# Patient Record
Sex: FEMALE | Race: WHITE | NOT HISPANIC OR LATINO | Employment: OTHER | URBAN - METROPOLITAN AREA
[De-identification: names, ages, dates, MRNs, and addresses within clinical notes are randomized per-mention and may not be internally consistent; named-entity substitution may affect disease eponyms.]

---

## 2017-01-09 ENCOUNTER — HOSPITAL ENCOUNTER (EMERGENCY)
Facility: CLINIC | Age: 62
Discharge: HOME | End: 2017-01-09
Attending: FAMILY MEDICINE | Admitting: FAMILY MEDICINE
Payer: COMMERCIAL

## 2017-01-09 VITALS
OXYGEN SATURATION: 98 % | DIASTOLIC BLOOD PRESSURE: 83 MMHG | SYSTOLIC BLOOD PRESSURE: 184 MMHG | RESPIRATION RATE: 18 BRPM | TEMPERATURE: 97.9 F

## 2017-01-09 DIAGNOSIS — S61.411A HAND LACERATION, RIGHT, INITIAL ENCOUNTER: ICD-10-CM

## 2017-01-09 PROCEDURE — 99213 OFFICE O/P EST LOW 20 MIN: CPT | Mod: 25 | Performed by: FAMILY MEDICINE

## 2017-01-09 PROCEDURE — 99213 OFFICE O/P EST LOW 20 MIN: CPT

## 2017-01-09 PROCEDURE — 12002 RPR S/N/AX/GEN/TRNK2.6-7.5CM: CPT

## 2017-01-09 PROCEDURE — 12002 RPR S/N/AX/GEN/TRNK2.6-7.5CM: CPT | Performed by: FAMILY MEDICINE

## 2017-01-09 ASSESSMENT — ENCOUNTER SYMPTOMS: CONSTITUTIONAL NEGATIVE: 1

## 2017-01-09 NOTE — ED AVS SNAPSHOT
[facility] Emergency Department    [address] [address]    Phone:  [phone]    Fax:  [phone]                                       Elizabeth Treviño   MRN: [id number]    Department:  [facility] Emergency Department   Date of Visit:  1/9/2017           After Visit Summary Signature Page     I have received my discharge instructions, and my questions have been answered. I have discussed any challenges I see with this plan with the nurse or doctor.    ..........................................................................................................................................  Patient/Patient Representative Signature      ..........................................................................................................................................  Patient Representative Print Name and Relationship to Patient    ..................................................               ................................................  Date                                            Time    ..........................................................................................................................................  Reviewed by Signature/Title    ...................................................              ..............................................  Date                                                            Time

## 2017-01-09 NOTE — ED AVS SNAPSHOT
[facility] Emergency Department    [address] [address]    Phone:  [phone]    Fax:  [phone]                                       Elizabeth Treviño   MRN: [id number]    Department:  [facility] Emergency Department   Date of Visit:  1/9/2017           Patient Information     Date Of Birth          1955        Your diagnoses for this visit were:     Hand laceration, right, initial encounter 4th webspace laceration; 5 cm       You were seen by Osiel Allison MD.      Follow-up Information     Follow up with Mariajose Dinh MD. Schedule an appointment as soon as possible for a visit in 2 weeks.    Specialty:  Internal Medicine    Why:  For suture removal    Contact information:     PHYSICIANS  [address] [place] [address] 741  [facility] [address] [phone]          Discharge Instructions         Extremity Laceration: Sutures, Staples, or Tape  A laceration is a cut through the skin. If it is deep, it may require stitches (sutures) or staples to close so it can heal. Minor cuts may be treated with surgical tape closures.   X-rays may be done if something may have entered the skin through the cut. You may also need a tetanus shot if you are not up to date on this vaccination.  Home care    Follow the health care provider s instructions on how to care for the cut.    Wash your hands with soap and warm water before and after caring for your wound. This is to help prevent infection.    Keep the wound clean and dry. If a bandage was applied and it becomes wet or dirty, replace it. Otherwise, leave it in place for the first 24 hours, then change it once a day or as directed.    If sutures or staples were used, clean the wound daily:    After removing the bandage, wash the area with soap and water. Use a wet cotton swab to loosen and remove any blood or crust that forms.    After cleaning, keep the wound clean and dry. Talk with your doctor before applying any antibiotic  ointment to the wound. Reapply the bandage.    You may remove the bandage to shower as usual after the first 24 hours, but do not soak the area in water (no swimming) until the stitches or staples are removed.    If surgical tape closures were used, keep the area clean and dry. If it becomes wet, blot it dry with a towel.    The doctor may prescribe an antibiotic cream or ointment to prevent infection. Do not stop taking this medication until you have finished the prescribed course or the doctor tells you to stop. The doctor may also prescribe medications for pain. Follow the doctor s instructions for taking these medications.    Avoid activities that may reopen your wound.  Follow-up care  Follow up with your health care provider. Most skin wounds heal within ten days. However, an infection may sometimes occur despite proper treatment. Therefore, check the wound daily for the signs of infection listed below. Stitches and staples should be removed within 7-14 days. If surgical tape closures were used, you may remove them after 10 days if they have not fallen off by then.   When to seek medical advice  Call your health care provider right away if any of these occur:    Wound bleeding not controlled by direct pressure    Signs of infection, including increasing pain in the wound, increasing wound redness or swelling, or pus or bad odor coming from the wound    Fever of 100.4 F (38 C) or higher or as directed by your healthcare provider    Stitches or staples come apart or fall out or surgical tape falls off before 7 days    Wound edges re-open    Wound changes colors    Numbness around the wound     Decreased movement around the injured area    7138-3844 [employer]. [address] [place] [address], [place] [address]. All rights reserved. This information is not intended as a substitute for professional medical care. Always follow your healthcare professional's instructions.      Keep area clean and dry and  protected with dressing the 1st 2-3 days.  You may remove the dressing after the 1st 2-3 days and leave it exposed.  Use a protective hand covering if you're working with liquids or chemicals.  Return for suture removal with her primary care provider or the urgent care in 10-14 days.      24 Hour Appointment Hotline       To make an appointment at any [facility] clinic, call [phone] ([phone]). If you don't have a family doctor or clinic, we will help you find one. [facility] clinics are conveniently located to serve the needs of you and your family.             Review of your medicines      Our records show that you are taking the medicines listed below. If these are incorrect, please call your family doctor or clinic.        Dose / Directions Last dose taken    atorvastatin 10 MG tablet   Commonly known as:  LIPITOR   Dose:  10 mg   Quantity:  90 tablet        Take 1 tablet (10 mg) by mouth daily   Refills:  3        B COMPLEX 1 PO        Take  by mouth.   Refills:  0        CALCIUM + D PO   Dose:  2 tablet        Take 2 tablets by mouth. 500 mg calcium   Refills:  0        FISH OIL   Dose:  2 capsule        2 capsules. Unsure of dose, maybe 500 mg each   Refills:  0        levothyroxine 88 MCG tablet   Commonly known as:  SYNTHROID/LEVOTHROID   Dose:  88 mcg   Quantity:  90 tablet        Take 1 tablet (88 mcg) by mouth daily   Refills:  3        rosuvastatin 5 MG tablet   Commonly known as:  CRESTOR   Dose:  5 mg   Quantity:  90 tablet        Take 1 tablet (5 mg) by mouth daily   Refills:  1        VALTREX 500 MG tablet   Dose:  500 mg   Quantity:  30 tablet   Generic drug:  valACYclovir        Take 1 tablet (500 mg) by mouth 2 times daily   Refills:  3                Orders Needing Specimen Collection     None      Pending Results     No orders found from 1/8/2017 to 1/10/2017.            Pending Culture Results     No orders found from 1/8/2017 to 1/10/2017.             Test Results from your hospital  stay            Thank you for choosing [facility]       Thank you for choosing [facility] for your care. Our goal is always to provide you with excellent care. Hearing back from our patients is one way we can continue to improve our services. Please take a few minutes to complete the written survey that you may receive in the mail after you visit with us. Thank you!        [employer] Information     [employer] gives you secure access to your electronic health record. If you see a primary care provider, you can also send messages to your care team and make appointments. If you have questions, please call your primary care clinic.  If you do not have a primary care provider, please call [phone] and they will assist you.        Care EveryWhere ID     This is your Care EveryWhere ID. This could be used by other organizations to access your [facility] medical records  [id number]        After Visit Summary       This is your record. Keep this with you and show to your community pharmacist(s) and doctor(s) at your next visit.

## 2017-01-10 NOTE — DISCHARGE INSTRUCTIONS
Extremity Laceration: Sutures, Staples, or Tape  A laceration is a cut through the skin. If it is deep, it may require stitches (sutures) or staples to close so it can heal. Minor cuts may be treated with surgical tape closures.   X-rays may be done if something may have entered the skin through the cut. You may also need a tetanus shot if you are not up to date on this vaccination.  Home care    Follow the health care provider s instructions on how to care for the cut.    Wash your hands with soap and warm water before and after caring for your wound. This is to help prevent infection.    Keep the wound clean and dry. If a bandage was applied and it becomes wet or dirty, replace it. Otherwise, leave it in place for the first 24 hours, then change it once a day or as directed.    If sutures or staples were used, clean the wound daily:    After removing the bandage, wash the area with soap and water. Use a wet cotton swab to loosen and remove any blood or crust that forms.    After cleaning, keep the wound clean and dry. Talk with your doctor before applying any antibiotic ointment to the wound. Reapply the bandage.    You may remove the bandage to shower as usual after the first 24 hours, but do not soak the area in water (no swimming) until the stitches or staples are removed.    If surgical tape closures were used, keep the area clean and dry. If it becomes wet, blot it dry with a towel.    The doctor may prescribe an antibiotic cream or ointment to prevent infection. Do not stop taking this medication until you have finished the prescribed course or the doctor tells you to stop. The doctor may also prescribe medications for pain. Follow the doctor s instructions for taking these medications.    Avoid activities that may reopen your wound.  Follow-up care  Follow up with your health care provider. Most skin wounds heal within ten days. However, an infection may sometimes occur despite proper treatment.  Therefore, check the wound daily for the signs of infection listed below. Stitches and staples should be removed within 7-14 days. If surgical tape closures were used, you may remove them after 10 days if they have not fallen off by then.   When to seek medical advice  Call your health care provider right away if any of these occur:    Wound bleeding not controlled by direct pressure    Signs of infection, including increasing pain in the wound, increasing wound redness or swelling, or pus or bad odor coming from the wound    Fever of 100.4 F (38 C) or higher or as directed by your healthcare provider    Stitches or staples come apart or fall out or surgical tape falls off before 7 days    Wound edges re-open    Wound changes colors    Numbness around the wound     Decreased movement around the injured area    1927-2416 [employer]. [address] [place], [place] [address]. All rights reserved. This information is not intended as a substitute for professional medical care. Always follow your healthcare professional's instructions.      Keep area clean and dry and protected with dressing the 1st 2-3 days.  You may remove the dressing after the 1st 2-3 days and leave it exposed.  Use a protective hand covering if you're working with liquids or chemicals.  Return for suture removal with her primary care provider or the urgent care in 10-14 days.

## 2017-06-03 ENCOUNTER — HEALTH MAINTENANCE LETTER (OUTPATIENT)
Age: 62
End: 2017-06-03

## 2017-08-04 ENCOUNTER — OFFICE VISIT (OUTPATIENT)
Dept: INTERNAL MEDICINE | Facility: CLINIC | Age: 62
End: 2017-08-04

## 2017-08-04 VITALS
RESPIRATION RATE: 20 BRPM | BODY MASS INDEX: 36.44 KG/M2 | OXYGEN SATURATION: 96 % | SYSTOLIC BLOOD PRESSURE: 122 MMHG | HEART RATE: 67 BPM | WEIGHT: 243.2 LBS | DIASTOLIC BLOOD PRESSURE: 81 MMHG

## 2017-08-04 DIAGNOSIS — E03.9 ACQUIRED HYPOTHYROIDISM: ICD-10-CM

## 2017-08-04 DIAGNOSIS — R60.9 EDEMA, UNSPECIFIED TYPE: Primary | ICD-10-CM

## 2017-08-04 DIAGNOSIS — Z12.31 VISIT FOR SCREENING MAMMOGRAM: ICD-10-CM

## 2017-08-04 DIAGNOSIS — R60.9 EDEMA, UNSPECIFIED TYPE: ICD-10-CM

## 2017-08-04 DIAGNOSIS — E78.2 MIXED HYPERLIPIDEMIA: ICD-10-CM

## 2017-08-04 DIAGNOSIS — Z12.11 SCREEN FOR COLON CANCER: ICD-10-CM

## 2017-08-04 LAB
ALBUMIN UR-MCNC: NEGATIVE MG/DL
ANION GAP SERPL CALCULATED.3IONS-SCNC: 5 MMOL/L (ref 3–14)
APPEARANCE UR: ABNORMAL
BACTERIA #/AREA URNS HPF: ABNORMAL /HPF
BILIRUB UR QL STRIP: NEGATIVE
BUN SERPL-MCNC: 14 MG/DL (ref 7–30)
CALCIUM SERPL-MCNC: 9 MG/DL (ref 8.5–10.1)
CHLORIDE SERPL-SCNC: 106 MMOL/L (ref 94–109)
CHOLEST SERPL-MCNC: 162 MG/DL
CO2 SERPL-SCNC: 30 MMOL/L (ref 20–32)
COLOR UR AUTO: YELLOW
CREAT SERPL-MCNC: 1 MG/DL (ref 0.52–1.04)
GFR SERPL CREATININE-BSD FRML MDRD: 56 ML/MIN/1.7M2
GLUCOSE SERPL-MCNC: 82 MG/DL (ref 70–99)
GLUCOSE UR STRIP-MCNC: NEGATIVE MG/DL
HDLC SERPL-MCNC: 67 MG/DL
HGB UR QL STRIP: NEGATIVE
KETONES UR STRIP-MCNC: NEGATIVE MG/DL
LDLC SERPL CALC-MCNC: 77 MG/DL
LEUKOCYTE ESTERASE UR QL STRIP: ABNORMAL
MUCOUS THREADS #/AREA URNS LPF: PRESENT /LPF
NITRATE UR QL: NEGATIVE
NONHDLC SERPL-MCNC: 96 MG/DL
PH UR STRIP: 7 PH (ref 5–7)
POTASSIUM SERPL-SCNC: 4.4 MMOL/L (ref 3.4–5.3)
RBC #/AREA URNS AUTO: 1 /HPF (ref 0–2)
SODIUM SERPL-SCNC: 141 MMOL/L (ref 133–144)
SP GR UR STRIP: 1.02 (ref 1–1.03)
SQUAMOUS #/AREA URNS AUTO: 1 /HPF (ref 0–1)
TRIGL SERPL-MCNC: 93 MG/DL
TSH SERPL DL<=0.005 MIU/L-ACNC: 3.36 MU/L (ref 0.4–4)
URN SPEC COLLECT METH UR: ABNORMAL
UROBILINOGEN UR STRIP-MCNC: 0 MG/DL (ref 0–2)
WBC #/AREA URNS AUTO: 1 /HPF (ref 0–2)

## 2017-08-04 RX ORDER — ROSUVASTATIN CALCIUM 5 MG/1
5 TABLET, COATED ORAL DAILY
Qty: 90 TABLET | Refills: 1 | Status: SHIPPED | OUTPATIENT
Start: 2017-08-04 | End: 2018-05-01

## 2017-08-04 RX ORDER — LEVOTHYROXINE SODIUM 88 UG/1
88 TABLET ORAL DAILY
Qty: 90 TABLET | Refills: 3 | Status: SHIPPED | OUTPATIENT
Start: 2017-08-04 | End: 2018-05-01

## 2017-08-04 ASSESSMENT — PAIN SCALES - GENERAL: PAINLEVEL: NO PAIN (0)

## 2017-08-04 NOTE — MR AVS SNAPSHOT
After Visit Summary   8/4/2017    Elizabeth Treviño    MRN: [id number]           Patient Information     Date Of Birth          1955        Visit Information        Provider Department      8/4/2017 7:30 AM Mariajose Dinh MD [facility]        Today's Diagnoses     Edema, unspecified type    -  1    Screen for colon cancer        Visit for screening mammogram        Mixed hyperlipidemia        Acquired hypothyroidism           Follow-ups after your visit        Your next 10 appointments already scheduled     Aug 04, 2017  8:30 AM CDT   LAB with  LAB   [facility] Lab ([facility])    [address] [place] [address] [id number] [phone]           Patient must bring picture ID. Patient should be prepared to give a urine specimen  Please do not eat 10-12 hours before your appointment if you are coming in fasting for labs on lipids, cholesterol, or glucose (sugar). Pregnant women should follow their Care Team instructions. Water with medications is okay. Do not drink coffee or other fluids. If you have concerns about taking  your medications, please ask at office or if scheduling via [employer]), send a message by clicking on Secure Messaging, Message Your Care Team.            Sep 29, 2017  7:45 AM CDT   (Arrive by 7:30 AM)   MA SCREENING DIGITAL BILATERAL with [id number]   [facility] ([facility])    [address] [place] [address] [id number] [phone]           Do not use any powder, lotion or deodorant under your arms or on your breast. If you do, we will ask you to remove it before your exam.  Wear comfortable, two-piece clothing.  If you have any allergies, tell your care team.  Bring any previous mammograms from other facilities or have them mailed to the breast center. Three-dimensional (3D) mammograms are available at [place] locations in [facility], [place], [place],  "[facility], and [place]. [facility] locations include [place] and [facility] in [place]. Benefits of 3D mammograms include: - Improved rate of cancer detection - Decreases your chance of having to go back for more tests, which means fewer: - \"False-positive\" results (This means that there is an abnormal area but it isn't cancer.) - Invasive testing procedures, such as a biopsy or surgery - Can provide clearer images of the breast if you have dense breast tissue. 3D mammography is an optional exam that anyone can have with a 2D mammogram. It doesn't replace or take the place of a 2D mammogram. 2D mammograms remain an effective screening test for all women.  Not all insurance companies cover the cost of a 3D mammogram. Check with your insurance.              Future tests that were ordered for you today     Open Future Orders        Priority Expected Expires Ordered    MA SCREENING DIGITAL BILAT - Future  (s+30) Routine  8/4/2018 8/4/2017    Fecal colorectal cancer screen FIT - Future (S+30) Routine 8/25/2017 9/3/2017 8/4/2017    TSH with free T4 reflex Routine 8/4/2017 8/18/2017 8/4/2017    UA with Micro reflex to Culture Routine 8/4/2017 8/4/2018 8/4/2017            Who to contact     Please call your clinic at [phone] to:    Ask questions about your health    Make or cancel appointments    Discuss your medicines    Learn about your test results    Speak to your doctor   If you have compliments or concerns about an experience at your clinic, or if you wish to file a complaint, please contact [facility] Patient Relations at [phone] or email us at [email or web address]         Additional Information About Your Visit        [employer] Information     [employer] gives you secure access to your electronic health record. If you see a primary care provider, you can also send messages to your care team and make appointments. If you have questions, please " call your primary care clinic.  If you do not have a primary care provider, please call [phone] and they will assist you.      [employer] is an electronic gateway that provides easy, online access to your medical records. With [employer], you can request a clinic appointment, read your test results, renew a prescription or communicate with your care team.     To access your existing account, please contact your [facility] or call [phone] for assistance.        Care EveryWhere ID     This is your Care EveryWhere ID. This could be used by other organizations to access your [facility] medical records  [id number]        Your Vitals Were     Pulse Respirations Pulse Oximetry BMI (Body Mass Index)          67 20 96% 36.44 kg/m2         Blood Pressure from Last 3 Encounters:   08/04/17 122/81   01/09/17 184/83   08/22/16 111/68    Weight from Last 3 Encounters:   08/04/17 110.3 kg (243 lb 3.2 oz)   08/22/16 108.4 kg (239 lb)   06/10/16 108.8 kg (239 lb 12.8 oz)              We Performed the Following     Basic metabolic panel     Lipid Profile          Today's Medication Changes          These changes are accurate as of: 8/4/17  8:29 AM.  If you have any questions, ask your nurse or doctor.               These medicines have changed or have updated prescriptions.        Dose/Directions    VALTREX 500 MG tablet   This may have changed:    - when to take this  - reasons to take this   Used for:  Recurrent cold sores   Generic drug:  valACYclovir        Dose:  500 mg   Take 1 tablet (500 mg) by mouth 2 times daily   Quantity:  30 tablet   Refills:  3            Where to get your medicines      These medications were sent to [facility] [id number] [place], [place] [address] [facility] AT [facility] OF [address] [place] [address]  [address] [facility] [address]     Phone:  [phone]     levothyroxine 88 MCG tablet    rosuvastatin 5 MG tablet                Primary Care Provider Office  Phone # Fax #    Mariajose Dinh [phone]        PHYSICIANS [address] [facility] 741  [facility] [address]        Equal Access to Services     GENANIKKI VIKI : Saurav quin ibrahim corie Mcdonald, waaxda luqadaha, qaybta kaalmada adedeisy, sailaja encinas laJasondoris abarca. So [facility] [phone].    ATENCIÓN: Si habla español, tiene a tomas disposición servicios gratuitos de asistencia lingüística. Llame al [phone].    We comply with applicable federal civil rights laws and [place] laws. We do not discriminate on the basis of race, color, national origin, age, disability sex, sexual orientation or gender identity.            Thank you!     Thank you for choosing [facility]  for your care. Our goal is always to provide you with excellent care. Hearing back from our patients is one way we can continue to improve our services. Please take a few minutes to complete the written survey that you may receive in the mail after your visit with us. Thank you!             Your Updated Medication List - Protect others around you: Learn how to safely use, store and throw away your medicines at www.disposemymeds.org.          This list is accurate as of: 8/4/17  8:29 AM.  Always use your most recent med list.                   Brand Name Dispense Instructions for use Diagnosis    B COMPLEX 1 PO      Take  by mouth.        CALCIUM + D PO      Take 2 tablets by mouth. 500 mg calcium        FISH OIL      2 capsules. Unsure of dose, maybe 500 mg each        levothyroxine 88 MCG tablet    SYNTHROID/LEVOTHROID    90 tablet    Take 1 tablet (88 mcg) by mouth daily    Acquired hypothyroidism       rosuvastatin 5 MG tablet    CRESTOR    90 tablet    Take 1 tablet (5 mg) by mouth daily    Mixed hyperlipidemia       VALTREX 500 MG tablet   Generic drug:  valACYclovir     30 tablet    Take 1 tablet (500 mg) by mouth 2 times daily    Recurrent cold sores

## 2017-08-04 NOTE — NURSING NOTE
"Chief Complaint   Patient presents with     Edema     edema in both feet,especially right foot-\" started about a week ago after a trip to [facility]\"     Numbness      2nd and 3rd toes, numbness and tingling (on and off) for about \" 3 weeks\"     Refill Request     needs crestor/synthroid renewed     Headache     headache on right side of head \" this summer, on/off\"     Pain     right lower abdominal tenderness   Joanne Thomas LPN 7:34 AM on 8/4/2017  "

## 2017-08-04 NOTE — PROGRESS NOTES
HPI:   Elizabeth Treviño is a 62 year old year old female presents today for followup. A week ago noticed some swelling in her ankles, now better. Had flown on a long trip recently and had been sitting for a long time. Notes that she can see sock marks on her ankles at end of day. Has continued to gain weight. Very frustrated. No chest pain or SOB, no MANSFIELD, PND. Notes some tingling like pins in her 2nd and 3rd toe righ tside at tips sometimes; worse with some shoes, better with others.       ASSESSMENT/PLAN:   1. Periperal edema, likely related to venous insufficiency. Will check BMP, u/a, TSH to r/o renal causes, hypothyroidism, proteinuria. Nothing to suggest right sided heart failure. Discussed weight loss, physical activity, compression stockings if worsened.   2. Hyperlipidemia on rosuvastatin - lipids today  3. Hypothyroidism - TSH today.  4. HCM - mammogram, FIT testing at her request (colonoscopy due in 2018)      Patient Active Problem List   Diagnosis     Acquired hypothyroidism     Easy bruising     Osteoporosis     PSVT (paroxysmal supraventricular tachycardia) (H)     Abnormal weight gain     Pigmented skin lesion     SK (seborrheic keratosis)     Seborrheic dermatitis       Current Outpatient Prescriptions   Medication Sig Dispense Refill     rosuvastatin (CRESTOR) 5 MG tablet Take 1 tablet (5 mg) by mouth daily 90 tablet 1     levothyroxine (SYNTHROID, LEVOTHROID) 88 MCG tablet Take 1 tablet (88 mcg) by mouth daily 90 tablet 3     VALTREX 500 MG tablet Take 1 tablet (500 mg) by mouth 2 times daily (Patient taking differently: Take 500 mg by mouth as needed ) 30 tablet 3     Calcium Carbonate-Vitamin D (CALCIUM + D PO) Take 2 tablets by mouth. 500 mg calcium         FISH OIL 2 capsules. Unsure of dose, maybe 500 mg each       B Complex Vitamins (B COMPLEX 1 PO) Take  by mouth.             ROS:    Constitutional: no fevers, chill   Cardiovascular: no chest pain, palpitations  Respiratory: no dyspnea, cough,  shortness of breath or wheezing   GI: no nausea, vomiting, diarrhea, no abdominal pain   Musculoskeletal: see HPI. No joint pain      PHYSICAL EXAM:   /81 (BP Location: Right arm, Patient Position: Chair, Cuff Size: Adult Large)  Pulse 67  Resp 20  Wt 110.3 kg (243 lb 3.2 oz)  SpO2 96%  BMI 36.44 kg/m2   Wt Readings from Last 1 Encounters:   08/04/17 110.3 kg (243 lb 3.2 oz)       Constitutional: no distress, comfortable, pleasant    Breasts: symmetric breasts, no nipple discharge, no masses palpable.  Cardiovascular: regular rate and rhythm, normal S1 and S2, no murmurs, rubs or gallops  Respiratory: clear to auscultation, no wheezes or crackles, normal breath sounds   Musculoskeletal:  Trace edema today at ankles      Mariajose Dinh MD

## 2017-09-01 ENCOUNTER — MYC MEDICAL ADVICE (OUTPATIENT)
Dept: INTERNAL MEDICINE | Facility: CLINIC | Age: 62
End: 2017-09-01

## 2017-11-28 ASSESSMENT — ENCOUNTER SYMPTOMS
SORE THROAT: 0
SINUS PAIN: 0
NECK MASS: 0
TROUBLE SWALLOWING: 0
SMELL DISTURBANCE: 0
HOARSE VOICE: 1
TASTE DISTURBANCE: 0
SINUS CONGESTION: 1

## 2017-11-29 ENCOUNTER — OFFICE VISIT (OUTPATIENT)
Dept: FAMILY MEDICINE | Facility: CLINIC | Age: 62
End: 2017-11-29

## 2017-11-29 VITALS
BODY MASS INDEX: 36.14 KG/M2 | OXYGEN SATURATION: 98 % | WEIGHT: 241.2 LBS | HEART RATE: 76 BPM | RESPIRATION RATE: 18 BRPM | DIASTOLIC BLOOD PRESSURE: 78 MMHG | SYSTOLIC BLOOD PRESSURE: 118 MMHG

## 2017-11-29 DIAGNOSIS — E66.812 CLASS 2 OBESITY DUE TO EXCESS CALORIES WITHOUT SERIOUS COMORBIDITY WITH BODY MASS INDEX (BMI) OF 36.0 TO 36.9 IN ADULT: ICD-10-CM

## 2017-11-29 DIAGNOSIS — E03.9 ACQUIRED HYPOTHYROIDISM: ICD-10-CM

## 2017-11-29 DIAGNOSIS — Z23 NEED FOR PROPHYLACTIC VACCINATION AND INOCULATION AGAINST INFLUENZA: ICD-10-CM

## 2017-11-29 DIAGNOSIS — M85.89 OSTEOPENIA OF MULTIPLE SITES: ICD-10-CM

## 2017-11-29 DIAGNOSIS — Z76.89 ENCOUNTER TO ESTABLISH CARE WITH NEW DOCTOR: Primary | ICD-10-CM

## 2017-11-29 DIAGNOSIS — E66.09 CLASS 2 OBESITY DUE TO EXCESS CALORIES WITHOUT SERIOUS COMORBIDITY WITH BODY MASS INDEX (BMI) OF 36.0 TO 36.9 IN ADULT: ICD-10-CM

## 2017-11-29 NOTE — MR AVS SNAPSHOT
After Visit Summary   11/29/2017    Elizabeth Treviño    MRN: [id number]           Patient Information     Date Of Birth          1955        Visit Information        Provider Department      11/29/2017 1:00 PM Coleen Smith MD PhD [facility]        Today's Diagnoses     Encounter to establish care with new doctor    -  1    Need for prophylactic vaccination and inoculation against influenza        Osteopenia of multiple sites        Acquired hypothyroidism        Class 2 obesity due to excess calories without serious comorbidity with body mass index (BMI) of 36.0 to 36.9 in adult          Care Instructions    [facility]: [phone]     [facility] Medication Refill Request Information:  * Please contact your pharmacy regarding ANY request for medication refills.  ** [facility] Prescription Fax = [phone]  * Please allow 3 business days for routine medication refills.  * Please allow 5 business days for controlled substance medication refills.     [facility] Test Result notification information:  *You will be notified with in 7-10 days of your appointment day regarding the results of your test.  If you are on MyChart you will be notified as soon as the provider has reviewed the results and signed off on them.    DEXA Screening Tool    Dexa Scan  Is the patient taking any calcium supplements? yes, if yes patient must stop calcium supplements 24 hours prior to appointment.        Get a DEXA and then see me and bring your calcium and vit D pills  Patient should take 1200mg of calcium/day in divided doses  Diet and all supplements together = 1200-1500mg/day and vitamin D3 1000IU/day.  1 dairy serving = 300mg  You only absorb 5-600mg of calcium at one time  Dietary calcium is the best  Cut meal portions down - no snacks - cut out the white in your diet - pasta bread, potatoes   Exercise - walking - goal would be 60 min daily   Hold on shingles shot              Follow-ups after your visit        Follow-up notes from your care team     Return in about 3 months (around 2/28/2018).      Who to contact     Please call your clinic at [phone] to:    Ask questions about your health    Make or cancel appointments    Discuss your medicines    Learn about your test results    Speak to your doctor   If you have compliments or concerns about an experience at your clinic, or if you wish to file a complaint, please contact [facility] Patient Relations at [phone] or email us at [email or web address]         Additional Information About Your Visit        [employer] Information     [employer] gives you secure access to your electronic health record. If you see a primary care provider, you can also send messages to your care team and make appointments. If you have questions, please call your primary care clinic.  If you do not have a primary care provider, please call [phone] and they will assist you.      [employer] is an electronic gateway that provides easy, online access to your medical records. With [employer], you can request a clinic appointment, read your test results, renew a prescription or communicate with your care team.     To access your existing account, please contact your [facility] or call [phone] for assistance.        Care EveryWhere ID     This is your Care EveryWhere ID. This could be used by other organizations to access your [place] medical records  [id number]        Your Vitals Were     Pulse Respirations Pulse Oximetry Breastfeeding? BMI (Body Mass Index)       76 18 98% No 36.14 kg/m2        Blood Pressure from Last 3 Encounters:   11/29/17 118/78   08/04/17 122/81   01/09/17 184/83    Weight from Last 3 Encounters:   11/29/17 109.4 kg (241 lb 3.2 oz)   08/04/17 110.3 kg (243 lb 3.2 oz)   08/22/16 108.4 kg (239 lb)              We Performed the Following     FLU VACCINE, 3 YRS +, IM   [72877]          Today's Medication Changes          These changes are accurate as of: 11/29/17 11:59 PM.  If you have any questions, ask your nurse or doctor.               These medicines have changed or have updated prescriptions.        Dose/Directions    VALTREX 500 MG tablet   This may have changed:    - when to take this  - reasons to take this   Used for:  Recurrent cold sores   Generic drug:  valACYclovir        Dose:  500 mg   Take 1 tablet (500 mg) by mouth 2 times daily   Quantity:  30 tablet   Refills:  3                Primary Care Provider    Mariajose Dinh MD       No address on file        Equal Access to Services     [facility]: Hadii quin Mcdonald, wapb gaspar, zane rodrigues, sailaja ramos . So [facility] [phone].    ATENCIÓN: Si habla español, tiene a tomas disposición servicios gratuitos de asistencia lingüística. [facility] [phone].    We comply with applicable federal civil rights laws and [place] laws. We do not discriminate on the basis of race, color, national origin, age, disability, sex, sexual orientation, or gender identity.            Thank you!     Thank you for choosing [facility]  for your care. Our goal is always to provide you with excellent care. Hearing back from our patients is one way we can continue to improve our services. Please take a few minutes to complete the written survey that you may receive in the mail after your visit with us. Thank you!             Your Updated Medication List - Protect others around you: Learn how to safely use, store and throw away your medicines at www.disposemymeds.org.          This list is accurate as of: 11/29/17 11:59 PM.  Always use your most recent med list.                   Brand Name Dispense Instructions for use Diagnosis    B COMPLEX 1 PO      Take  by mouth.        CALCIUM + D PO      Take 2 tablets by mouth. 500 mg calcium        levothyroxine 88 MCG  tablet    SYNTHROID/LEVOTHROID    90 tablet    Take 1 tablet (88 mcg) by mouth daily    Acquired hypothyroidism       rosuvastatin 5 MG tablet    CRESTOR    90 tablet    Take 1 tablet (5 mg) by mouth daily    Mixed hyperlipidemia       VALTREX 500 MG tablet   Generic drug:  valACYclovir     30 tablet    Take 1 tablet (500 mg) by mouth 2 times daily    Recurrent cold sores       VITAMIN D-3 PO      Take 1,000 Int'l Units by mouth daily

## 2017-11-29 NOTE — PROGRESS NOTES
Primary Care Center  Established Patient visit    Name: Elizabeth Treviño MRN: [id number]     Age: 62 year old           Chief Complaint:    YOB: 1955       CC:establish care with new MD          HPI and ROS:   HPI:    62 year old female with a PMH of osteoporosis and hypothyroidism who is PM and  Gr0P0. No vaginal bleeding or discharge.     Last DXA was  9/2014 .    She is taking calcium pills - 600mg - 2 tablets  - at night    No multivit;  takes separate  Vit D  - diet: cottage cheese, milk 2 glasses/d, cheese some   Mammogram: Oct 2017 and normal    Has FIT kit at home - had colonoscopy 2015     Has problems with URI's prolonged during the year.     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      ROS:  Answers for HPI/ROS submitted by the patient on 11/28/2017   General Symptoms: No  Skin Symptoms: No  HENT Symptoms: Yes  EYE SYMPTOMS: No  HEART SYMPTOMS: No  LUNG SYMPTOMS: No  INTESTINAL SYMPTOMS: No  URINARY SYMPTOMS: No  GYNECOLOGIC SYMPTOMS: No  BREAST SYMPTOMS: No  SKELETAL SYMPTOMS: No  BLOOD SYMPTOMS: No  NERVOUS SYSTEM SYMPTOMS: No  MENTAL HEALTH SYMPTOMS: No  Ear pain: No  Ear discharge: No  Hearing loss: Yes  Tinnitus: Yes  Nosebleeds: No  Congestion: Yes  Sinus pain: No  Trouble swallowing: No   Voice hoarseness: Yes  Mouth sores: No  Sore throat: No  Tooth pain: No  Gum tenderness: No  Bleeding gums: No  Change in taste: No  Change in sense of smell: No  Dry mouth: No  Hearing aid used: No  Neck lump: No             Past Medical History:     Past Medical History:   Diagnosis Date     Abnormal weight gain 9/26/2012     Acquired hypothyroidism 9/26/2012     Problem list name updated by automated process. Provider to review     Early menopause     ovarian failure at age 36      Endometriosis      Osteoporosis, unspecified 9/26/2012     PSVT (paroxysmal supraventricular tachycardia) (H) 9/26/2012             Past Surgical History:      Past Surgical  History:   Procedure Laterality Date     BREAST SURGERY Left 1981    benign cyst      COLONOSCOPY  2015    repeat in 10 yrs      ovary removed Left 1986    partial removal of right ovary 1990      Family History   Problem Relation Age of Onset     Hypertension Mother      CEREBROVASCULAR DISEASE Mother      Macular Degeneration Mother      Hypertension Sister      CANCER Father      Lung cancer     Respiratory Father      COPD     CANCER Brother      Lung cancer     Thyroid Disease Sister      Thyroid Disease Sister      Amblyopia No family hx of      Retinal detachment No family hx of      Glaucoma No family hx of      Social History     Social History     Marital status:      Spouse name: N/A     Number of children: N/A     Years of education: N/A     Occupational History     executive [employer]     Social History Main Topics     Smoking status: Never Smoker     Smokeless tobacco: Never Used     Alcohol use No     Drug use: No     Sexual activity: Not Currently     Partners: Male     Other Topics Concern     Not on file     Social History Narrative    , adopted son,  is retired - working full time               Immunizations:     Immunization History   Administered Date(s) Administered     Flu > 3 Years 11/29/2017     HEPA 11/05/2010     Influenza (IIV3) PF 11/09/2007, 10/29/2011, 09/26/2012, 10/30/2013, 12/19/2014     TD (ADULT, 7+) 11/05/2010     TDAP Vaccine (Boostrix) 08/05/2015             Allergies:   No Known Allergies          Medications:     Current Outpatient Prescriptions on File Prior to Visit:  rosuvastatin (CRESTOR) 5 MG tablet Take 1 tablet (5 mg) by mouth daily   levothyroxine (SYNTHROID/LEVOTHROID) 88 MCG tablet Take 1 tablet (88 mcg) by mouth daily   VALTREX 500 MG tablet Take 1 tablet (500 mg) by mouth 2 times daily (Patient taking differently: Take 500 mg by mouth as needed )   Calcium Carbonate-Vitamin D (CALCIUM + D PO) Take 2 tablets by mouth. 500 mg calcium   B Complex  Vitamins (B COMPLEX 1 PO) Take  by mouth.     No current facility-administered medications on file prior to visit.          Exam:   /78  Pulse 76  Resp 18  Wt 109.4 kg (241 lb 3.2 oz)  SpO2 98%  Breastfeeding? No  BMI 36.14 kg/m2    General: NAD, alert and conversational  HEENT: no palpable thyroid, no nodes,   CV: Normal S1,S2 with RRR no murmurs, rubs or gallops  Resp: Lungs CTA bilaterally with no wheezes or crackles  Abd: Soft, NTND with no organomegaly or masses    Pelvic deferred    Skin: No concerning lesions or rashes        Labs:   A1C  No results found for: A1C    Lipid panel  Cholesterol   Date Value Ref Range Status   08/04/2017 162 <200 mg/dL Final   11/24/2015 177 <200 mg/dL Final     HDL Cholesterol   Date Value Ref Range Status   08/04/2017 67 >49 mg/dL Final   11/24/2015 65 >49 mg/dL Final     LDL Cholesterol Calculated   Date Value Ref Range Status   08/04/2017 77 <100 mg/dL Final     Comment:     Desirable:       <100 mg/dl   11/24/2015 90 <100 mg/dL Final     Triglycerides   Date Value Ref Range Status   08/04/2017 93 <150 mg/dL Final   11/24/2015 112 <150 mg/dL Final     Cholesterol/HDL Ratio   Date Value Ref Range Status   08/05/2015 3.6 0.0 - 5.0 Final   07/23/2014 3.9 0.0 - 5.0 Final            Assessment and Plan:     ASSESSMENT: Healthy  PM female who gets annual physical at [facility] and comes in today  to establish care with a new physician and needs a flu shot. Patient has osteopenia and needs DXA update and also has obesity.     (Z76.89) Encounter to establish care with new doctor  (primary encounter diagnosis)  Comment: PM female, gets yearly done at [facility] - immuniz UTD - hold on shingles shot, other labs - per [facility]  Plan: as below     (Z23) Need for prophylactic vaccination and inoculation against influenza  Comment: given  Plan: FLU VACCINE, 3 YRS +, IM  [93141]        Given     (M85.89) Osteopenia of multiple sites  Comment: last DEXA was  2014 needs repeat, discussed calcium and vit D   Plan: Dexa hip/pelvis/spine            (E03.9) Acquired hypothyroidism  Comment: on supplement  Plan: needs yearly TSH - normal in 8/2017    (E66.09,  Z68.36) Class 2 obesity due to excess calories without serious comorbidity with body mass index (BMI) of 36.0 to 36.9 in adult  Comment: talked about practical ways for weight loss  Plan: follow    hypercholesterolemia  Comment: on crestor  Plan last lipid Aug 2017 and LDL was 77        PLAN  Get a DEXA and then see me and bring your calcium and vit D pills  Patient should take 1200mg of calcium/day in divided doses  Diet and all supplements together = 1200-1500mg/day and vitamin D3 1000IU/day.  1 dairy serving = 300mg  You only absorb 5-600mg of calcium at one time  Dietary calcium is the best  Cut meal portions down - no snacks - cut out the white in your diet - pasta bread, potatoes   Exercise - walking - goal would be 60 min daily   Hold on shingles shot   Flu shot given    RTC: 3 months

## 2017-11-29 NOTE — NURSING NOTE
Chief Complaint   Patient presents with     Establish Care     Patient is here to re-establish a new PCP.      Erica Ortega LPN at 12:46 PM on 11/29/2017.

## 2017-11-29 NOTE — PROGRESS NOTES
Rooming Note    Health Maintenance  Health Maintenance Due   Topic Date Due     ADVANCE DIRECTIVE PLANNING Q5 YRS  04/30/2010     PAP Q1 YR DIAGNOSTIC  07/23/2015     COLONOSCOPY Q10 YR  12/28/2016     INFLUENZA VACCINE (SYSTEM ASSIGNED)  09/01/2017   Health maintenance items discussed and ordered/forwarded to PCP.

## 2017-11-29 NOTE — PATIENT INSTRUCTIONS
[facility]: [phone]     [facility] Medication Refill Request Information:  * Please contact your pharmacy regarding ANY request for medication refills.  ** [facility] Pres[facility] Fax = [phone]  * Please allow 3 business days for routine medication refills.  * Please allow 5 business days for controlled substance medication refills.     [facility] Test Result notification information:  *You will be notified with in 7-10 days of your appointment day regarding the results of your test.  If you are on MyChart you will be notified as soon as the provider has reviewed the results and signed off on them.    DEXA Screening Tool    Dexa Scan  Is the patient taking any calcium supplements? yes, if yes patient must stop calcium supplements 24 hours prior to appointment.        Get a DEXA and then see me and bring your calcium and vit D pills  Patient should take 1200mg of calcium/day in divided doses  Diet and all supplements together = 1200-1500mg/day and vitamin D3 1000IU/day.  1 dairy serving = 300mg  You only absorb 5-600mg of calcium at one time  Dietary calcium is the best  Cut meal portions down - no snacks - cut out the white in your diet - pasta bread, potatoes   Exercise - walking - goal would be 60 min daily   Hold on shingles shot

## 2017-11-29 NOTE — NURSING NOTE
"Injectable Influenza Immunization Documentation    1.  Has the patient received the information for the injectable influenza vaccine? YES     2. Is the patient 6 months of age or older? YES     3. Does the patient have any of the following contraindications?         Severe allergy to eggs?  No     Severe allergic reaction to previous influenza vaccines? No   Severe allergy to latex? No       History of Guillain-Crossnore syndrome? No     Currently have a temperature greater than 100.4F? No        4.  Severely egg allergic patients should have flu vaccine eligibility assessed by an MD, RN, or pharmacist, and those who received flu vaccine should be observed for 15 min by an MD, RN, Pharmacist, Medical Technician, or member of clinic staff.\": YES    5. Latex-allergic patients should be given latex-free influenza vaccine Yes. Please reference the Vaccine latex table to determine if your clinic s product is latex-containing.       Vaccination given by Erica Ortega LPN at 1:38 PM on 11/29/2017.        "

## 2017-12-15 ENCOUNTER — HOSPITAL ENCOUNTER (EMERGENCY)
Facility: CLINIC | Age: 62
Discharge: HOME | End: 2017-12-15
Attending: EMERGENCY MEDICINE | Admitting: EMERGENCY MEDICINE
Payer: COMMERCIAL

## 2017-12-15 ENCOUNTER — APPOINTMENT (OUTPATIENT)
Dept: GENERAL RADIOLOGY | Facility: CLINIC | Age: 62
End: 2017-12-15
Attending: EMERGENCY MEDICINE
Payer: COMMERCIAL

## 2017-12-15 VITALS
DIASTOLIC BLOOD PRESSURE: 66 MMHG | SYSTOLIC BLOOD PRESSURE: 129 MMHG | OXYGEN SATURATION: 91 % | RESPIRATION RATE: 18 BRPM | TEMPERATURE: 100.6 F

## 2017-12-15 DIAGNOSIS — J10.1 INFLUENZA A: ICD-10-CM

## 2017-12-15 LAB
ALBUMIN SERPL-MCNC: 3.5 G/DL (ref 3.4–5)
ALP SERPL-CCNC: 101 U/L (ref 40–150)
ALT SERPL W P-5'-P-CCNC: 25 U/L (ref 0–50)
ANION GAP SERPL CALCULATED.3IONS-SCNC: 11 MMOL/L (ref 3–14)
AST SERPL W P-5'-P-CCNC: 27 U/L (ref 0–45)
BASOPHILS # BLD AUTO: 0 10E9/L (ref 0–0.2)
BASOPHILS NFR BLD AUTO: 0.3 %
BILIRUB SERPL-MCNC: 0.4 MG/DL (ref 0.2–1.3)
BUN SERPL-MCNC: 13 MG/DL (ref 7–30)
CALCIUM SERPL-MCNC: 8.2 MG/DL (ref 8.5–10.1)
CHLORIDE SERPL-SCNC: 98 MMOL/L (ref 94–109)
CO2 SERPL-SCNC: 23 MMOL/L (ref 20–32)
CREAT SERPL-MCNC: 0.97 MG/DL (ref 0.52–1.04)
DIFFERENTIAL METHOD BLD: NORMAL
EOSINOPHIL # BLD AUTO: 0 10E9/L (ref 0–0.7)
EOSINOPHIL NFR BLD AUTO: 0.4 %
ERYTHROCYTE [DISTWIDTH] IN BLOOD BY AUTOMATED COUNT: 14.7 % (ref 10–15)
FLUAV+FLUBV AG SPEC QL: NEGATIVE
FLUAV+FLUBV AG SPEC QL: POSITIVE
GFR SERPL CREATININE-BSD FRML MDRD: 58 ML/MIN/1.7M2
GLUCOSE SERPL-MCNC: 103 MG/DL (ref 70–99)
HCT VFR BLD AUTO: 40.9 % (ref 35–47)
HGB BLD-MCNC: 13.7 G/DL (ref 11.7–15.7)
IMM GRANULOCYTES # BLD: 0 10E9/L (ref 0–0.4)
IMM GRANULOCYTES NFR BLD: 0.1 %
LYMPHOCYTES # BLD AUTO: 1.2 10E9/L (ref 0.8–5.3)
LYMPHOCYTES NFR BLD AUTO: 16.8 %
MCH RBC QN AUTO: 28.2 PG (ref 26.5–33)
MCHC RBC AUTO-ENTMCNC: 33.5 G/DL (ref 31.5–36.5)
MCV RBC AUTO: 84 FL (ref 78–100)
MONOCYTES # BLD AUTO: 1 10E9/L (ref 0–1.3)
MONOCYTES NFR BLD AUTO: 14.5 %
NEUTROPHILS # BLD AUTO: 4.9 10E9/L (ref 1.6–8.3)
NEUTROPHILS NFR BLD AUTO: 67.9 %
PLATELET # BLD AUTO: 169 10E9/L (ref 150–450)
POTASSIUM SERPL-SCNC: 3.6 MMOL/L (ref 3.4–5.3)
PROT SERPL-MCNC: 7.6 G/DL (ref 6.8–8.8)
RBC # BLD AUTO: 4.85 10E12/L (ref 3.8–5.2)
SODIUM SERPL-SCNC: 132 MMOL/L (ref 133–144)
SPECIMEN SOURCE: ABNORMAL
WBC # BLD AUTO: 7.2 10E9/L (ref 4–11)

## 2017-12-15 PROCEDURE — 96360 HYDRATION IV INFUSION INIT: CPT | Performed by: EMERGENCY MEDICINE

## 2017-12-15 PROCEDURE — 71020 XR CHEST 2 VW: CPT

## 2017-12-15 PROCEDURE — 99284 EMERGENCY DEPT VISIT MOD MDM: CPT | Mod: 25 | Performed by: EMERGENCY MEDICINE

## 2017-12-15 PROCEDURE — 25000132 ZZH RX MED GY IP 250 OP 250 PS 637: Performed by: EMERGENCY MEDICINE

## 2017-12-15 PROCEDURE — 87804 INFLUENZA ASSAY W/OPTIC: CPT | Performed by: EMERGENCY MEDICINE

## 2017-12-15 PROCEDURE — 85025 COMPLETE CBC W/AUTO DIFF WBC: CPT | Performed by: EMERGENCY MEDICINE

## 2017-12-15 PROCEDURE — 80053 COMPREHEN METABOLIC PANEL: CPT | Performed by: EMERGENCY MEDICINE

## 2017-12-15 PROCEDURE — 99283 EMERGENCY DEPT VISIT LOW MDM: CPT | Mod: Z6 | Performed by: EMERGENCY MEDICINE

## 2017-12-15 PROCEDURE — 25000128 H RX IP 250 OP 636: Performed by: EMERGENCY MEDICINE

## 2017-12-15 RX ORDER — GUAIFENESIN/DEXTROMETHORPHAN 100-10MG/5
10 SYRUP ORAL EVERY 4 HOURS PRN
Status: DISCONTINUED | OUTPATIENT
Start: 2017-12-15 | End: 2017-12-15 | Stop reason: HOSPADM

## 2017-12-15 RX ADMIN — GUAIFENESIN AND DEXTROMETHORPHAN 10 ML: 100; 10 SYRUP ORAL at 19:19

## 2017-12-15 RX ADMIN — SODIUM CHLORIDE 1000 ML: 9 INJECTION, SOLUTION INTRAVENOUS at 19:06

## 2017-12-15 NOTE — ED AVS SNAPSHOT
[facility]    [address] [address]    Phone:  [phone]    Fax:  [phone]                                       Elizabeth Treviño   MRN: [id number]    Department:  [facility]   Date of Visit:  12/15/2017           After Visit Summary Signature Page     I have received my discharge instructions, and my questions have been answered. I have discussed any challenges I see with this plan with the nurse or doctor.    ..........................................................................................................................................  Patient/Patient Representative Signature      ..........................................................................................................................................  Patient Representative Print Name and Relationship to Patient    ..................................................               ................................................  Date                                            Time    ..........................................................................................................................................  Reviewed by Signature/Title    ...................................................              ..............................................  Date                                                            Time

## 2017-12-15 NOTE — ED NOTES
Pt returned home from [facility] and [place] yesterday.  C/o cough, fever, chills and feels lightheaded.  Pt states that her cough started Wednesday.  She feels like she has pneumonia.  Pt head hurts when she coughs.  Pt denies n/v/d.  Denies CP and SOA.  Pt states that if she breathes deeply she coughs a lot.   at bedside.

## 2017-12-15 NOTE — ED AVS SNAPSHOT
[facility] [place] [facility]    [address] [facility] [address] [address]    Phone:  [phone]    Fax:  [phone]                                       Elizabeth Treviño   MRN: [id number]    Department:  [facility] [place] [facility]   Date of Visit:  12/15/2017           Patient Information     Date Of Birth          1955        Your diagnoses for this visit were:     Influenza A        You were seen by Bishnu Peng MD.      Follow-up Information     Follow up with [facility] [place] [facility].    Specialty:  EMERGENCY MEDICINE    Why:  If symptoms worsen    Contact information:    [address] [place] [id number] [phone]    Additional information:    The medical center is located at   [address] [facility]. (between [address] in [place], [place] miles north   of [place]).        Follow up with Mariajose Dinh MD.    Specialty:  Internal Medicine    Why:  For re-evaluation if symptoms not resolving over the next 1 week        Discharge Instructions         Influenza (Adult)    Influenza is also called the flu. It is a viral illness that affects the air passages of your lungs. It is different from the common cold. The flu can easily be passed from one to person to another. It may be spread through the air by coughing and sneezing. Or it can be spread by touching the sick person and then touching your own eyes, nose, or mouth.  The flu starts 1 to 3 days after you are exposed to the flu virus. It may last for 1 to 2 weeks but many people feel tired or fatigued for many weeks afterward. You usually don t need to take antibiotics unless you have a complication. This might be an ear or sinus infection or pneumonia.  Symptoms of the flu may be mild or severe. They can include extreme tiredness (wanting to stay in bed all day), chills, fevers, muscle aches, soreness with eye movement, headache, and a dry, hacking cough.  Home care  Follow these  guidelines when caring for yourself at home:    Avoid being around cigarette smoke, whether yours or other people s.    Acetaminophen or ibuprofen will help ease your fever, muscle aches, and headache. Don t give aspirin to anyone younger than 18 who has the flu. Aspirin can harm the liver.    Nausea and loss of appetite are common with the flu. Eat light meals. Drink 6 to 8 glasses of liquids every day. Good choices are water, sport drinks, soft drinks without caffeine, juices, tea, and soup. Extra fluids will also help loosen secretions in your nose and lungs.    Over-the-counter cold medicines will not make the flu go away faster. But the medicines may help with coughing, sore throat, and congestion in your nose and sinuses. Don t use a decongestant if you have high blood pressure.    Stay home until your fever has been gone for at least 24 hours without using medicine to reduce fever.  Follow-up care  Follow up with your healthcare provider, or as advised, if you are not getting better over the next week.  If you are age 65 or older, talk with your provider about getting a pneumococcal vaccine every 5 years. You should also get this vaccine if you have chronic asthma or COPD. All adults should get a flu vaccine every fall. Ask your provider about this.  When to seek medical advice  Call your healthcare provider right away if any of these occur:    Cough with lots of colored mucus (sputum) or blood in your mucus    Chest pain, shortness of breath, wheezing, or trouble breathing    Severe headache, or face, neck, or ear pain    New rash with fever    Fever of 100.4 F (38 C) or higher, or as directed by your healthcare provider    Confusion, behavior change, or seizure    Severe weakness or dizziness    You get a new fever or cough after getting better for a few days  Date Last Reviewed: 1/1/2017 2000-2017 [employer]. [address] [place] [address], [place] [address]. All rights reserved. This information  is not intended as a substitute for professional medical care. Always follow your healthcare professional's instructions.          The Flu (Influenza)     The virus that causes the flu spreads through the air in droplets when someone who has the flu coughs, sneezes, laughs, or talks.   The flu (influenza) is an infection that affects your respiratory tract. This tract is made up of your mouth, nose, and lungs, and the passages between them. Unlike a cold, the flu can make you very ill. And it can lead to pneumonia, a serious lung infection. The flu can have serious complications and even cause death.  Who is at risk for the flu?  Anyone can get the flu. But you are more likely to become infected if you:    Have a weakened immune system    Work in a healthcare setting where you may be exposed to flu germs    Live or work with someone who has the flu    Haven t had an annual flu shot  How does the flu spread?  The flu is caused by a virus. The virus spreads through the air in droplets when someone who has the flu coughs, sneezes, laughs, or talks. You can become infected when you inhale these viruses directly. You can also become infected when you touch a surface on which the droplets have landed and then transfer the germs to your eyes, nose, or mouth. Touching used tissues, or sharing utensils, drinking glasses, or a toothbrush from an infected person can expose you to flu viruses, too.  What are the symptoms of the flu?  Flu symptoms tend to come on quickly and may last a few days to a few weeks. They include:    Fever usually higher than 100.4 F  (38 C) and chills    Sore throat and headache    Dry cough    Runny nose    Tiredness and weakness    Muscle aches  Who is at risk for flu complications?  For some people, the flu can be very serious. The risk for complications is greater for:    Children younger than age 5    Adults ages 65 and older    People with a chronic illness such as diabetes or heart, kidney, or  lung disease    People who live in a nursing home or long-term care facility   How is the flu treated?  The flu usually gets better after 7 days or so. In some cases, your healthcare provider may prescribe an antiviral medicine. This may help you get well a little sooner. For the medicine to help, you need to take it as soon as possible (ideally within 48 hours) after your symptoms start. If you develop pneumonia or other serious illness, you may need to stay in the hospital.  Easing flu symptoms    Drink lots of fluids such as water, juice, and warm soup. A good rule is to drink enough so that you urinate your normal amount.    Get plenty of rest.    Ask your healthcare provider what to take for fever and pain.    Call your provider if your fever is 100.4 F (38 C) or higher, or you become dizzy, lightheaded, or short of breath.  Taking steps to protect others    Wash your hands often, especially after coughing or sneezing. Or clean your hands with an alcohol-based hand  containing at least 60% alcohol.    Cough or sneeze into a tissue. Then throw the tissue away and wash your hands. If you don t have a tissue, cough and sneeze into your elbow.    Stay home until at least 24 hours after you no longer have a fever or chills. Be sure the fever isn t being hidden by fever-reducing medicine.    Don t share food, utensils, drinking glasses, or a toothbrush with others.    Ask your healthcare provider if others in your household should get antiviral medicine to help them avoid infection.  How can the flu be prevented?    One of the best ways to avoid the flu is to get a flu vaccine each year. The virus that causes the flu changes from year to year. For that reason, healthcare providers recommend getting the flu vaccine each year, as soon as it's available in your area. The vaccine is given as a shot. Your healthcare provider can tell you which vaccine is right for you. A nasal spray is also available  but is not recommended for the 2178-9551 flu season. The CDC says this is because the nasal spray did not seem to protect against the flu over the last several flu seasons. In the past, it was meant for people ages 2 to 49.    Wash your hands often. Frequent handwashing is a proven way to help prevent infection.    Carry an alcohol-based hand gel containing at least 60% alcohol. Use it when you can't use soap and water. Then wash your hands as soon as you can.    Avoid touching your eyes, nose, and mouth.    At home and work, clean phones, computer keyboards, and toys often with disinfectant wipes.    If possible, avoid close contact with others who have the flu or symptoms of the flu.  Handwashing tips  Handwashing is one of the best ways to prevent many common infections. If you are caring for or visiting someone with the flu, wash your hands each time you enter and leave the room. Follow these steps:    Use warm water and plenty of soap. Rub your hands together well.    Clean the whole hand, including under your nails, between your fingers, and up the wrists.    Wash for at least 15 seconds.    Rinse, letting the water run down your fingers, not up your wrists.    Dry your hands well. Use a paper towel to turn off the faucet and open the door.  Using alcohol-based hand   Alcohol-based hand  are also a good choice. Use them when you can't use soap and water. Follow these steps:    Squeeze about a tablespoon of gel into the palm of one hand.    Rub your hands together briskly, cleaning the backs of your hands, the palms, between your fingers, and up the wrists.    Rub until the gel is gone and your hands are completely dry.  Preventing the flu in healthcare settings  The flu is a special concern for people in hospitals and long-term care facilities. To help prevent the spread of flu, many hospitals and nursing homes take these steps:    Healthcare providers wash their hands or use an alcohol-based  hand  before and after treating each patient.    People with the flu have private rooms and bathrooms or share a room with someone with the same infection.    People who are at high risk for the flu but don't have it are encouraged to get the flu and pneumonia vaccines.    All healthcare workers are encouraged or required to get flu shots.   Date Last Reviewed: 12/1/2016 2000-2017 [employer]. [address] [place] [address], [place] [address]. All rights reserved. This information is not intended as a substitute for professional medical care. Always follow your healthcare professional's instructions.          24 Hour Appointment Hotline       To make an appointment at any [facility], call [phone] ([phone]). If you don't have a family doctor or clinic, we will help you find one. [facility] clinics are conveniently located to serve the needs of you and your family.             Review of your medicines      Our records show that you are taking the medicines listed below. If these are incorrect, please call your family doctor or clinic.        Dose / Directions Last dose taken    B COMPLEX 1 PO   Dose:  1 tablet        Take 1 tablet by mouth daily   Refills:  0        CALCIUM + D PO   Dose:  2 tablet        Take 2 tablets by mouth every evening 500 mg calcium   Refills:  0        levothyroxine 88 MCG tablet   Commonly known as:  SYNTHROID/LEVOTHROID   Dose:  88 mcg   Quantity:  90 tablet        Take 1 tablet (88 mcg) by mouth daily   Refills:  3        rosuvastatin 5 MG tablet   Commonly known as:  CRESTOR   Dose:  5 mg   Quantity:  90 tablet        Take 1 tablet (5 mg) by mouth daily   Refills:  1        VALTREX 500 MG tablet   Dose:  500 mg   Quantity:  30 tablet   Generic drug:  valACYclovir        Take 1 tablet (500 mg) by mouth 2 times daily   Refills:  3        VITAMIN D-3 PO   Dose:  1000 Int'l Units        Take 1,000 Int'l Units by mouth every evening   Refills:  0                 Procedures and tests performed during your visit     CBC with platelets differential    Comprehensive metabolic panel    Influenza A/B antigen    XR Chest 2 Views      Orders Needing Specimen Collection     None      Pending Results     Date and Time Order Name Status Description    12/15/2017 1924 XR Chest 2 Views Preliminary             Pending Culture Results     No orders found from 12/13/2017 to 12/16/2017.            Pending Results Instructions     If you had any lab results that were not finalized at the time of your Discharge, you can call the ED Lab Result RN at [phone]. You will be contacted by this team for any positive Lab results or changes in treatment. The nurses are available 7 days a week from 10A to 6:30P.  You can leave a message 24 hours per day and they will return your call.        Test Results From Your Hospital Stay        12/15/2017  6:48 PM      Component Results     Component Value Ref Range & Units Status    Sodium 132 (L) 133 - 144 mmol/L Final    Potassium 3.6 3.4 - 5.3 mmol/L Final    Chloride 98 94 - 109 mmol/L Final    Carbon Dioxide 23 20 - 32 mmol/L Final    Anion Gap 11 3 - 14 mmol/L Final    Glucose 103 (H) 70 - 99 mg/dL Final    Urea Nitrogen 13 7 - 30 mg/dL Final    Creatinine 0.97 0.52 - 1.04 mg/dL Final    GFR Estimate 58 (L) >60 mL/min/1.7m2 Final    Non  GFR Calc    GFR Estimate If Black 70 >60 mL/min/1.7m2 Final    African American GFR Calc    Calcium 8.2 (L) 8.5 - 10.1 mg/dL Final    Bilirubin Total 0.4 0.2 - 1.3 mg/dL Final    Albumin 3.5 3.4 - 5.0 g/dL Final    Protein Total 7.6 6.8 - 8.8 g/dL Final    Alkaline Phosphatase 101 40 - 150 U/L Final    ALT 25 0 - 50 U/L Final    AST 27 0 - 45 U/L Final         12/15/2017  6:55 PM      Component Results     Component Value Ref Range & Units Status    WBC 7.2 4.0 - 11.0 10e9/L Final    RBC Count 4.85 3.8 - 5.2 10e12/L Final    Hemoglobin 13.7 11.7 - 15.7 g/dL Final    Hematocrit 40.9 35.0 - 47.0 %  Final    MCV 84 78 - 100 fl Final    MCH 28.2 26.5 - 33.0 pg Final    MCHC 33.5 31.5 - 36.5 g/dL Final    RDW 14.7 10.0 - 15.0 % Final    Platelet Count 169 150 - 450 10e9/L Final    Diff Method Automated Method  Final    % Neutrophils 67.9 % Final    % Lymphocytes 16.8 % Final    % Monocytes 14.5 % Final    % Eosinophils 0.4 % Final    % Basophils 0.3 % Final    % Immature Granulocytes 0.1 % Final    Absolute Neutrophil 4.9 1.6 - 8.3 10e9/L Final    Absolute Lymphocytes 1.2 0.8 - 5.3 10e9/L Final    Absolute Monocytes 1.0 0.0 - 1.3 10e9/L Final    Absolute Eosinophils 0.0 0.0 - 0.7 10e9/L Final    Absolute Basophils 0.0 0.0 - 0.2 10e9/L Final    Abs Immature Granulocytes 0.0 0 - 0.4 10e9/L Final         12/15/2017  6:41 PM      Component Results     Component Value Ref Range & Units Status    Influenza A/B Agn Specimen Nasal  Final    Influenza A Positive (A) NEG^Negative Final    Influenza B Negative NEG^Negative Final    Test results must be correlated with clinical data. If necessary, results   should be confirmed by a molecular assay or viral culture.           12/15/2017  7:49 PM      Narrative     CHEST TWO VIEWS 12/15/2017 7:41 PM     COMPARISON: Two-view chest x-ray 7/24/2009.    HISTORY: Rule out pneumonia, positive influenza A.    FINDINGS: The cardiac silhouette, pulmonary vasculature, lungs and  pleural spaces are within normal limits.        Impression     IMPRESSION: Clear lungs.                Thank you for choosing [facility]       Thank you for choosing [facility] for your care. Our goal is always to provide you with excellent care. Hearing back from our patients is one way we can continue to improve our services. Please take a few minutes to complete the written survey that you may receive in the mail after you visit with us. Thank you!        [facility] Information     [employer] gives you secure access to your electronic health record. If you see a primary care provider, you can also send messages to your  care team and make appointments. If you have questions, please call your primary care clinic.  If you do not have a primary care provider, please call [phone] and they will assist you.        Care EveryWhere ID     This is your Care EveryWhere ID. This could be used by other organizations to access your [facility] medical records  [id number]        Equal Access to Services     CHRISTIANNE DREW : Saurav Mcdonald, torsten gaspar, sailaja quinn . So [facility] [phone].    ATENCIÓN: Si habla español, tiene a tomas disposición servicios gratuitos de asistencia lingüística. Llame al [phone].    We comply with applicable federal civil rights laws and [place] laws. We do not discriminate on the basis of race, color, national origin, age, disability, sex, sexual orientation, or gender identity.            After Visit Summary       This is your record. Keep this with you and show to your community pharmacist(s) and doctor(s) at your next visit.

## 2017-12-16 NOTE — ED PROVIDER NOTES
History     Chief Complaint   Patient presents with     Generalized Weakness     fever,chills lightheaded cough chest soreness     HPI   History per patient and her significant other.  Elizabeth Treviño is a 62 year old female with 2.5 days of cough, fever, chills, myalgias, fatigue and malaise.  She has anterior pleuritic chest tightness/soreness.  She feels lightheaded.  No hemoptysis, leg pain or leg swelling. Recent travel abroad. Non-smoker.  Other pertinent history or acute complaints or concerns.    Patient Active Problem List    Diagnosis Date Noted     Pigmented skin lesion 01/08/2013     Priority: Medium     SK (seborrheic keratosis) 01/08/2013     Priority: Medium     Seborrheic dermatitis 01/08/2013     Priority: Medium     Diagnosis updated by automated process. Provider to review and confirm.       Acquired hypothyroidism 09/26/2012     Priority: Medium     Problem list name updated by automated process. Provider to review       Easy bruising 09/26/2012     Priority: Medium     Osteoporosis 09/26/2012     Priority: Medium     Problem list name updated by automated process. Provider to review       PSVT (paroxysmal supraventricular tachycardia) (H) 09/26/2012     Priority: Medium     Abnormal weight gain 09/26/2012     Priority: Medium        Past Medical History:    Past Medical History:   Diagnosis Date     Abnormal weight gain 9/26/2012     Acquired hypothyroidism 9/26/2012     Early menopause      Endometriosis      Osteoporosis, unspecified 9/26/2012     PSVT (paroxysmal supraventricular tachycardia) (H) 9/26/2012       Past Surgical History:    Past Surgical History:   Procedure Laterality Date     BREAST SURGERY Left 1981    benign cyst      COLONOSCOPY  2015    repeat in 10 yrs      ovary removed Left 1986    partial removal of right ovary 1990       Family History:    Family History   Problem Relation Age of Onset     Hypertension Mother      CEREBROVASCULAR DISEASE Mother      Macular  Degeneration Mother      Hypertension Sister      CANCER Father      Lung cancer     Respiratory Father      COPD     CANCER Brother      Lung cancer     Thyroid Disease Sister      Thyroid Disease Sister      Amblyopia No family hx of      Retinal detachment No family hx of      Glaucoma No family hx of        Social History:  Marital Status:   [2]  Social History   Substance Use Topics     Smoking status: Never Smoker     Smokeless tobacco: Never Used     Alcohol use No        Medications:      Cholecalciferol (VITAMIN D-3 PO)   rosuvastatin (CRESTOR) 5 MG tablet   levothyroxine (SYNTHROID/LEVOTHROID) 88 MCG tablet   Calcium Carbonate-Vitamin D (CALCIUM + D PO)   B Complex Vitamins (B COMPLEX 1 PO)   VALTREX 500 MG tablet         Review of Systems  Patient was provided instructions for supportive care and will return as needed for worsened condition or worsening symptoms, or new problems or concerns.    Physical Exam   BP: 104/67  Heart Rate: 106  Temp: 100.6  F (38.1  C)  Resp: 18  SpO2: 93 %      Physical Exam   Constitutional: She is oriented to person, place, and time. She appears well-developed and well-nourished. No distress.   HENT:   Head: Normocephalic and atraumatic.   Mouth/Throat: Oropharynx is clear and moist.   Eyes: Conjunctivae and EOM are normal. No scleral icterus.   Neck: Normal range of motion. Neck supple. No tracheal deviation present.   Cardiovascular: Normal rate, regular rhythm, normal heart sounds and intact distal pulses.  Exam reveals no gallop and no friction rub.    No murmur heard.  Pulmonary/Chest: Effort normal and breath sounds normal. No respiratory distress. She has no wheezes. She has no rales.   Abdominal: Soft. There is no tenderness.   Musculoskeletal: Normal range of motion. She exhibits no edema.   Neurological: She is alert and oriented to person, place, and time.   Skin: Skin is warm and dry. No rash noted. She is not diaphoretic. No erythema. No pallor.    Psychiatric: Her behavior is normal.   Flat affect.   Nursing note and vitals reviewed.      ED Course     ED Course     Procedures           I independently reviewed the X-rays: Agree with the Radiologist's interpretation.  Results for orders placed or performed during the hospital encounter of 12/15/17   XR Chest 2 Views    Narrative    CHEST TWO VIEWS 12/15/2017 7:41 PM     COMPARISON: Two-view chest x-ray 7/24/2009.    HISTORY: Rule out pneumonia, positive influenza A.    FINDINGS: The cardiac silhouette, pulmonary vasculature, lungs and  pleural spaces are within normal limits.      Impression    IMPRESSION: Clear lungs.    CHARLI MCDANIEL MD   Comprehensive metabolic panel   Result Value Ref Range    Sodium 132 (L) 133 - 144 mmol/L    Potassium 3.6 3.4 - 5.3 mmol/L    Chloride 98 94 - 109 mmol/L    Carbon Dioxide 23 20 - 32 mmol/L    Anion Gap 11 3 - 14 mmol/L    Glucose 103 (H) 70 - 99 mg/dL    Urea Nitrogen 13 7 - 30 mg/dL    Creatinine 0.97 0.52 - 1.04 mg/dL    GFR Estimate 58 (L) >60 mL/min/1.7m2    GFR Estimate If Black 70 >60 mL/min/1.7m2    Calcium 8.2 (L) 8.5 - 10.1 mg/dL    Bilirubin Total 0.4 0.2 - 1.3 mg/dL    Albumin 3.5 3.4 - 5.0 g/dL    Protein Total 7.6 6.8 - 8.8 g/dL    Alkaline Phosphatase 101 40 - 150 U/L    ALT 25 0 - 50 U/L    AST 27 0 - 45 U/L   CBC with platelets differential   Result Value Ref Range    WBC 7.2 4.0 - 11.0 10e9/L    RBC Count 4.85 3.8 - 5.2 10e12/L    Hemoglobin 13.7 11.7 - 15.7 g/dL    Hematocrit 40.9 35.0 - 47.0 %    MCV 84 78 - 100 fl    MCH 28.2 26.5 - 33.0 pg    MCHC 33.5 31.5 - 36.5 g/dL    RDW 14.7 10.0 - 15.0 %    Platelet Count 169 150 - 450 10e9/L    Diff Method Automated Method     % Neutrophils 67.9 %    % Lymphocytes 16.8 %    % Monocytes 14.5 %    % Eosinophils 0.4 %    % Basophils 0.3 %    % Immature Granulocytes 0.1 %    Absolute Neutrophil 4.9 1.6 - 8.3 10e9/L    Absolute Lymphocytes 1.2 0.8 - 5.3 10e9/L    Absolute Monocytes 1.0 0.0 - 1.3 10e9/L     Absolute Eosinophils 0.0 0.0 - 0.7 10e9/L    Absolute Basophils 0.0 0.0 - 0.2 10e9/L    Abs Immature Granulocytes 0.0 0 - 0.4 10e9/L   Influenza A/B antigen   Result Value Ref Range    Influenza A/B Agn Specimen Nasal     Influenza A Positive (A) NEG^Negative    Influenza B Negative NEG^Negative          Medications   0.9% sodium chloride BOLUS (0 mLs Intravenous Stopped 12/15/17 2003)     Patient was given an IV fluid bolus of 1 L normal saline and Robitussin-DM cough syrup.    Assessments & Plan (with Medical Decision Making)   Uncomplicated Influenza A with onset of symptoms over 2 days ago.  Not a candidate for Tamiflu therapy.  She felt improved after IV fluid bolus and was discharged home with her significant other with instruction for supportive care.  Patient was provided instructions for supportive care and will return as needed for worsened condition or worsening symptoms, or new problems or concerns.    I have reviewed the nursing notes.    I have reviewed the findings, diagnosis, plan and need for follow up with the patient.    Discharge Medication List as of 12/15/2017  8:16 PM   Ibuprofen 600 mg po q 6 hrs prn (OTC)         Final diagnoses:   Influenza A       12/15/2017   [facility] EMERGENCY DEPARTMENT     Bishnu Peng MD  12/20/17 0050

## 2017-12-16 NOTE — DISCHARGE INSTRUCTIONS
Influenza (Adult)    Influenza is also called the flu. It is a viral illness that affects the air passages of your lungs. It is different from the common cold. The flu can easily be passed from one to person to another. It may be spread through the air by coughing and sneezing. Or it can be spread by touching the sick person and then touching your own eyes, nose, or mouth.  The flu starts 1 to 3 days after you are exposed to the flu virus. It may last for 1 to 2 weeks but many people feel tired or fatigued for many weeks afterward. You usually don t need to take antibiotics unless you have a complication. This might be an ear or sinus infection or pneumonia.  Symptoms of the flu may be mild or severe. They can include extreme tiredness (wanting to stay in bed all day), chills, fevers, muscle aches, soreness with eye movement, headache, and a dry, hacking cough.  Home care  Follow these guidelines when caring for yourself at home:    Avoid being around cigarette smoke, whether yours or other people s.    Acetaminophen or ibuprofen will help ease your fever, muscle aches, and headache. Don t give aspirin to anyone younger than 18 who has the flu. Aspirin can harm the liver.    Nausea and loss of appetite are common with the flu. Eat light meals. Drink 6 to 8 glasses of liquids every day. Good choices are water, sport drinks, soft drinks without caffeine, juices, tea, and soup. Extra fluids will also help loosen secretions in your nose and lungs.    Over-the-counter cold medicines will not make the flu go away faster. But the medicines may help with coughing, sore throat, and congestion in your nose and sinuses. Don t use a decongestant if you have high blood pressure.    Stay home until your fever has been gone for at least 24 hours without using medicine to reduce fever.  Follow-up care  Follow up with your healthcare provider, or as advised, if you are not getting better over the next week.  If you are age 65 or  older, talk with your provider about getting a pneumococcal vaccine every 5 years. You should also get this vaccine if you have chronic asthma or COPD. All adults should get a flu vaccine every fall. Ask your provider about this.  When to seek medical advice  Call your healthcare provider right away if any of these occur:    Cough with lots of colored mucus (sputum) or blood in your mucus    Chest pain, shortness of breath, wheezing, or trouble breathing    Severe headache, or face, neck, or ear pain    New rash with fever    Fever of 100.4 F (38 C) or higher, or as directed by your healthcare provider    Confusion, behavior change, or seizure    Severe weakness or dizziness    You get a new fever or cough after getting better for a few days  Date Last Reviewed: 1/1/2017 2000-2017 [employer]. [address] [place] [address]. All rights reserved. This information is not intended as a substitute for professional medical care. Always follow your healthcare professional's instructions.          The Flu (Influenza)     The virus that causes the flu spreads through the air in droplets when someone who has the flu coughs, sneezes, laughs, or talks.   The flu (influenza) is an infection that affects your respiratory tract. This tract is made up of your mouth, nose, and lungs, and the passages between them. Unlike a cold, the flu can make you very ill. And it can lead to pneumonia, a serious lung infection. The flu can have serious complications and even cause death.  Who is at risk for the flu?  Anyone can get the flu. But you are more likely to become infected if you:    Have a weakened immune system    Work in a healthcare setting where you may be exposed to flu germs    Live or work with someone who has the flu    Haven t had an annual flu shot  How does the flu spread?  The flu is caused by a virus. The virus spreads through the air in droplets when someone who has the flu coughs, sneezes,  laughs, or talks. You can become infected when you inhale these viruses directly. You can also become infected when you touch a surface on which the droplets have landed and then transfer the germs to your eyes, nose, or mouth. Touching used tissues, or sharing utensils, drinking glasses, or a toothbrush from an infected person can expose you to flu viruses, too.  What are the symptoms of the flu?  Flu symptoms tend to come on quickly and may last a few days to a few weeks. They include:    Fever usually higher than 100.4 F  (38 C) and chills    Sore throat and headache    Dry cough    Runny nose    Tiredness and weakness    Muscle aches  Who is at risk for flu complications?  For some people, the flu can be very serious. The risk for complications is greater for:    Children younger than age 5    Adults ages 65 and older    People with a chronic illness such as diabetes or heart, kidney, or lung disease    People who live in a nursing home or long-term care facility   How is the flu treated?  The flu usually gets better after 7 days or so. In some cases, your healthcare provider may prescribe an antiviral medicine. This may help you get well a little sooner. For the medicine to help, you need to take it as soon as possible (ideally within 48 hours) after your symptoms start. If you develop pneumonia or other serious illness, you may need to stay in the hospital.  Easing flu symptoms    Drink lots of fluids such as water, juice, and warm soup. A good rule is to drink enough so that you urinate your normal amount.    Get plenty of rest.    Ask your healthcare provider what to take for fever and pain.    Call your provider if your fever is 100.4 F (38 C) or higher, or you become dizzy, lightheaded, or short of breath.  Taking steps to protect others    Wash your hands often, especially after coughing or sneezing. Or clean your hands with an alcohol-based hand  containing at least 60% alcohol.    Cough or sneeze  into a tissue. Then throw the tissue away and wash your hands. If you don t have a tissue, cough and sneeze into your elbow.    Stay home until at least 24 hours after you no longer have a fever or chills. Be sure the fever isn t being hidden by fever-reducing medicine.    Don t share food, utensils, drinking glasses, or a toothbrush with others.    Ask your healthcare provider if others in your household should get antiviral medicine to help them avoid infection.  How can the flu be prevented?    One of the best ways to avoid the flu is to get a flu vaccine each year. The virus that causes the flu changes from year to year. For that reason, healthcare providers recommend getting the flu vaccine each year, as soon as it's available in your area. The vaccine is given as a shot. Your healthcare provider can tell you which vaccine is right for you. A nasal spray is also available but is not recommended for the 2890-1951 flu season. The CDC says this is because the nasal spray did not seem to protect against the flu over the last several flu seasons. In the past, it was meant for people ages 2 to 49.    Wash your hands often. Frequent handwashing is a proven way to help prevent infection.    Carry an alcohol-based hand gel containing at least 60% alcohol. Use it when you can't use soap and water. Then wash your hands as soon as you can.    Avoid touching your eyes, nose, and mouth.    At home and work, clean phones, computer keyboards, and toys often with disinfectant wipes.    If possible, avoid close contact with others who have the flu or symptoms of the flu.  Handwashing tips  Handwashing is one of the best ways to prevent many common infections. If you are caring for or visiting someone with the flu, wash your hands each time you enter and leave the room. Follow these steps:    Use warm water and plenty of soap. Rub your hands together well.    Clean the whole hand, including under your nails, between your fingers,  and up the wrists.    Wash for at least 15 seconds.    Rinse, letting the water run down your fingers, not up your wrists.    Dry your hands well. Use a paper towel to turn off the faucet and open the door.  Using alcohol-based hand   Alcohol-based hand  are also a good choice. Use them when you can't use soap and water. Follow these steps:    Squeeze about a tablespoon of gel into the palm of one hand.    Rub your hands together briskly, cleaning the backs of your hands, the palms, between your fingers, and up the wrists.    Rub until the gel is gone and your hands are completely dry.  Preventing the flu in healthcare settings  The flu is a special concern for people in hospitals and long-term care facilities. To help prevent the spread of flu, many hospitals and nursing homes take these steps:    Healthcare providers wash their hands or use an alcohol-based hand  before and after treating each patient.    People with the flu have private rooms and bathrooms or share a room with someone with the same infection.    People who are at high risk for the flu but don't have it are encouraged to get the flu and pneumonia vaccines.    All healthcare workers are encouraged or required to get flu shots.   Date Last Reviewed: 12/1/2016 2000-2017 [employer]. [address] [place] [address], [place] [address]. All rights reserved. This information is not intended as a substitute for professional medical care. Always follow your healthcare professional's instructions.

## 2017-12-18 ENCOUNTER — MYC MEDICAL ADVICE (OUTPATIENT)
Dept: FAMILY MEDICINE | Facility: CLINIC | Age: 62
End: 2017-12-18

## 2017-12-18 DIAGNOSIS — B00.1 RECURRENT COLD SORES: ICD-10-CM

## 2017-12-18 RX ORDER — VALACYCLOVIR HCL 500 MG
500 TABLET ORAL 2 TIMES DAILY
Qty: 30 TABLET | Refills: 3 | Status: SHIPPED | OUTPATIENT
Start: 2017-12-18

## 2017-12-22 ENCOUNTER — RADIANT APPOINTMENT (OUTPATIENT)
Dept: BONE DENSITY | Facility: CLINIC | Age: 62
End: 2017-12-22
Attending: FAMILY MEDICINE
Payer: COMMERCIAL

## 2017-12-22 DIAGNOSIS — M85.89 OSTEOPENIA OF MULTIPLE SITES: ICD-10-CM

## 2018-01-03 ENCOUNTER — OFFICE VISIT (OUTPATIENT)
Dept: FAMILY MEDICINE | Facility: CLINIC | Age: 63
End: 2018-01-03
Payer: COMMERCIAL

## 2018-01-03 VITALS
TEMPERATURE: 97.8 F | HEART RATE: 67 BPM | SYSTOLIC BLOOD PRESSURE: 124 MMHG | DIASTOLIC BLOOD PRESSURE: 75 MMHG | OXYGEN SATURATION: 95 %

## 2018-01-03 DIAGNOSIS — R07.0 THROAT PAIN: Primary | ICD-10-CM

## 2018-01-03 DIAGNOSIS — M81.0 AGE RELATED OSTEOPOROSIS, UNSPECIFIED PATHOLOGICAL FRACTURE PRESENCE: ICD-10-CM

## 2018-01-03 LAB
INTERNAL QC OK POCT: YES
S PYO AG THROAT QL IA.RAPID: NORMAL

## 2018-01-03 ASSESSMENT — PAIN SCALES - GENERAL: PAINLEVEL: NO PAIN (0)

## 2018-01-03 NOTE — PATIENT INSTRUCTIONS
[facility]: [phone]     [facility] Medication Refill Request Information:  * Please contact your pharmacy regarding ANY request for medication refills.  ** [facility] Prescription Fax = [phone]  * Please allow 3 business days for routine medication refills.  * Please allow 5 business days for controlled substance medication refills.     [facility] Test Result notification information:  *You will be notified with in 7-10 days of your appointment day regarding the results of your test.  If you are on MyChart you will be notified as soon as the provider has reviewed the results and signed off on them.

## 2018-01-03 NOTE — MR AVS SNAPSHOT
After Visit Summary   1/3/2018    Elizabeth Treviño    MRN: [id number]           Patient Information     Date Of Birth          1955        Visit Information        Provider Department      1/3/2018 5:30 PM Coleen Smith MD PhD [facility]        Today's Diagnoses     Throat pain    -  1    Age related osteoporosis, unspecified pathological fracture presence          Care Instructions    Primary Care Center: [phone]     [facility] Medication Refill Request Information:  * Please contact your pharmacy regarding ANY request for medication refills.  ** Psychiatric Prescription Fax = [phone]  * Please allow 3 business days for routine medication refills.  * Please allow 5 business days for controlled substance medication refills.     Primary Care Center Test Result notification information:  *You will be notified with in 7-10 days of your appointment day regarding the results of your test.  If you are on MyChart you will be notified as soon as the provider has reviewed the results and signed off on them.          Follow-ups after your visit        Follow-up notes from your care team     Return in about 6 months (around 7/3/2018) for Physical Exam.      Who to contact     Please call your clinic at [phone] to:    Ask questions about your health    Make or cancel appointments    Discuss your medicines    Learn about your test results    Speak to your doctor   If you have compliments or concerns about an experience at your clinic, or if you wish to file a complaint, please contact [facility] Patient Relations at [phone] or email us at [email or web address]         Additional Information About Your Visit        MyChart Information     [employer] gives you secure access to your electronic health record. If you see a primary care provider, you can also send messages to your care team and make appointments. If you have questions,  please call your primary care clinic.  If you do not have a primary care provider, please call [phone] and they will assist you.      [employer] is an electronic gateway that provides easy, online access to your medical records. With [employer], you can request a clinic appointment, read your test results, renew a prescription or communicate with your care team.     To access your existing account, please contact your [facility] or call [phone] for assistance.        Care EveryWhere ID     This is your Care EveryWhere ID. This could be used by other organizations to access your [facility] medical records  [id number]        Your Vitals Were     Pulse Temperature Pulse Oximetry Breastfeeding?          67 97.8  F (36.6  C) (Oral) 95% No         Blood Pressure from Last 3 Encounters:   01/03/18 124/75   12/15/17 129/66   11/29/17 118/78    Weight from Last 3 Encounters:   11/29/17 109.4 kg (241 lb 3.2 oz)   08/04/17 110.3 kg (243 lb 3.2 oz)   08/22/16 108.4 kg (239 lb)              We Performed the Following     Rapid strep screen POCT     Strep group A culture        Primary Care Provider Office Phone # Fax #    Coleen Smith MD PhD [phone]       [address] [place], [place] [address]        Equal Access to Services     CHRISTIANNE DREW : Hadii quin ku hadasho Soomaali, waaxda luqadaha, qaybta kaalmada adeegyada, sailaja joe haydoris ramos . So [facility] [phone].    ATENCIÓN: Si habla español, tiene a tomas disposición servicios gratuitos de asistencia lingüística. Llame al [phone].    We comply with applicable federal civil rights laws and [place] laws. We do not discriminate on the basis of race, color, national origin, age, disability, sex, sexual orientation, or gender identity.            Thank you!     Thank you for choosing [facility]  for your care. Our goal is always to provide you with excellent care. Hearing back  from our patients is one way we can continue to improve our services. Please take a few minutes to complete the written survey that you may receive in the mail after your visit with us. Thank you!             Your Updated Medication List - Protect others around you: Learn how to safely use, store and throw away your medicines at www.disposemymeds.org.          This list is accurate as of: 1/3/18  6:32 PM.  Always use your most recent med list.                   Brand Name Dispense Instructions for use Diagnosis    B COMPLEX 1 PO      Take 1 tablet by mouth daily        CALCIUM + D PO      Take 2 tablets by mouth every evening 500 mg calcium        levothyroxine 88 MCG tablet    SYNTHROID/LEVOTHROID    90 tablet    Take 1 tablet (88 mcg) by mouth daily    Acquired hypothyroidism       rosuvastatin 5 MG tablet    CRESTOR    90 tablet    Take 1 tablet (5 mg) by mouth daily    Mixed hyperlipidemia       VALTREX 500 MG tablet   Generic drug:  valACYclovir     30 tablet    Take 1 tablet (500 mg) by mouth 2 times daily    Recurrent cold sores       VITAMIN D-3 PO      Take 1,000 Int'l Units by mouth every evening

## 2018-01-03 NOTE — PROGRESS NOTES
Elizabeth is a  62 year old female postmenopausal G0 that presents today for osteoporosis consult. She has a hx of OP with hx of fracture of a wrist and took 5 yrs of alendronate - finishing in 8/2015   Referring Physician: Referred Self    HPI     Have you ever had a bone density test? Yes    Where = [facility]  When = 2005, 2009, 2012, 2014, 2017  Results from 2017 scan:  Spine Tscore = -1.4  Left neck Tscore = -1.1  Total left hip Tscore = -0.2  Right neck Tscore = -1.5  Total Right hip Tscore = 0.1  Compared to 2014, no significant change      Have you received any x-ray dye or contrast in the last ten days? No  How many servings of dairy products do you consume per day? 1 - 2 servings between milk, cheese, yogurt  Do you take a multi-vitamin daily? No  Do you take a vitamin D supplement? Yes 1000 units  Do you take a calcium supplement daily? Calcium carbonate 600 mg  Do you take a supplement containing strontium? No  Are you exposed to natural sunlight at least 20 minutes three times a week? Spring and summer  Have you broken any bones during your adult life? Yes. Details: left wrist in early 50's  How tall were you at age 25? 5 feet 9 inches  Have you gone through menopause? Yes  Did your menopause occur before age 45? 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 Yes. Details: estrogen for short period, as above    Social History reports that she has never smoked. She has never used smokeless tobacco. She reports that she does not drink alcohol or use illicit drugs.  Do you smoke cigarettes? No  Do you exercise? Yes. Details: walking, 5000 steps per day throughout the day, no strength training  Do you drink alcohol? No    Medication History  Have you taken any of the following medications?   Blood thinner (Coumadin or Heparin): No   Chemotherapy (ex: Lupron, Arimidex): No   Depo Provera: No   Fertility treatments: yes, in her  30's   Anti-Seizure (ex: Dilantin, Depakote): No   Steroids (ex: Prednisone, Cortisone): No   Thyroid (ex: Synthroid): Yes  Have you used any of the following medications?   Actonel (Risedronate): No   Aredia (Pamidronate): No   Boniva (Ibindronate): No   Didronil (Etidronate): No   Evista (Raloxifene): No   Fosamax (Alendronate): Yes (was on 70 mg weekly, stopped in August 2015)   Forteo (Parathyroid hormone) injections: No   HCTZ (Thiazide): No   Calcitonin nasal spray: No   Reclast or Zometa (Zolendronate): No   Prolia (Denosumab): No   HT: Short period in her 30's  Current Outpatient Prescriptions   Medication Sig Dispense Refill     VALTREX 500 MG tablet Take 1 tablet (500 mg) by mouth 2 times daily 30 tablet 3     Cholecalciferol (VITAMIN D-3 PO) Take 1,000 Int'l Units by mouth every evening        rosuvastatin (CRESTOR) 5 MG tablet Take 1 tablet (5 mg) by mouth daily 90 tablet 1     levothyroxine (SYNTHROID/LEVOTHROID) 88 MCG tablet Take 1 tablet (88 mcg) by mouth daily 90 tablet 3     Calcium Carbonate-Vitamin D (CALCIUM + D PO) Take 2 tablets by mouth every evening 500 mg calcium         B Complex Vitamins (B COMPLEX 1 PO) Take 1 tablet by mouth daily          Allergies:  No known drug allergies.     Past Medical History  Do you have or have you had any of the following?   Celiac sprue (wheat intolerance):No   Chronic low back problems (ex: scoliosis, arthritis): No   Diabetes mellitus: No   High blood calcium level: No   Hip or spine injury: No   History of an eating disorder: No   History of gastric bypass: No   Hyperparathyroidism: No   Inflammatory bowel disease (ex: Crohn's, ulcerative colitis): No   Kidney disease: No   Kidney stones: No   Liver disease: No   Lupus: No   Overactive thyroid gland: No   Rheumatoid arthritis: No    Have you had any of the following?   Hysterectomy: No   Ovaries removed: Yes (one and a half due to endometriosis)   Breast cancer: No   Family history of breast cancer: Yes.  Details: cousins, paternal aunt    Family History   Problem Relation Age of Onset     Hypertension Mother      CEREBROVASCULAR DISEASE Mother      Macular Degeneration Mother      Hypertension Sister      CANCER Father      Lung cancer     Respiratory Father      COPD     CANCER Brother      Lung cancer     Thyroid Disease Sister      Thyroid Disease Sister      Amblyopia No family hx of      Retinal detachment No family hx of      Glaucoma No family hx of      Is there a family history of osteoporosis? Yes. Details: mother  Did either parent have a hip fracture? No    Fever chills night sweats, sore throat  ROS:  General: night sweats, fever and chills  Head/Eyes: none  Ears/Nose/Throat: cough, sore throat, ear pain  Cardiovascular: none  Respiratory: cough, shortness of breath and wheezing  Gastrointestinal: none  Breast: none  Genitourinary: none  Sexual Function: none  Musculoskeletal: none  Skin: none  Neurological: none  Mental Health: none  Endocrine: thyroid disease    Symptoms listed above are related to influenza which was diagnosed in urgent care 12/15/17.  Patient continued to feel ill the following week until [place].  She continues to have a slight sore throat and left earache since that time.  She still has a congested cough as well.  She denies any ongoing fevers or chills.    Clinic Measurements  Vitals: /75  Pulse 67  Temp 97.8  F (36.6  C) (Oral)  SpO2 95%  Breastfeeding? No  BMI= There is no height or weight on file to calculate BMI.    Physical exam  Constitutional: Well appearing woman in no acute distress.   Psychological: appropriate mood.  ENT: right TM normal, left TM with scarring, otherwise normal, Posterior oropharynx with mild erythema, no drainage.  Neck: Left anterior cervical area tender, no palpable lymph nodes. No carotid bruits.  Cardiovascular: regular rate and rhythm, normal S1 and S2, no murmurs, rubs or gallops  Respiratory: clear to auscultation, no wheezes or  crackles, normal breath sounds.  Gastrointestinal: Soft, nontender, no hepatosplenomegaly, no masses. No guarding or rebound.  Musculoskeletal: Spine straight, full range of motion of spine with flexion, extension, and rotation  Skin: no concerning lesions, no jaundice.  Neurological:Normal gait, no tremor. Normal strength 5/5      LAB  FRAX Assessment Tool: , 7.8% for 10 risk Major Osteoporotic, 0.7% for 10 risk of Hip Fracture]  Risk Factors: postmenopausal, family history, wrist fracture, hypothyroidism    ASSESSMENT and PLAN:  Throat pain  Symptoms likely related to patient's recent diagnosis of influenza A.  Rapid strep done due to patient concern for this and is negative.  - Rapid strep screen POCT    Age related osteoporosis, unspecified pathological fracture presence  Comparable densities since 2014. Has not had significant loss.  Has osteopenia by T scores. She took 5 yrs of alendronate and off since 8/2015.   FRAX has low probability for fracture.  Will have her continue Calcium and Vitamin D.      Plan for repeat DXA in 2 years.    I spent  25 minutes with this patient.  > 50% of time spent in counseling and coordination of care      Scribe Disclosure:   I, Sari Terrance, am serving as a scribe to document services personally performed by Coleen Smith MD PhD at this visit, based upon the provider's statements to me. All documentation has been reviewed by the aforementioned provider prior to being entered into the official medical record.     Portions of this medical record were completed by a scribe. UPON MY REVIEW AND AUTHENTICATION BY ELECTRONIC SIGNATURE, this confirms (a) I performed the applicable clinical services, and (b) the record is accurate.

## 2018-01-03 NOTE — NURSING NOTE
Chief Complaint   Patient presents with     Results     Patient is here to follow up on results.      Erica Ortega LPN at 5:16 PM on 1/3/2018.

## 2018-01-05 LAB
BACTERIA SPEC CULT: NORMAL
SPECIMEN SOURCE: NORMAL

## 2018-05-01 DIAGNOSIS — E78.2 MIXED HYPERLIPIDEMIA: ICD-10-CM

## 2018-05-01 DIAGNOSIS — E03.9 ACQUIRED HYPOTHYROIDISM: ICD-10-CM

## 2018-05-01 RX ORDER — ROSUVASTATIN CALCIUM 5 MG/1
5 TABLET, COATED ORAL DAILY
Qty: 90 TABLET | Refills: 1 | Status: SHIPPED | OUTPATIENT
Start: 2018-05-01 | End: 2019-02-03

## 2018-05-01 RX ORDER — LEVOTHYROXINE SODIUM 88 UG/1
88 TABLET ORAL DAILY
Qty: 90 TABLET | Refills: 1 | Status: SHIPPED | OUTPATIENT
Start: 2018-05-01 | End: 2019-02-21

## 2018-06-09 ENCOUNTER — HEALTH MAINTENANCE LETTER (OUTPATIENT)
Age: 63
End: 2018-06-09

## 2018-08-16 DIAGNOSIS — E03.9 ACQUIRED HYPOTHYROIDISM: ICD-10-CM

## 2018-08-16 RX ORDER — LEVOTHYROXINE SODIUM 88 UG/1
88 TABLET ORAL DAILY
Qty: 90 TABLET | Refills: 1 | OUTPATIENT
Start: 2018-08-16

## 2019-02-01 ENCOUNTER — TELEPHONE (OUTPATIENT)
Dept: FAMILY MEDICINE | Facility: CLINIC | Age: 64
End: 2019-02-01

## 2019-02-01 DIAGNOSIS — E78.2 MIXED HYPERLIPIDEMIA: ICD-10-CM

## 2019-02-03 RX ORDER — ROSUVASTATIN CALCIUM 5 MG/1
5 TABLET, COATED ORAL DAILY
Qty: 90 TABLET | Refills: 0 | Status: SHIPPED | OUTPATIENT
Start: 2019-02-03 | End: 2019-07-11

## 2019-02-03 NOTE — TELEPHONE ENCOUNTER
rosuvastatin (CRESTOR) 5 MG tablet     Last Written Prescription Date:  5/1/18  Last Fill Quantity: 90,   # refills: 1  Last Office Visit : 1/3/18  Future Office visit:  None    Scheduling has been notified to contact the pt for appointment.    90 day to pharmacy    Routing   Because:  LDL

## 2019-02-19 ENCOUNTER — TELEPHONE (OUTPATIENT)
Dept: FAMILY MEDICINE | Facility: CLINIC | Age: 64
End: 2019-02-19

## 2019-02-19 DIAGNOSIS — E03.9 ACQUIRED HYPOTHYROIDISM: ICD-10-CM

## 2019-02-19 NOTE — TELEPHONE ENCOUNTER
Prior Authorization Retail Medication Request    Medication/Dose: Levothyroxine 0.088 mg  ICD code (if different than what is on RX):  E03.9  Previously Tried and Failed:  unknown  Rationale:  none    Insurance Name:  [facility]  Insurance ID:  [id number]

## 2019-02-21 RX ORDER — LEVOTHYROXINE SODIUM 88 UG/1
88 TABLET ORAL DAILY
Qty: 90 TABLET | Refills: 3 | Status: SHIPPED | OUTPATIENT
Start: 2019-02-21 | End: 2020-07-20

## 2019-02-21 NOTE — TELEPHONE ENCOUNTER
Rx was sent to pharmacy with refills, patient was called and notified.    Marilu Waldrop RN on 2/21/2019 at 4:11 PM

## 2019-02-21 NOTE — TELEPHONE ENCOUNTER
Central Prior Authorization Team   Phone: [phone]      PA NOT NEEDED-PATIENT REQUIRES REFILLS OF MEDICATION    Medication: Levothyroxine 0.088 mg-PATIENT NEEDS REFILLS  Insurance Company:    Pharmacy Filling the Rx:    Filling Pharmacy Phone:    Filling Pharmacy Fax:    Start Date: 2/21/2019        Patient calling to check on status of medication.  Patient states that she does not need a PA she needs a refill request.  Please have the provider send a new prescription to the patient's pharmacy.

## 2019-02-21 NOTE — TELEPHONE ENCOUNTER
Health Call Center    Phone Message    May a detailed message be left on voicemail: yes    Reason for Call: Medication Refill Request    Has the patient contacted the pharmacy for the refill? Yes   Name of medication being requested: levothyroxine (SYNTHROID/LEVOTHROID) 88 MCG tablet  Provider who prescribed the medication: Coleen Smith  Pharmacy: [facility] [address] [place] [address] AVE AT [facility] [address]  Date medication is needed: ASAP, IMPORTANT ::: This patient has been trying to get this medication refilled for some time now and she is almost out. She stated she has 3 days left and the pharmacy stated she needed to call the clinic, pharmacy stated that she needed a PA and they sent a request over and it was rejected with notes stating that a PA isn't needed. Patient would like a status update once this request has been completed. Please call Elizabeth.         Action Taken: Message routed to:  [facility] ([facility] Med Refill

## 2019-05-21 ENCOUNTER — MYC MEDICAL ADVICE (OUTPATIENT)
Dept: FAMILY MEDICINE | Facility: CLINIC | Age: 64
End: 2019-05-21

## 2019-05-21 DIAGNOSIS — J04.0 LARYNGITIS: Primary | ICD-10-CM

## 2019-05-21 DIAGNOSIS — R07.0 THROAT PAIN: ICD-10-CM

## 2019-07-08 ENCOUNTER — TELEPHONE (OUTPATIENT)
Dept: FAMILY MEDICINE | Facility: CLINIC | Age: 64
End: 2019-07-08

## 2019-07-08 DIAGNOSIS — E78.2 MIXED HYPERLIPIDEMIA: ICD-10-CM

## 2019-07-11 RX ORDER — ROSUVASTATIN CALCIUM 5 MG/1
5 TABLET, COATED ORAL DAILY
Qty: 30 TABLET | Refills: 0 | Status: SHIPPED | OUTPATIENT
Start: 2019-07-11 | End: 2019-10-10

## 2019-07-11 NOTE — TELEPHONE ENCOUNTER
rosuvastatin (CRESTOR) 5 MG tablet  Last Written Prescription Date:  2/3/19  Last Fill Quantity: 90,   # refills: 0  Last Office Visit : 1/3/18  Future Office visit:  None    Scheduling has been notified to contact the pt for appointment.    Routing refill request to provider for review/approval because: LDL,

## 2019-07-15 NOTE — TELEPHONE ENCOUNTER
Tried calling patient to schedule annual visit with Dr. Smith. No answered. Left message with PCC number requesting patient to call back to schedule appointment.

## 2019-08-14 ENCOUNTER — OFFICE VISIT (OUTPATIENT)
Dept: FAMILY MEDICINE | Facility: CLINIC | Age: 64
End: 2019-08-14
Payer: COMMERCIAL

## 2019-08-14 VITALS
SYSTOLIC BLOOD PRESSURE: 123 MMHG | HEART RATE: 63 BPM | WEIGHT: 247.7 LBS | RESPIRATION RATE: 16 BRPM | BODY MASS INDEX: 36.69 KG/M2 | DIASTOLIC BLOOD PRESSURE: 78 MMHG | HEIGHT: 69 IN | OXYGEN SATURATION: 95 % | TEMPERATURE: 97.9 F

## 2019-08-14 DIAGNOSIS — Z00.00 ANNUAL PHYSICAL EXAM: Primary | ICD-10-CM

## 2019-08-14 DIAGNOSIS — E03.9 HYPOTHYROIDISM, UNSPECIFIED TYPE: ICD-10-CM

## 2019-08-14 DIAGNOSIS — R21 RASH AND NONSPECIFIC SKIN ERUPTION: ICD-10-CM

## 2019-08-14 DIAGNOSIS — M85.89 OSTEOPENIA OF MULTIPLE SITES: ICD-10-CM

## 2019-08-14 DIAGNOSIS — E78.5 HYPERLIPIDEMIA LDL GOAL <100: ICD-10-CM

## 2019-08-14 DIAGNOSIS — E66.09 CLASS 2 OBESITY DUE TO EXCESS CALORIES WITHOUT SERIOUS COMORBIDITY WITH BODY MASS INDEX (BMI) OF 36.0 TO 36.9 IN ADULT: ICD-10-CM

## 2019-08-14 DIAGNOSIS — Z12.31 ENCOUNTER FOR SCREENING MAMMOGRAM FOR BREAST CANCER: ICD-10-CM

## 2019-08-14 DIAGNOSIS — E66.812 CLASS 2 OBESITY DUE TO EXCESS CALORIES WITHOUT SERIOUS COMORBIDITY WITH BODY MASS INDEX (BMI) OF 36.0 TO 36.9 IN ADULT: ICD-10-CM

## 2019-08-14 LAB
ANION GAP SERPL CALCULATED.3IONS-SCNC: 3 MMOL/L (ref 3–14)
BUN SERPL-MCNC: 14 MG/DL (ref 7–30)
CALCIUM SERPL-MCNC: 9.8 MG/DL (ref 8.5–10.1)
CHLORIDE SERPL-SCNC: 105 MMOL/L (ref 94–109)
CHOLEST SERPL-MCNC: 233 MG/DL
CO2 SERPL-SCNC: 31 MMOL/L (ref 20–32)
CREAT SERPL-MCNC: 0.9 MG/DL (ref 0.52–1.04)
GFR SERPL CREATININE-BSD FRML MDRD: 67 ML/MIN/{1.73_M2}
GLUCOSE SERPL-MCNC: 84 MG/DL (ref 70–99)
HDLC SERPL-MCNC: 69 MG/DL
LDLC SERPL CALC-MCNC: 142 MG/DL
NONHDLC SERPL-MCNC: 165 MG/DL
POTASSIUM SERPL-SCNC: 5 MMOL/L (ref 3.4–5.3)
SODIUM SERPL-SCNC: 139 MMOL/L (ref 133–144)
T3FREE SERPL-MCNC: 2.3 PG/ML (ref 2.3–4.2)
T4 FREE SERPL-MCNC: 0.91 NG/DL (ref 0.76–1.46)
TRIGL SERPL-MCNC: 112 MG/DL
TSH SERPL DL<=0.005 MIU/L-ACNC: 15.25 MU/L (ref 0.4–4)

## 2019-08-14 SDOH — HEALTH STABILITY: MENTAL HEALTH
STRESS IS WHEN SOMEONE FEELS TENSE, NERVOUS, ANXIOUS, OR CAN'T SLEEP AT NIGHT BECAUSE THEIR MIND IS TROUBLED. HOW STRESSED ARE YOU?: ONLY A LITTLE

## 2019-08-14 SDOH — HEALTH STABILITY: PHYSICAL HEALTH: ON AVERAGE, HOW MANY MINUTES DO YOU ENGAGE IN EXERCISE AT THIS LEVEL?: 40 MIN

## 2019-08-14 SDOH — HEALTH STABILITY: PHYSICAL HEALTH: ON AVERAGE, HOW MANY DAYS PER WEEK DO YOU ENGAGE IN MODERATE TO STRENUOUS EXERCISE (LIKE A BRISK WALK)?: 7 DAYS

## 2019-08-14 ASSESSMENT — MIFFLIN-ST. JEOR: SCORE: 1737.94

## 2019-08-14 ASSESSMENT — PAIN SCALES - GENERAL: PAINLEVEL: NO PAIN (0)

## 2019-08-14 NOTE — PROGRESS NOTES
[facility]  Primary Care Center   Coleen Smith MD PhD  08/14/2019      Chief Complaint:   Physical; Shingles; Derm Problem; and Thyroid Problem       History of Present Illness:   Elizabeth Treviño is a 64 year old female PM and Gr0P0 with a history of osteopenia and hypothyroidism who presents for a physical. No vaginal bleeding, discharge. No history of abnormal pap.    Last DEXA was in 2017 showing osteopenia. Most negative T-score -1.5. She is taking Calcium 600 mg twice daily and a vitamin D 1000 unit(s) supplement. She is also eating cottage cheese, milk, and cheese. She walks 40 minutes per day.    There is  pigmentation on her cheeks. She is not concerned about this.    Patient is hypothyroid and currently on levothyroxine 88 mcg a day.  She is wondering about her weight gain and if this relates to her thyroid.  Her sister also has a history of Hashimoto's.    She has sharp abdominal pain that onsets when she twists. If she stretches it out she can resolve the pain in 1 minute.  She has had this pain intermittently for several months.  No numbness in any part of her extremities.    Healthcare maintenance topics:  Mammogram (females age 40 - 75): 2017 yearly or every 2 years? - Recommended  Pap (females age 21 - 65): every 3 years, every 5 years after age 35 if cotesting done - Recommended  due  Colon Cancer screening (age 50 - 75): yearly FIT or colonoscopy every 5 - 10 years - Up to Date due in 2025  Dexa (females age ? 65, males age ? 70): every 2 years - plan for 12/2020 or 1/2021  Glucose: every 5 years, yearly if risk factors? - Recommended  2017  = 103  Lipid panel: every 5 years, yearly if risk factors - Recommended  2017  LDL =77  On a statin   Hepatitis C (adults born 1945 - 1965): once per lifetime - negative in 2016  Immunizations (Tetanus every 10 years, Influenza yearly, Pneumonia PPV-23 and PCV-13 age ? 65 or sooner if risk factors) - due for dose 2 of Shingrix   Immunization History    Administered Date(s) Administered     FLU 6-35 months 11/29/2017     Flu, Unspecified 10/15/2012     HEPA 11/05/2010     HepA-Adult 03/10/2016     Influenza (IIV3) PF 11/09/2007, 10/29/2011, 09/26/2012, 10/30/2013, 12/19/2014     TD (ADULT, 7+) 11/05/2010     TDAP Vaccine (Boostrix) 08/05/2015     Zoster vaccine, live 03/10/2016      The following health maintenance issues were also reviewed and addressed as needed:  Blood pressure (>18 years annually)  Depression - PHQ-2 Score:   PHQ-2 ( 1999 Pf[employer]) 8/14/2019 7/23/2014   Q1: Little interest or pleasure in doing things 0 0   Q2: Feeling down, depressed or hopeless 0 0   PHQ-2 Score 0 0   Q1: Little interest or pleasure in doing things Not at all -   Q2: Feeling down, depressed or hopeless Not at all -   PHQ-2 Score 0 -   Lifestyle habits (including exercise, diet, weight)  Health risk behaviors (sexual history, alcohol, tobacco, drug use, partner violence)    Review of Systems:   Answers for HPI/ROS submitted by the patient on 8/14/2019   General Symptoms: No  Skin Symptoms: pigmentation on face   HENT Symptoms: No  EYE SYMPTOMS: No  HEART SYMPTOMS: No  LUNG SYMPTOMS: No  INTESTINAL SYMPTOMS: No  URINARY SYMPTOMS: No  GYNECOLOGIC SYMPTOMS: No  BREAST SYMPTOMS: No  SKELETAL SYMPTOMS: No  BLOOD SYMPTOMS: No  NERVOUS SYSTEM SYMPTOMS: No  MENTAL HEALTH SYMPTOMS: No      Active Medications:     Current Outpatient Medications:      B Complex Vitamins (B COMPLEX 1 PO), Take 1 tablet by mouth daily , Disp: , Rfl:      Calcium Carbonate-Vitamin D (CALCIUM + D PO), Take 2 tablets by mouth every evening 500 mg calcium , Disp: , Rfl:      Cholecalciferol (VITAMIN D-3 PO), Take 1,000 Int'l Units by mouth every evening , Disp: , Rfl:      levothyroxine (SYNTHROID/LEVOTHROID) 88 MCG tablet, Take 1 tablet (88 mcg) by mouth daily, Disp: 90 tablet, Rfl: 3     rosuvastatin (CRESTOR) 5 MG tablet, Take 1 tablet (5 mg) by mouth daily, Disp: 30 tablet, Rfl: 0     VALTREX 500 MG tablet,  "Take 1 tablet (500 mg) by mouth 2 times daily, Disp: 30 tablet, Rfl: 3      Allergies:   Patient has no known allergies.      Past Medical History:  Hypothyroidism  Early menopause  Endometriosis  Osteopenia   PSVT (paroxysmal supraventricular tachycardia)      Past Surgical History:  Breast surgery  Colonoscopy  Ovary removed    Family History:   Mother: hypertension, cerebrovascular disease, macular degeneration  Sister: hypertension  Father: lung cancer, COPD  Brother: lung cancer  Sister: thyroid disease     Social History:     Non smoker    Physical Exam:   /78 (BP Location: Right arm, Patient Position: Sitting, Cuff Size: Adult Large)   Pulse 63   Temp 97.9  F (36.6  C) (Oral)   Resp 16   Ht 1.753 m (5' 9\")   Wt 112.4 kg (247 lb 11.2 oz)   SpO2 95%   BMI 36.58 kg/m     Gen:  64 year old female in NAD  HEENT: PERRL fundi normal  Ears clear with good light reflex.  OP MMM no lesions.  No drainage  Neck  Supple.  Thyroid  normal  No carotid bruits.  No LAD  CVS exam: S1, S2 normal, no murmur, click, rub or gallop, regular rate and rhythm, chest is clear without rales or wheezing, no pedal edema, no JVD, no hepatosplenomegaly.  Respiratory: Normal - Clear to auscultation without rales, rhonchi, or wheezing.  BREAST:  normal without suspicious masses, skin changes or axillary nodes, symmetric fibrous changes in both upper outer quadrants, unchanged from previous exams   Pelvic exam: creamy discharge. Could not visualize cervix, exam limiteed by body habitus. normal vagina and vulva, pap smear done.  Skin: scattered irregular light brown pigmentation on malar surface of face  Rectal exam: negative  Abd Soft ND NT  BS normal  No masses or HSM  Ext   Good pulses. No edema  Musculoskeletal: spine is straight, extremities full ROM, no deformity  Neuro: Neurological exam reveals normal without focal findings, mental status, speech normal, alert and oriented x iii, [place], reflexes normal and " symmetric.     Assessment and Plan:  Elizabeth Treviño is a 64 year old female PM and Gr0P0 with a history of osteopenia and hypothyroidism who presents for a physical.    (Z00.00) Annual physical exam  (primary encounter diagnosis)  Comment: health maintenance updated. shingrix due, pap due, mammogram due colonoscopy in 2025  Plan: Pap imaged thin layer screen with HPV -         recommended age 30 - 65 years (select HPV order        below), HPV High Risk Types DNA Cervical          (Z12.31) Encounter for screening mammogram for breast cancer  Comment: routine screening  Plan: Mammogram, routine screening          (E03.9) Hypothyroidism, unspecified type  Comment: Pt is on supplement - concern for weight gain  will recheck TFTs, she was interested in Cytomel/T3 replacement. Will make medication changes if indicated by labs.  Plan: TSH, T4 free, T3 Free          (E78.5) Hyperlipidemia LDL goal <100  Comment:on a statin and last LDl in 2017 was 77   Plan: Lipid Profile FASTING          (E66.09,  Z68.36) Class 2 obesity due to excess calories without serious comorbidity with body mass index (BMI) of 36.0 to 36.9 in adult  Comment: will check fasting sugar. We can consider a weight management referral in the future, however she may try [facility] program first.  Plan: Basic metabolic panel          (M85.89) Osteopenia of multiple sites  Comment: 2017 DEXA showed low bone density.   Plan: continue with dietary calcium and supplements along with vitamin D. DXA in Dec 2020     (R21) Rash and nonspecific skin eruption  Comment: Melasma  Plan: monitor.      Follow-up: 6 months          Scribe Disclosure:  I, Tyson Rogers, am serving as a scribe to document services personally performed by Coleen Smith MD PhD at this visit, based upon the provider's statements to me. All documentation has been reviewed by the aforementioned provider prior to being entered into the official medical record.    Portions of this  medical record were completed by a scribe. UPON MY REVIEW AND AUTHENTICATION BY ELECTRONIC SIGNATURE, this confirms (a) I performed the applicable clinical services, and (b) the record is accurate.

## 2019-08-14 NOTE — NURSING NOTE
Chief Complaint   Patient presents with     Physical     Shingles     Vaccine #2     Derm Problem     Discoloration on face     Thyroid Problem       Belén Kumar, EMT

## 2019-08-14 NOTE — PATIENT INSTRUCTIONS
Get Shingrix - 2 shots - at pharmacy   Blood work today  Mammogram  DXA in 12/ 2020  Colonoscopy in 2025    Nutritious diet  Eat 1200 mg calcium total every day.  Many people achieve this by a diet containing calcium (milk, yogurt, cheese, and other dairy products, supplemented food) and 1 calcium pill. If you don't eat dairy, you should take a calcium supplement 2 times a day.  1000 IU of vitamin D on daily basis.    Eat a variety of fruits and vegetables and eat whole grains.  Try to choose foods with a high NuVal score, if your grocery store shows this number. High numbers, like 80 or 90, are nutritious foods, and low scores, like 10 are less nutritious foods.          Men should drink no more than 2 alcoholic beverages daily on average; women should drink no more than 1 alcoholic beverage daily on average.    Everyone should avoid all tobacco and nicotine-containing products.    Exercise: Aim for:  Moderate activity (where you can still talk while doing it, such as walking about 100 steps per minute, or 3,000 steps in 30 minutes) for 30 minutes at least 5 days a week  Or  Vigorous exercise (you are working so hard you are breathing hard and fast and your heart rate has gone up, such as playing basketball or soccer) at least 75 minutes weekly    If you have trouble doing 30 minutes of activity, all at once, try small bursts of activity. Go to www.abefitness.com for suggestions on how you can do this at home or work.     All adults should try to do muscle strengthening exercise at least 2 days a week    Safety  Always wear bike/motorcycle helmet and seatbelt in a vehicle  Make sure your home has smoke and carbon monoxide detectors.  Wear broad spectrum sunscreen of at least SPF 15 on sun-exposed skin.    Preventing disease  See your dentist at least once a year  Have your eyes checked every 1-2 years.  Get a flu shot each year.

## 2019-08-15 ENCOUNTER — MYC MEDICAL ADVICE (OUTPATIENT)
Dept: FAMILY MEDICINE | Facility: CLINIC | Age: 64
End: 2019-08-15

## 2019-08-16 DIAGNOSIS — E03.9 ACQUIRED HYPOTHYROIDISM: Primary | ICD-10-CM

## 2019-08-16 LAB
COPATH REPORT: NORMAL
PAP: NORMAL

## 2019-08-16 NOTE — PROGRESS NOTES
8-16-19  Phone call pt has high TSH - taking 88mcg/day levo.  Wants to repeat in 2 wks before adjusting med. Lipid is up alsobut not taking crestor consistently   Coleen Smith MD, PhD

## 2019-08-20 LAB
FINAL DIAGNOSIS: NORMAL
HPV HR 12 DNA CVX QL NAA+PROBE: NEGATIVE
HPV16 DNA SPEC QL NAA+PROBE: NEGATIVE
HPV18 DNA SPEC QL NAA+PROBE: NEGATIVE
SPECIMEN DESCRIPTION: NORMAL
SPECIMEN SOURCE CVX/VAG CYTO: NORMAL

## 2019-08-27 ENCOUNTER — MYC MEDICAL ADVICE (OUTPATIENT)
Dept: FAMILY MEDICINE | Facility: CLINIC | Age: 64
End: 2019-08-27

## 2019-09-01 DIAGNOSIS — E03.9 ACQUIRED HYPOTHYROIDISM: Primary | ICD-10-CM

## 2019-09-03 DIAGNOSIS — E03.9 ACQUIRED HYPOTHYROIDISM: ICD-10-CM

## 2019-09-03 LAB
T3FREE SERPL-MCNC: 2.2 PG/ML (ref 2.3–4.2)
T4 FREE SERPL-MCNC: 0.81 NG/DL (ref 0.76–1.46)
TSH SERPL DL<=0.005 MIU/L-ACNC: 12.76 MU/L (ref 0.4–4)

## 2019-09-03 PROCEDURE — 84481 FREE ASSAY (FT-3): CPT | Performed by: FAMILY MEDICINE

## 2019-09-03 PROCEDURE — 36415 COLL VENOUS BLD VENIPUNCTURE: CPT | Performed by: FAMILY MEDICINE

## 2019-09-03 PROCEDURE — 84439 ASSAY OF FREE THYROXINE: CPT | Performed by: FAMILY MEDICINE

## 2019-09-03 PROCEDURE — 84443 ASSAY THYROID STIM HORMONE: CPT | Performed by: FAMILY MEDICINE

## 2019-09-04 ENCOUNTER — MYC MEDICAL ADVICE (OUTPATIENT)
Dept: FAMILY MEDICINE | Facility: CLINIC | Age: 64
End: 2019-09-04

## 2019-09-06 ENCOUNTER — MYC MEDICAL ADVICE (OUTPATIENT)
Dept: FAMILY MEDICINE | Facility: CLINIC | Age: 64
End: 2019-09-06

## 2019-09-06 NOTE — TELEPHONE ENCOUNTER
Replied to the pt via [facility] . Routed to MD with question Nikki Andrea Paramedic on 9/6/2019 at 4:42 PM

## 2019-09-10 DIAGNOSIS — E03.9 ACQUIRED HYPOTHYROIDISM: Primary | ICD-10-CM

## 2019-09-11 ENCOUNTER — TELEPHONE (OUTPATIENT)
Dept: ENDOCRINOLOGY | Facility: CLINIC | Age: 64
End: 2019-09-11

## 2019-09-11 NOTE — TELEPHONE ENCOUNTER
Replied to the pt [facility]. Nikki Andrea Paramedic on 9/11/2019 at 8:34 AM

## 2019-09-11 NOTE — TELEPHONE ENCOUNTER
To schedulers: Please schedule  for lst available endocrine within 28 days.     Dalia Goodman MD  Endocrine triage        [facility] Call Center    Phone Message    May a detailed message be left on voicemail: yes    Reason for Call: Other: Pt referred by Coleen Smith for acquired hypothyroidism. Please call back to schedule.     Action Taken: Message routed to:  [facility] endo

## 2019-09-29 ENCOUNTER — HEALTH MAINTENANCE LETTER (OUTPATIENT)
Age: 64
End: 2019-09-29

## 2019-10-01 NOTE — TELEPHONE ENCOUNTER
RECORDS RECEIVED FROM: internal   DATE RECEIVED: 10/30/2019   NOTES (FOR ALL VISITS) STATUS DETAILS   OFFICE NOTES from referring provider Internal 09/11/2019   OFFICE NOTES from other specialist Internal 09/10/2019   ED NOTES N/A    OPERATIVE REPORT  (thyroid, pituitary, adrenal, parathyroid) N/A    MEDICATION LIST Internal LEVOTHYROXINE   IMAGING      DEXASCAN Internal 12/22/2017   MRI (BRAIN) NA    XR (Chest) INTERNAL 12/15/2017   CT (HEAD/NECK/CHEST/ABDOMEN) N/A    NUCLEAR  N/A    ULTRASOUND (HEAD/NECK) N/A    LABS     DIABETES: HBGA1C, CREATININE, FASTING LIPIDS, MICROALBUMIN URINE, POTASSIUM, TSH, T4    THYROID: TSH, T4, CBC, THYRODLONULIN, TOTAL T3, FREE T4, CALCITONIN, CEA Internal   09/03/2019 08/14/2019 08/05/2017

## 2019-10-09 DIAGNOSIS — E78.2 MIXED HYPERLIPIDEMIA: ICD-10-CM

## 2019-10-10 RX ORDER — ROSUVASTATIN CALCIUM 5 MG/1
5 TABLET, COATED ORAL DAILY
Qty: 90 TABLET | Refills: 0 | Status: SHIPPED | OUTPATIENT
Start: 2019-10-10 | End: 2020-01-27

## 2019-10-30 ENCOUNTER — OFFICE VISIT (OUTPATIENT)
Dept: ENDOCRINOLOGY | Facility: CLINIC | Age: 64
End: 2019-10-30
Attending: FAMILY MEDICINE
Payer: COMMERCIAL

## 2019-10-30 ENCOUNTER — PRE VISIT (OUTPATIENT)
Dept: ENDOCRINOLOGY | Facility: CLINIC | Age: 64
End: 2019-10-30

## 2019-10-30 VITALS
SYSTOLIC BLOOD PRESSURE: 133 MMHG | DIASTOLIC BLOOD PRESSURE: 83 MMHG | WEIGHT: 248.3 LBS | HEIGHT: 69 IN | HEART RATE: 75 BPM | BODY MASS INDEX: 36.78 KG/M2

## 2019-10-30 DIAGNOSIS — E03.9 ACQUIRED HYPOTHYROIDISM: ICD-10-CM

## 2019-10-30 LAB
T4 FREE SERPL-MCNC: 0.84 NG/DL (ref 0.76–1.46)
TSH SERPL DL<=0.005 MIU/L-ACNC: 15.9 MU/L (ref 0.4–4)

## 2019-10-30 PROCEDURE — 84443 ASSAY THYROID STIM HORMONE: CPT | Performed by: INTERNAL MEDICINE

## 2019-10-30 PROCEDURE — 84439 ASSAY OF FREE THYROXINE: CPT | Performed by: INTERNAL MEDICINE

## 2019-10-30 PROCEDURE — 36415 COLL VENOUS BLD VENIPUNCTURE: CPT | Performed by: INTERNAL MEDICINE

## 2019-10-30 ASSESSMENT — PAIN SCALES - GENERAL: PAINLEVEL: NO PAIN (0)

## 2019-10-30 ASSESSMENT — MIFFLIN-ST. JEOR: SCORE: 1740.66

## 2019-10-30 NOTE — NURSING NOTE
Chief Complaint   Patient presents with     New Patient     hypothyroidism      Fidelina Hernandez CMA

## 2019-10-30 NOTE — LETTER
10/30/2019       RE: Elizabeth Treviño  [address]  [place] [address]     Dear Colleague,    Thank you for referring your patient, Elizabeth Treviño, to the [facility] ENDOCRINOLOGY at [facility]. Please see a copy of my visit note below.    Endocrinology Clinic Visit 10/30/2019  NAME:  Elizabeth Treviño  PCP:  Coleen Smith  MRN:  [id number]  Reason for Consult:  Hypothyroidism  Requesting Provider:  Coleen Smith    Chief Complaint     Chief Complaint   Patient presents with     New Patient     hypothyroidism        History of Present Illness     Elizabeth Treviño is a 64 year old female who is seen in clinic for management of hypothyroidism.    She was diagnosed with hypothyroidism in her mid-40s. It was on a routine examination.   Has been on levothyroxine since. It has been always been generic levothyroxine. She takes it on an empty stomach every morning. Rarely ever misses it, but more recently, she was missing her doses more frequently, due to busy at work and travel.     08/2017: TSH 3.3  8/14/2019: TSH was 15.25  9/3/2019: repeat TSH was 12.76, free T4 0.81 (normal), free T3 2.2 (low).     Patient is asymptomatic. Specifically:   No fatigue  No cold intolerance.   More hair loss  No constipation  No changes in sleep    No recent history of febrile illness with neck pain or sore throat.     Localized symptoms: there is no throat swelling, trouble swallowing, no SOB when lying flat, and no voice changes.     Family history of thyroid disease: Yes: mother and 2 sisters, nephews with hypothyroidism  Family history of thyroid cancer: No  Personal history of high-dose radiation especially in childhood: No    Problem List     Patient Active Problem List   Diagnosis     Acquired hypothyroidism     Easy bruising     Osteoporosis     PSVT (paroxysmal supraventricular tachycardia) (H)     Abnormal weight gain     Pigmented skin lesion     SK (seborrheic keratosis)     Seborrheic  dermatitis        Medications     Current Outpatient Medications   Medication     B Complex Vitamins (B COMPLEX 1 PO)     Calcium Carbonate-Vitamin D (CALCIUM + D PO)     Cholecalciferol (VITAMIN D-3 PO)     levothyroxine (SYNTHROID/LEVOTHROID) 88 MCG tablet     rosuvastatin (CRESTOR) 5 MG tablet     VALTREX 500 MG tablet     No current facility-administered medications for this visit.         Allergies     No Known Allergies    Medical / Surgical History     Past Medical History:   Diagnosis Date     Acquired hypothyroidism 9/26/2012     Problem list name updated by automated process. Provider to review     Early menopause     ovarian failure at age 36      Endometriosis      Osteopenia      PSVT (paroxysmal supraventricular tachycardia) (H) 9/26/2012     Past Surgical History:   Procedure Laterality Date     BREAST SURGERY Left 1981    benign cyst      COLONOSCOPY  2015    repeat in 10 yrs      ovary removed Left 1986    partial removal of right ovary 1990       Social History     Social History     Socioeconomic History     Marital status:      Spouse name: Not on file     Number of children: Not on file     Years of education: Not on file     Highest education level: Not on file   Occupational History     Occupation: executive     Employer: [employer]   Social Needs     Financial resource strain: Not on file     Food insecurity:     Worry: Not on file     Inability: Not on file     Transportation needs:     Medical: Not on file     Non-medical: Not on file   Tobacco Use     Smoking status: Never Smoker     Smokeless tobacco: Never Used   Substance and Sexual Activity     Alcohol use: No     Drug use: No     Sexual activity: Not Currently     Partners: Male     Birth control/protection: Post-menopausal   Lifestyle     Physical activity:     Days per week: 7 days     Minutes per session: 40 min     Stress: Only a little   Relationships     Social connections:     Talks on phone: Not on file     Gets  together: Not on file     Attends Cheondoism service: Not on file     Active member of club or organization: Not on file     Attends meetings of clubs or organizations: Not on file     Relationship status: Not on file     Intimate partner violence:     Fear of current or ex partner: Not on file     Emotionally abused: Not on file     Physically abused: Not on file     Forced sexual activity: Not on file   Other Topics Concern     Parent/sibling w/ CABG, MI or angioplasty before 65F 55M? Not Asked   Social History Narrative    , adopted son,  is retired - working full time        Family History     Family History   Problem Relation Age of Onset     Hypertension Mother      Cerebrovascular Disease Mother      Macular Degeneration Mother      Hypertension Sister      Cancer Father         Lung cancer     Respiratory Father         COPD     Cancer Brother         Lung cancer     Thyroid Disease Sister      Thyroid Disease Sister      Amblyopia No family hx of      Retinal detachment No family hx of      Glaucoma No family hx of        ROS     Constitutional: no fevers, chills, night sweats. No weight loss/gain. No fatigue. Good appetite  Eyes: no vision changes, no eye redness, no diplopia  Ears, Nose, mouth, throat: no hearing changes, no tinnitus, no rhinorrhea, no nasal congestion, no anosmia  Cardiovascular: no chest pain, no orthopnea or PND, no edema, no palpitations  Respiratory: no dyspnea, no cough, no sputum, no wheezing  Gastrointestinal: no nausea, no vomiting, no abdominal pain, no diarrhea, no constipation  Genitourinary: no dysuria, no frequency, no urgency, no nocturia  Musculoskeletal: no joint pains, no back pain, no cramps, no fractures  Skin: no rash, no itching, no dryness, no ulcers, no hair loss, no nail changes  Neurological:no headaches, no weakness, no numbness, no tingling, no tremors, no difficulty sleeping, no snoring, no AM sleepiness  Psychiatric: no anxiety, no  "sadness  Hematologic/lymphatic: no easy bruising, no bleeding, no palor    Physical Exam   /83   Pulse 75   Ht 1.753 m (5' 9\")   Wt 112.6 kg (248 lb 4.8 oz)   BMI 36.67 kg/m        General: Comfortable, no obvious distress, normal body habitus  Eyes: Sclera anicteric, moist conjunctiva, no lid lag, no exophthalmos  HENT: Atraumatic, oropharynx clear, moist mucous membranes with no mucosal ulcerations  Neck: Trachea midline, supple. Thyroid: Thyroid is normal in size and texture  CV: Regular rhythm, normal rate. No murmurs auscultated  Resp: Clear to auscultation bilaterally, good effort  Abdomen:  Soft, non tender, non distended. Bowel sounds heard. No organomegaly. No striae  Skin: No rashes, lesions, or subcutaneous nodules.   Psych: Alert and oriented x 3. Appropriate affect, good insight  Extremities: No peripheral edema  Musculoskeletal: Appropriate muscle bulk and strength  Lymphatic: No cervical lymphadenopathy  Neuro: Moves all four extremities. No focal deficits on limited exam. Gait normal. No resting tremor, deep tendon reflexes with normal relaxation phase.     Labs/Imaging     Pertinent Labs were reviewed and updated in [employer].  Radiology Results were  reviewed and updated in EPIC.    Summary of recent findings:     TSH   Date Value Ref Range Status   09/03/2019 12.76 (H) 0.40 - 4.00 mU/L Final   08/14/2019 15.25 (H) 0.40 - 4.00 mU/L Final   08/04/2017 3.36 0.40 - 4.00 mU/L Final   06/10/2016 1.92 0.40 - 4.00 mU/L Final   11/24/2015 1.74 0.40 - 4.00 mU/L Final     T4 Free   Date Value Ref Range Status   09/03/2019 0.81 0.76 - 1.46 ng/dL Final   08/14/2019 0.91 0.76 - 1.46 ng/dL Final   08/05/2015 1.09 0.76 - 1.46 ng/dL Final   02/03/2010 1.10 0.70 - 1.85 ng/dL Final     Comment:     Ordered by lab   08/19/2009 0.66 (L) 0.70 - 1.85 ng/dL Final     Comment:     Ordered by lab       Impression / Plan     1. Hypothyroidism:   We discussed the pathogenesis of hypothyroidism. In most cases it is an " autoimmune process that results in lymphocytic thyroiditis, also knows as Hashimoto's thyroiditis, which gradually destroys the gland's ability to produce and secrete thyroid hormone. There is a genetic predisposition in most cases, and possibly an environmental trigger. There is no treatment that would reverse/stop the autoimmune process. Treatment is aimed at replacing thyroid hormone, with levothyroxine, which is identical to our own T4. The dose is adjusted to target a normal TSH, which is a signal from the pituitary gland.   Patients with hypothyroidism usually have positive thyroid antibodies, namely thyroid peroxidase antibody (anti-TPO) and anti-thyroglobulin antibody.     As far as her management, she has had a rise in TSH in the past 2 years.     Factors that might be contributing:   - missed doses of levothyroxine  - change in generic supply/course  - weight gain (hers has been 4 lbs in past 2 years)  - fluctuating thyroid status (with Hashimoto's)  - a consistent rise could indicating worsening underlying thyroid function    I also explained that free T3 is not as reliable of a test as TSH.     Plan:   - check TSH again today  - if above normal, would recommend an increase in the dose (such as to 100 mcg) but will wait until lab is back before final rec.     Follow up:   Patient would like to follow with PCP unless there is constant fluctuations in the future    Chandrakant Booker MD  Endocrinology, Diabetes and Metabolism  [facility]

## 2019-10-30 NOTE — PROGRESS NOTES
Endocrinology Clinic Visit 10/30/2019  NAME:  Elizabeth Treviño  PCP:  Luis Coleenjaylen Sims  MRN:  [id number]  Reason for Consult:  Hypothyroidism  Requesting Provider:  Coleen Smith    Chief Complaint     Chief Complaint   Patient presents with     New Patient     hypothyroidism        History of Present Illness     Elizabeth Treviño is a 64 year old female who is seen in clinic for management of hypothyroidism.    She was diagnosed with hypothyroidism in her mid-40s. It was on a routine examination.   Has been on levothyroxine since. It has been always been generic levothyroxine. She takes it on an empty stomach every morning. Rarely ever misses it, but more recently, she was missing her doses more frequently, due to busy at work and travel.     08/2017: TSH 3.3  8/14/2019: TSH was 15.25  9/3/2019: repeat TSH was 12.76, free T4 0.81 (normal), free T3 2.2 (low).     Patient is asymptomatic. Specifically:   No fatigue  No cold intolerance.   More hair loss  No constipation  No changes in sleep    No recent history of febrile illness with neck pain or sore throat.     Localized symptoms: there is no throat swelling, trouble swallowing, no SOB when lying flat, and no voice changes.     Family history of thyroid disease: Yes: mother and 2 sisters, nephews with hypothyroidism  Family history of thyroid cancer: No  Personal history of high-dose radiation especially in childhood: No    Problem List     Patient Active Problem List   Diagnosis     Acquired hypothyroidism     Easy bruising     Osteoporosis     PSVT (paroxysmal supraventricular tachycardia) (H)     Abnormal weight gain     Pigmented skin lesion     SK (seborrheic keratosis)     Seborrheic dermatitis        Medications     Current Outpatient Medications   Medication     B Complex Vitamins (B COMPLEX 1 PO)     Calcium Carbonate-Vitamin D (CALCIUM + D PO)     Cholecalciferol (VITAMIN D-3 PO)     levothyroxine (SYNTHROID/LEVOTHROID) 88 MCG tablet     rosuvastatin  (CRESTOR) 5 MG tablet     VALTREX 500 MG tablet     No current facility-administered medications for this visit.         Allergies     No Known Allergies    Medical / Surgical History     Past Medical History:   Diagnosis Date     Acquired hypothyroidism 9/26/2012     Problem list name updated by automated process. Provider to review     Early menopause     ovarian failure at age 36      Endometriosis      Osteopenia      PSVT (paroxysmal supraventricular tachycardia) (H) 9/26/2012     Past Surgical History:   Procedure Laterality Date     BREAST SURGERY Left 1981    benign cyst      COLONOSCOPY  2015    repeat in 10 yrs      ovary removed Left 1986    partial removal of right ovary 1990       Social History     Social History     Socioeconomic History     Marital status:      Spouse name: Not on file     Number of children: Not on file     Years of education: Not on file     Highest education level: Not on file   Occupational History     Occupation: executive     Employer: [employer]   Social Needs     Financial resource strain: Not on file     Food insecurity:     Worry: Not on file     Inability: Not on file     Transportation needs:     Medical: Not on file     Non-medical: Not on file   Tobacco Use     Smoking status: Never Smoker     Smokeless tobacco: Never Used   Substance and Sexual Activity     Alcohol use: No     Drug use: No     Sexual activity: Not Currently     Partners: Male     Birth control/protection: Post-menopausal   Lifestyle     Physical activity:     Days per week: 7 days     Minutes per session: 40 min     Stress: Only a little   Relationships     Social connections:     Talks on phone: Not on file     Gets together: Not on file     Attends Taoism service: Not on file     Active member of club or organization: Not on file     Attends meetings of clubs or organizations: Not on file     Relationship status: Not on file     Intimate partner violence:     Fear of current or ex partner:  "Not on file     Emotionally abused: Not on file     Physically abused: Not on file     Forced sexual activity: Not on file   Other Topics Concern     Parent/sibling w/ CABG, MI or angioplasty before 65F 55M? Not Asked   Social History Narrative    , adopted son,  is retired - working full time        Family History     Family History   Problem Relation Age of Onset     Hypertension Mother      Cerebrovascular Disease Mother      Macular Degeneration Mother      Hypertension Sister      Cancer Father         Lung cancer     Respiratory Father         COPD     Cancer Brother         Lung cancer     Thyroid Disease Sister      Thyroid Disease Sister      Amblyopia No family hx of      Retinal detachment No family hx of      Glaucoma No family hx of        ROS     Constitutional: no fevers, chills, night sweats. No weight loss/gain. No fatigue. Good appetite  Eyes: no vision changes, no eye redness, no diplopia  Ears, Nose, mouth, throat: no hearing changes, no tinnitus, no rhinorrhea, no nasal congestion, no anosmia  Cardiovascular: no chest pain, no orthopnea or PND, no edema, no palpitations  Respiratory: no dyspnea, no cough, no sputum, no wheezing  Gastrointestinal: no nausea, no vomiting, no abdominal pain, no diarrhea, no constipation  Genitourinary: no dysuria, no frequency, no urgency, no nocturia  Musculoskeletal: no joint pains, no back pain, no cramps, no fractures  Skin: no rash, no itching, no dryness, no ulcers, no hair loss, no nail changes  Neurological:no headaches, no weakness, no numbness, no tingling, no tremors, no difficulty sleeping, no snoring, no AM sleepiness  Psychiatric: no anxiety, no sadness  Hematologic/lymphatic: no easy bruising, no bleeding, no palor    Physical Exam   /83   Pulse 75   Ht 1.753 m (5' 9\")   Wt 112.6 kg (248 lb 4.8 oz)   BMI 36.67 kg/m       General: Comfortable, no obvious distress, normal body habitus  Eyes: Sclera anicteric, moist conjunctiva, " no lid lag, no exophthalmos  HENT: Atraumatic, oropharynx clear, moist mucous membranes with no mucosal ulcerations  Neck: Trachea midline, supple. Thyroid: Thyroid is normal in size and texture  CV: Regular rhythm, normal rate. No murmurs auscultated  Resp: Clear to auscultation bilaterally, good effort  Abdomen:  Soft, non tender, non distended. Bowel sounds heard. No organomegaly. No striae  Skin: No rashes, lesions, or subcutaneous nodules.   Psych: Alert and oriented x 3. Appropriate affect, good insight  Extremities: No peripheral edema  Musculoskeletal: Appropriate muscle bulk and strength  Lymphatic: No cervical lymphadenopathy  Neuro: Moves all four extremities. No focal deficits on limited exam. Gait normal. No resting tremor, deep tendon reflexes with normal relaxation phase.     Labs/Imaging     Pertinent Labs were reviewed and updated in [facility].  Radiology Results were  reviewed and updated in [facility]IC.    Summary of recent findings:     TSH   Date Value Ref Range Status   09/03/2019 12.76 (H) 0.40 - 4.00 mU/L Final   08/14/2019 15.25 (H) 0.40 - 4.00 mU/L Final   08/04/2017 3.36 0.40 - 4.00 mU/L Final   06/10/2016 1.92 0.40 - 4.00 mU/L Final   11/24/2015 1.74 0.40 - 4.00 mU/L Final     T4 Free   Date Value Ref Range Status   09/03/2019 0.81 0.76 - 1.46 ng/dL Final   08/14/2019 0.91 0.76 - 1.46 ng/dL Final   08/05/2015 1.09 0.76 - 1.46 ng/dL Final   02/03/2010 1.10 0.70 - 1.85 ng/dL Final     Comment:     Ordered by lab   08/19/2009 0.66 (L) 0.70 - 1.85 ng/dL Final     Comment:     Ordered by lab       Impression / Plan     1. Hypothyroidism:   We discussed the pathogenesis of hypothyroidism. In most cases it is an autoimmune process that results in lymphocytic thyroiditis, also knows as Hashimoto's thyroiditis, which gradually destroys the gland's ability to produce and secrete thyroid hormone. There is a genetic predisposition in most cases, and possibly an environmental trigger. There is no treatment that  would reverse/stop the autoimmune process. Treatment is aimed at replacing thyroid hormone, with levothyroxine, which is identical to our own T4. The dose is adjusted to target a normal TSH, which is a signal from the pituitary gland.   Patients with hypothyroidism usually have positive thyroid antibodies, namely thyroid peroxidase antibody (anti-TPO) and anti-thyroglobulin antibody.     As far as her management, she has had a rise in TSH in the past 2 years.     Factors that might be contributing:   - missed doses of levothyroxine  - change in generic supply/course  - weight gain (hers has been 4 lbs in past 2 years)  - fluctuating thyroid status (with Hashimoto's)  - a consistent rise could indicating worsening underlying thyroid function    I also explained that free T3 is not as reliable of a test as TSH.     Plan:   - check TSH again today  - if above normal, would recommend an increase in the dose (such as to 100 mcg) but will wait until lab is back before final rec.         Follow up:   Patient would like to follow with PCP unless there is constant fluctuations in the future      Chandrakant Booker MD  Endocrinology, Diabetes and Metabolism  [facility]

## 2019-10-31 ENCOUNTER — MYC MEDICAL ADVICE (OUTPATIENT)
Dept: FAMILY MEDICINE | Facility: CLINIC | Age: 64
End: 2019-10-31

## 2019-10-31 DIAGNOSIS — E03.9 ACQUIRED HYPOTHYROIDISM: Primary | ICD-10-CM

## 2019-11-01 RX ORDER — LEVOTHYROXINE SODIUM 100 UG/1
100 TABLET ORAL DAILY
Qty: 90 TABLET | Refills: 3 | Status: SHIPPED | OUTPATIENT
Start: 2019-11-01 | End: 2020-09-28

## 2019-11-01 NOTE — TELEPHONE ENCOUNTER
Route to Md Dano Andrea Paramedic on 11/1/2019 at 10:06 AM       11-1-19  ordered levothyroxine 100mcg/day and recheck TSH in 2 months.  Coleen Smith MD, PhD

## 2019-11-15 ENCOUNTER — HOSPITAL ENCOUNTER (EMERGENCY)
Facility: CLINIC | Age: 64
Discharge: HOME | End: 2019-11-15
Attending: PHYSICIAN ASSISTANT | Admitting: PHYSICIAN ASSISTANT
Payer: COMMERCIAL

## 2019-11-15 ENCOUNTER — APPOINTMENT (OUTPATIENT)
Dept: GENERAL RADIOLOGY | Facility: CLINIC | Age: 64
End: 2019-11-15
Attending: PHYSICIAN ASSISTANT
Payer: COMMERCIAL

## 2019-11-15 VITALS
OXYGEN SATURATION: 99 % | WEIGHT: 230 LBS | TEMPERATURE: 99.6 F | HEIGHT: 69 IN | DIASTOLIC BLOOD PRESSURE: 66 MMHG | BODY MASS INDEX: 34.07 KG/M2 | SYSTOLIC BLOOD PRESSURE: 133 MMHG

## 2019-11-15 DIAGNOSIS — J01.01 ACUTE RECURRENT MAXILLARY SINUSITIS: ICD-10-CM

## 2019-11-15 DIAGNOSIS — J06.9 VIRAL URI WITH COUGH: ICD-10-CM

## 2019-11-15 PROCEDURE — 71046 X-RAY EXAM CHEST 2 VIEWS: CPT

## 2019-11-15 PROCEDURE — 99214 OFFICE O/P EST MOD 30 MIN: CPT | Mod: Z6 | Performed by: PHYSICIAN ASSISTANT

## 2019-11-15 PROCEDURE — G0463 HOSPITAL OUTPT CLINIC VISIT: HCPCS | Mod: 25 | Performed by: PHYSICIAN ASSISTANT

## 2019-11-15 RX ORDER — CEFUROXIME AXETIL 500 MG/1
500 TABLET ORAL 2 TIMES DAILY
Qty: 20 TABLET | Refills: 0 | Status: SHIPPED | OUTPATIENT
Start: 2019-11-15 | End: 2019-11-21 | Stop reason: ALTCHOICE

## 2019-11-15 RX ORDER — PREDNISONE 20 MG/1
40 TABLET ORAL DAILY
Qty: 10 TABLET | Refills: 0 | Status: SHIPPED | OUTPATIENT
Start: 2019-11-15 | End: 2019-11-20

## 2019-11-15 RX ORDER — BENZONATATE 100 MG/1
100-200 CAPSULE ORAL 3 TIMES DAILY PRN
Qty: 42 CAPSULE | Refills: 0 | Status: SHIPPED | OUTPATIENT
Start: 2019-11-15 | End: 2019-11-21 | Stop reason: ALTCHOICE

## 2019-11-15 RX ORDER — ALBUTEROL SULFATE 90 UG/1
2 AEROSOL, METERED RESPIRATORY (INHALATION) EVERY 6 HOURS PRN
Qty: 18 G | Refills: 0 | Status: SHIPPED | OUTPATIENT
Start: 2019-11-15 | End: 2020-07-20

## 2019-11-15 ASSESSMENT — MIFFLIN-ST. JEOR: SCORE: 1657.65

## 2019-11-15 NOTE — ED PROVIDER NOTES
History     Chief Complaint   Patient presents with     Cough     for 3 days, fevers, son has pneumonia     HPI  Elizabeth Treviño is a 64 year old female who presents to the urgent care with concern over suspected sinus infection and pneumonia.  Patient reports that she has a cough for the last 3 days.  She initially complains of fever up to 101.7, chills, myalgias, headache, nasal congestion, postnasal drainage, shortness of breath and wheezing.  She has not had any significant ear pain, sore throat at this time.  No nausea, vomiting, diarrhea or abdominal pain.  She denies any prior history of asthma, COPD or other breathing related disorders.  She is a non-smoker.  She states concerned that her son was recently diagnosed and treated for pneumonia.      Allergies:  No Known Allergies    Problem List:    Patient Active Problem List    Diagnosis Date Noted     Pigmented skin lesion 01/08/2013     Priority: Medium     SK (seborrheic keratosis) 01/08/2013     Priority: Medium     Seborrheic dermatitis 01/08/2013     Priority: Medium     Diagnosis updated by automated process. Provider to review and confirm.       Acquired hypothyroidism 09/26/2012     Priority: Medium     Problem list name updated by automated process. Provider to review       Easy bruising 09/26/2012     Priority: Medium     Osteoporosis 09/26/2012     Priority: Medium     Problem list name updated by automated process. Provider to review       PSVT (paroxysmal supraventricular tachycardia) (H) 09/26/2012     Priority: Medium     Abnormal weight gain 09/26/2012     Priority: Medium        Past Medical History:    Past Medical History:   Diagnosis Date     Acquired hypothyroidism 9/26/2012     Early menopause      Endometriosis      Osteopenia      PSVT (paroxysmal supraventricular tachycardia) (H) 9/26/2012       Past Surgical History:    Past Surgical History:   Procedure Laterality Date     BREAST SURGERY Left 1981    benign cyst      COLONOSCOPY   "2015    repeat in 10 yrs      ovary removed Left 1986    partial removal of right ovary 1990       Family History:    Family History   Problem Relation Age of Onset     Hypertension Mother      Cerebrovascular Disease Mother      Macular Degeneration Mother      Hypertension Sister      Cancer Father         Lung cancer     Respiratory Father         COPD     Cancer Brother         Lung cancer     Thyroid Disease Sister      Thyroid Disease Sister      Amblyopia No family hx of      Retinal detachment No family hx of      Glaucoma No family hx of        Social History:  Marital Status:   [2]  Social History     Tobacco Use     Smoking status: Never Smoker     Smokeless tobacco: Never Used   Substance Use Topics     Alcohol use: No     Drug use: No        Medications:    B Complex Vitamins (B COMPLEX 1 PO)  Calcium Carbonate-Vitamin D (CALCIUM + D PO)  Cholecalciferol (VITAMIN D-3 PO)  levothyroxine (SYNTHROID/LEVOTHROID) 100 MCG tablet  levothyroxine (SYNTHROID/LEVOTHROID) 88 MCG tablet  rosuvastatin (CRESTOR) 5 MG tablet  VALTREX 500 MG tablet      Review of Systems  CONSTITUTIONAL:POSITIVE for fever up to 101.8, chills, myalgias  INTEGUMENTARY/SKIN: NEGATIVE for worrisome rashes or skin changes   EYES: NEGATIVE for vision changes or irritation  ENT/MOUTH: POSITIVE for nasal congestion, sinus pressure, postnasal drainage and NEGATIVE for sore throat, ear pain  RESP:POSITIVE for cough, shortness of breath, wheezing  GI: NEGATIVE for abdominal pain, diarrhea and vomiting  Physical Exam   BP: 133/66  Heart Rate: 96  Temp: 99.6  F (37.6  C)  Height: 175.3 cm (5' 9\")  Weight: 104.3 kg (230 lb)  SpO2: 99 %  Physical Exam  GENERAL APPEARANCE: healthy, alert and no distress  EYES: EOMI,  PERRL, conjunctiva clear  HENT: ear canals and TM's normal.  Nasal mucosa edematous discharge present.  Tenderness to palpation of bilateral maxillary sinuses   Posterior pharynx is nonerythematous without exudate  NECK: supple, " nontender, no lymphadenopathy  RESP: lungs clear to auscultation - no rales, rhonchi or wheezes  CV: regular rates and rhythm, normal S1 S2, no murmur noted  SKIN: no suspicious lesions or rashes  ED Course        Procedures        Critical Care time:  none          Results for orders placed or performed during the hospital encounter of 11/15/19   Chest XR,  PA & LAT    Narrative    CHEST TWO VIEWS   11/15/2019 4:12 PM     HISTORY: Cough, fever, rule out pneumonia.    COMPARISON: 12/15/2017      Impression    IMPRESSION: No acute cardiopulmonary disease.    MANDO SARMIENTO MD     Medications - No data to display    Assessments & Plan (with Medical Decision Making)     I have reviewed the nursing notes.    I have reviewed the findings, diagnosis, plan and need for follow up with the patient.       Discharge Medication List as of 11/15/2019  4:42 PM      START taking these medications    Details   albuterol (PROAIR HFA/PROVENTIL HFA/VENTOLIN HFA) 108 (90 Base) MCG/ACT inhaler Inhale 2 puffs into the lungs every 6 hours as needed for shortness of breath / dyspnea, Disp-18 g, R-0, E-PrescribePharmacy may dispense brand covered by insurance (Proair, or proventil or ventolin or generic albuterol inhaler)      benzonatate (TESSALON) 100 MG capsule Take 1-2 capsules (100-200 mg) by mouth 3 times daily as needed for cough, Disp-42 capsule, R-0, E-Prescribe      cefuroxime (CEFTIN) 500 MG tablet Take 1 tablet (500 mg) by mouth 2 times daily for 10 days, Disp-20 tablet, R-0, Local Print      predniSONE (DELTASONE) 20 MG tablet Take 2 tablets (40 mg) by mouth daily for 5 days, Disp-10 tablet, R-0, Local Print           Final diagnoses:   Viral URI with cough   Acute recurrent maxillary sinusitis     64-year-old female presents to the urgent care with concern over 3-day history of cough accompanied by fever, chills, myalgias, headache, nasal congestion, sinus pressure, shortness of breath and wheezing.  She had stable vital signs  upon arrival.  Physical exam findings as described above were significant for nasal mucosal edema, tenderness palpation of bilateral maxillary sinuses.  As part of evaluation she did Chest x-ray which was negative for acute infiltrate, pneumothorax, pleural effusion or change in cardiopulmonary vasculature.  I discussed with patient that based on duration of symptoms I suspect that her sinus inflammation is due to viral illness I have low suspicion for true bacterial sinusitis.  Differential also include allergic rhinitis, influenza.  She was discharged home stable with prescription for albuterol, Tessalon, prednisone for symptomatic relief.  After a discussion I did ultimately agree to provide prescription for antibiotic per patient's insistence.  She was discharged home stable with prescription for Ceftin.  She was instructed to follow up with PCP if no resolution of fever in 48-72 hours.  worrisome reasons to return to ER/UC sooner discussed.     Disclaimer: This note consists of symbols derived from keyboarding, dictation, and/or voice recognition software. As a result, there may be errors in the script that have gone undetected.  Please consider this when interpreting information found in the chart.    11/15/2019   [facility] EMERGENCY DEPARTMENT     Yadira Wang PA-C  11/19/19 1028

## 2019-11-15 NOTE — ED AVS SNAPSHOT
[facility] Emergency Department  [address] [address]  Phone:  [phone]  Fax:  [phone]                                    Elizabeth Treviño   MRN: [id number]    Department:  [facility] Emergency Department   Date of Visit:  11/15/2019           After Visit Summary Signature Page    I have received my discharge instructions, and my questions have been answered. I have discussed any challenges I see with this plan with the nurse or doctor.    ..........................................................................................................................................  Patient/Patient Representative Signature      ..........................................................................................................................................  Patient Representative Print Name and Relationship to Patient    ..................................................               ................................................  Date                                   Time    ..........................................................................................................................................  Reviewed by Signature/Title    ...................................................              ..............................................  Date                                               Time          22EPIC Rev 08/18

## 2019-11-21 ENCOUNTER — OFFICE VISIT (OUTPATIENT)
Dept: FAMILY MEDICINE | Facility: CLINIC | Age: 64
End: 2019-11-21
Payer: COMMERCIAL

## 2019-11-21 VITALS
WEIGHT: 249.7 LBS | DIASTOLIC BLOOD PRESSURE: 81 MMHG | BODY MASS INDEX: 36.98 KG/M2 | TEMPERATURE: 98.1 F | SYSTOLIC BLOOD PRESSURE: 137 MMHG | OXYGEN SATURATION: 97 % | HEART RATE: 70 BPM | HEIGHT: 69 IN

## 2019-11-21 DIAGNOSIS — J06.9 URI, ACUTE: Primary | ICD-10-CM

## 2019-11-21 DIAGNOSIS — R09.89 CHEST CONGESTION: ICD-10-CM

## 2019-11-21 DIAGNOSIS — R05.9 COUGH: ICD-10-CM

## 2019-11-21 RX ORDER — AZITHROMYCIN 250 MG/1
TABLET, FILM COATED ORAL
Qty: 6 TABLET | Refills: 0 | Status: SHIPPED | OUTPATIENT
Start: 2019-11-21 | End: 2019-11-26

## 2019-11-21 ASSESSMENT — PAIN SCALES - GENERAL: PAINLEVEL: NO PAIN (0)

## 2019-11-21 ASSESSMENT — MIFFLIN-ST. JEOR: SCORE: 1747.01

## 2019-11-21 NOTE — PROGRESS NOTES
"       HPI       Elizabeth Treviño is a 64 year old woman who presents for follow up on her URI. She was seen in the ER on the 15th. She was started on Albuterol, tessalon, ceftin and prednisone. She is not using the albuterol very often. She is not taking the prednisone, she did not start this. Her son has pneumonia. She presents today for exam and evaluation.   Chief Complaint   Patient presents with     Respiratory Problems     Pt is here to discuss fluid on lungs.      Problem, Medication and Allergy Lists were reviewed and updated if needed.    Patient is an established patient of this clinic.         Review of Systems:   Review of Systems     Constitutional:  Positive for fever and fatigue. Negative for chills.   Respiratory:   Positive for cough and shortness of breath.                Physical Exam:     Vitals:    11/21/19 1415   BP: 137/81   Pulse: 70   SpO2: 97%   Weight: 113.3 kg (249 lb 11.2 oz)   Height: 1.753 m (5' 9\")     Body mass index is 36.87 kg/m .  Vitals were reviewed and were normal.     Physical Exam  Constitutional:       Appearance: Normal appearance.   HENT:      Head: Normocephalic.   Cardiovascular:      Rate and Rhythm: Normal rate and regular rhythm.      Pulses: Normal pulses.      Heart sounds: Normal heart sounds.   Pulmonary:      Effort: Pulmonary effort is normal.      Breath sounds: Examination of the right-lower field reveals rhonchi. Examination of the left-lower field reveals rhonchi. Rhonchi present.   Musculoskeletal: Normal range of motion.   Skin:     General: Skin is warm and dry.   Neurological:      General: No focal deficit present.      Mental Status: She is alert and oriented to person, place, and time.   Psychiatric:         Mood and Affect: Mood normal.         Behavior: Behavior normal.         Results:     No additional diagnostics ordered today.     Assessment and Plan     1. URI, acute    - azithromycin (ZITHROMAX) 250 MG tablet; Take 2 tablets (500 mg) by mouth " daily for 1 day, THEN 1 tablet (250 mg) daily for 4 days.  Dispense: 6 tablet; Refill: 0    2. Cough      3. Chest congestion    There are no discontinued medications.  Total time spent 25 minutes.  More than 50% of the time spent with patient on counseling / coordinating her care.Patient instructions: first, please stop the Ceftin and the Tessalon pearls. Next, start the Prednisone and the Zithromax. Next, use the ventolin inhaler every 4 hours while awake. Next, drink 100 oz water daily. Next, return on 11/25 if your nor 85% better. Thank you. Options for treatment and follow-up care were reviewed with the patient. Elizabeth Treviño  engaged in the decision making process and verbalized understanding of the options discussed and agreed with the final plan.  SAY Colon, CNP

## 2019-11-22 ASSESSMENT — ENCOUNTER SYMPTOMS
FATIGUE: 1
CHILLS: 0
COUGH: 1
FEVER: 1
SHORTNESS OF BREATH: 1

## 2020-01-27 ENCOUNTER — MYC REFILL (OUTPATIENT)
Dept: INTERNAL MEDICINE | Facility: CLINIC | Age: 65
End: 2020-01-27

## 2020-01-27 DIAGNOSIS — E78.2 MIXED HYPERLIPIDEMIA: ICD-10-CM

## 2020-01-27 RX ORDER — ROSUVASTATIN CALCIUM 5 MG/1
5 TABLET, COATED ORAL DAILY
Qty: 90 TABLET | Refills: 2 | Status: SHIPPED | OUTPATIENT
Start: 2020-01-27 | End: 2021-02-10

## 2020-06-05 DIAGNOSIS — E03.9 ACQUIRED HYPOTHYROIDISM: ICD-10-CM

## 2020-06-05 LAB
T4 FREE SERPL-MCNC: 1.06 NG/DL (ref 0.76–1.46)
TSH SERPL DL<=0.005 MIU/L-ACNC: 5.3 MU/L (ref 0.4–4)

## 2020-06-05 PROCEDURE — 36415 COLL VENOUS BLD VENIPUNCTURE: CPT | Performed by: FAMILY MEDICINE

## 2020-06-05 PROCEDURE — 84443 ASSAY THYROID STIM HORMONE: CPT | Performed by: FAMILY MEDICINE

## 2020-06-05 PROCEDURE — 84439 ASSAY OF FREE THYROXINE: CPT | Performed by: FAMILY MEDICINE

## 2020-06-09 ENCOUNTER — MYC MEDICAL ADVICE (OUTPATIENT)
Dept: FAMILY MEDICINE | Facility: CLINIC | Age: 65
End: 2020-06-09

## 2020-06-09 DIAGNOSIS — E03.9 ACQUIRED HYPOTHYROIDISM: Primary | ICD-10-CM

## 2020-06-09 RX ORDER — LEVOTHYROXINE SODIUM 125 UG/1
125 TABLET ORAL DAILY
Qty: 90 TABLET | Refills: 1 | Status: SHIPPED | OUTPATIENT
Start: 2020-06-09 | End: 2021-03-29

## 2020-06-09 NOTE — PROGRESS NOTES
6-9-20 TSH still mildly up will increase levo to 125mcg/day and recheck TSH in 2 months  Coleen Smith MD, PhD

## 2020-06-10 ENCOUNTER — DOCUMENTATION ONLY (OUTPATIENT)
Dept: FAMILY MEDICINE | Facility: CLINIC | Age: 65
End: 2020-06-10

## 2020-06-10 DIAGNOSIS — E03.9 ACQUIRED HYPOTHYROIDISM: Primary | ICD-10-CM

## 2020-06-11 NOTE — PROGRESS NOTES
6-10-20 pt's TSH was still up - I suggested increasing levothyroxine to 125mcg/day - she called and said she is not taking it daily - so would like to take daily and repeat in 1 month - so she will stay at 100mcg/day and repeat the test in 1 month  Coleen Smith MD, PhD

## 2020-07-14 ENCOUNTER — MYC MEDICAL ADVICE (OUTPATIENT)
Dept: FAMILY MEDICINE | Facility: CLINIC | Age: 65
End: 2020-07-14

## 2020-07-15 ENCOUNTER — MYC MEDICAL ADVICE (OUTPATIENT)
Dept: FAMILY MEDICINE | Facility: CLINIC | Age: 65
End: 2020-07-15

## 2020-07-20 ENCOUNTER — VIRTUAL VISIT (OUTPATIENT)
Dept: FAMILY MEDICINE | Facility: CLINIC | Age: 65
End: 2020-07-20
Attending: FAMILY MEDICINE
Payer: COMMERCIAL

## 2020-07-20 DIAGNOSIS — L98.9 SKIN LESION: Primary | ICD-10-CM

## 2020-07-20 DIAGNOSIS — E03.9 ACQUIRED HYPOTHYROIDISM: ICD-10-CM

## 2020-07-20 NOTE — LETTER
"7/20/2020       RE: Elizabeth Treviño  [address]  [facility] [address]     Dear Colleague,    Thank you for referring your patient, Elizabeth Treviño, to the [facility] at [facility]. Please see a copy of my visit note below.    Elizabeth Treviño is a 65 year old female who is being evaluated via a billable video visit.      The patient has been notified of following:     \"This video visit will be conducted via a call between you and your physician/provider. We have found that certain health care needs can be provided without the need for an in-person physical exam.  This service lets us provide the care you need with a video conversation.  If a prescription is necessary we can send it directly to your pharmacy.  If lab work is needed we can place an order for that and you can then stop by our lab to have the test done at a later time.    Video visits are billed at different rates depending on your insurance coverage.  Please reach out to your insurance provider with any questions.    If during the course of the call the physician/provider feels a video visit is not appropriate, you will not be charged for this service.\"    Patient has given verbal consent for Video visit? Yes  How would you like to obtain your AVS? MyChart  If you are dropped from the video visit, the video invite should be resent to: Text to cell phone: [phone]  Will anyone else be joining your video visit? No    Subjective     Elizabeth Treviño is a 65 year old female who presents today via video visit for the following health issues:skin lesion    Video Start Time:2:46      Diagnostic Test Results:  Labs reviewed in Epic              Video-Visit Details    Type of service:  Video Visit    Video End Time:2:55    Originating Location (pt. Location): Home    Distant Location (provider location):  [facility]     Platform used for Video Visit: Brigitte Smith, " MD PhD    S clinic  Established Patient visit    Name: Elizabeth Treviño MRN: [id number]     Age: 65 year old           Chief Complaint:    YOB: 1955       CC:skin lesons          HPI and ROS:   HPI:    65 year old female with a PMH of osteopenia and hypothyroidism, who has noticed new skin lesions in the last few months.  They are located on her anterior chest - the one on the left is rough and not growing, no bleeding.  The one on the right is light brown and not growing and not bleeding.  Neither one is symptomatic.      She is on thyroid supplement.  Her TSH is due.  We have been adjusting her meds.       ROS:  A 10-point Review of Systems was performed and + for mild leg swelling, and mild problem with balance and is negative other than mentioned in HPI         Past Medical History:     Past Medical History:   Diagnosis Date     Acquired hypothyroidism 9/26/2012     Problem list name updated by automated process. Provider to review     Early menopause     ovarian failure at age 36      Endometriosis      Osteopenia      PSVT (paroxysmal supraventricular tachycardia) (H) 9/26/2012             Past Surgical History:      Past Surgical History:   Procedure Laterality Date     BREAST SURGERY Left 1981    benign cyst      COLONOSCOPY  2015    repeat in 10 yrs      ovary removed Left 1986    partial removal of right ovary 1990               Immunizations:     Immunization History   Administered Date(s) Administered     FLU 6-35 months 11/29/2017     Flu, Unspecified 10/15/2012     HEPA 11/05/2010     HepA-Adult 03/10/2016     Influenza (IIV3) PF 11/09/2007, 10/29/2011, 09/26/2012, 10/30/2013, 12/19/2014     TD (ADULT, 7+) 11/05/2010     TDAP Vaccine (Boostrix) 08/05/2015     Zoster vaccine recombinant adjuvanted (SHINGRIX) 01/03/2020, 03/12/2020     Zoster vaccine, live 03/10/2016             Allergies:   No Known Allergies          Medications:   albuterol (PROAIR HFA/PROVENTIL HFA/VENTOLIN HFA) 108 (90  Base) MCG/ACT inhaler, Inhale 2 puffs into the lungs every 6 hours as needed for shortness of breath / dyspnea  B Complex Vitamins (B COMPLEX 1 PO), Take 1 tablet by mouth daily   Calcium Carbonate-Vitamin D (CALCIUM + D PO), Take 2 tablets by mouth every evening 500 mg calcium    Cholecalciferol (VITAMIN D-3 PO), Take 1,000 Int'l Units by mouth every evening   levothyroxine (SYNTHROID/LEVOTHROID) 100 MCG tablet, Take 1 tablet (100 mcg) by mouth daily  levothyroxine (SYNTHROID/LEVOTHROID) 125 MCG tablet, Take 1 tablet (125 mcg) by mouth daily  levothyroxine (SYNTHROID/LEVOTHROID) 88 MCG tablet, Take 1 tablet (88 mcg) by mouth daily  rosuvastatin (CRESTOR) 5 MG tablet, Take 1 tablet (5 mg) by mouth daily  VALTREX 500 MG tablet, Take 1 tablet (500 mg) by mouth 2 times daily    No current facility-administered medications on file prior to visit.            Exam:   There were no vitals taken for this visit.  GENERAL: Healthy, alert and no distress  EYES: Eyes grossly normal to inspection.  No discharge or erythema, or obvious scleral/conjunctival abnormalities.  RESP: No audible wheeze, cough, or visible cyanosis.  No visible retractions or increased work of breathing.    SKIN: left anterior chest below the clavicle, a 1 cm flesh colored skin lesion slightly raised and rough by pt report, right anterior chest a approximately .5cm  Light brown circular macular papular lesion. Rest of Visible skin clear. No significant rash.   NEURO: Cranial nerves grossly intact.  Mentation and speech appropriate for age.  PSYCH: Mentation appears normal, affect normal/bright, judgement and insight intact, normal speech and appearance well-groomed.              Labs:   A1C  No results found for: A1C    Lipid panel  Cholesterol   Date Value Ref Range Status   08/14/2019 233 (H) <200 mg/dL Final     Comment:     Desirable:       <200 mg/dl   08/04/2017 162 <200 mg/dL Final     HDL Cholesterol   Date Value Ref Range Status   08/14/2019 69  >49 mg/dL Final   08/04/2017 67 >49 mg/dL Final     LDL Cholesterol Calculated   Date Value Ref Range Status   08/14/2019 142 (H) <100 mg/dL Final     Comment:     Above desirable:  100-129 mg/dl  Borderline High:  130-159 mg/dL  High:             160-189 mg/dL  Very high:       >189 mg/dl     08/04/2017 77 <100 mg/dL Final     Comment:     Desirable:       <100 mg/dl     Triglycerides   Date Value Ref Range Status   08/14/2019 112 <150 mg/dL Final   08/04/2017 93 <150 mg/dL Final     Cholesterol/HDL Ratio   Date Value Ref Range Status   08/05/2015 3.6 0.0 - 5.0 Final   07/23/2014 3.9 0.0 - 5.0 Final            Assessment and Plan:     Assessment:  64 yo female has concern for 2 skin lesions    (L98.9) Skin lesion  (primary encounter diagnosis)  Comment: likely both represent seborrheic keratoses.  Do not appear suspicious  Plan: follow - if grow or bleed come in for biopsy - offered to freeze if get symptomatic (itching), emphasized sun screen    (E03.9) Acquired hypothyroidism  Comment: on supplement - TSH due  Plan: follow     Coleen Smith MD,PhD    RTC: prkenny

## 2020-07-20 NOTE — PATIENT INSTRUCTIONS
Get TSH for thyroid check  Watch skin lesions - if grows or bleeds let us know  If become symptomatic we could freeze them

## 2020-07-20 NOTE — PROGRESS NOTES
"Elizabeth Treviño is a 65 year old female who is being evaluated via a billable video visit.      The patient has been notified of following:     \"This video visit will be conducted via a call between you and your physician/provider. We have found that certain health care needs can be provided without the need for an in-person physical exam.  This service lets us provide the care you need with a video conversation.  If a prescription is necessary we can send it directly to your pharmacy.  If lab work is needed we can place an order for that and you can then stop by our lab to have the test done at a later time.    Video visits are billed at different rates depending on your insurance coverage.  Please reach out to your insurance provider with any questions.    If during the course of the call the physician/provider feels a video visit is not appropriate, you will not be charged for this service.\"    Patient has given verbal consent for Video visit? Yes  How would you like to obtain your AVS? MyChart  If you are dropped from the video visit, the video invite should be resent to: Text to cell phone: [phone]  Will anyone else be joining your video visit? No    Subjective     Elizabeth Treviño is a 65 year old female who presents today via video visit for the following health issues:skin lesion    Video Start Time:2:46      Diagnostic Test Results:  Labs reviewed in Epic              Video-Visit Details    Type of service:  Video Visit    Video End Time:2:55    Originating Location (pt. Location): Home    Distant Location (provider location):  [facility]     Platform used for Video Visit: [employer]           Coleen Smith MD PhD    S clinic  Established Patient visit    Name: Elizabeth Treviño MRN: [id number]     Age: 65 year old           Chief Complaint:    YOB: 1955       CC:skin lesons          HPI and ROS:   HPI:    65 year old female with a PMH of osteopenia and hypothyroidism, " who has noticed new skin lesions in the last few months.  They are located on her anterior chest - the one on the left is rough and not growing, no bleeding.  The one on the right is light brown and not growing and not bleeding.  Neither one is symptomatic.      She is on thyroid supplement.  Her TSH is due.  We have been adjusting her meds.       ROS:  A 10-point Review of Systems was performed and + for mild leg swelling, and mild problem with balance and is negative other than mentioned in HPI         Past Medical History:     Past Medical History:   Diagnosis Date     Acquired hypothyroidism 9/26/2012     Problem list name updated by automated process. Provider to review     Early menopause     ovarian failure at age 36      Endometriosis      Osteopenia      PSVT (paroxysmal supraventricular tachycardia) (H) 9/26/2012             Past Surgical History:      Past Surgical History:   Procedure Laterality Date     BREAST SURGERY Left 1981    benign cyst      COLONOSCOPY  2015    repeat in 10 yrs      ovary removed Left 1986    partial removal of right ovary 1990               Immunizations:     Immunization History   Administered Date(s) Administered     FLU 6-35 months 11/29/2017     Flu, Unspecified 10/15/2012     HEPA 11/05/2010     HepA-Adult 03/10/2016     Influenza (IIV3) PF 11/09/2007, 10/29/2011, 09/26/2012, 10/30/2013, 12/19/2014     TD (ADULT, 7+) 11/05/2010     TDAP Vaccine (Boostrix) 08/05/2015     Zoster vaccine recombinant adjuvanted (SHINGRIX) 01/03/2020, 03/12/2020     Zoster vaccine, live 03/10/2016             Allergies:   No Known Allergies          Medications:   albuterol (PROAIR HFA/PROVENTIL HFA/VENTOLIN HFA) 108 (90 Base) MCG/ACT inhaler, Inhale 2 puffs into the lungs every 6 hours as needed for shortness of breath / dyspnea  B Complex Vitamins (B COMPLEX 1 PO), Take 1 tablet by mouth daily   Calcium Carbonate-Vitamin D (CALCIUM + D PO), Take 2 tablets by mouth every evening 500 mg  calcium    Cholecalciferol (VITAMIN D-3 PO), Take 1,000 Int'l Units by mouth every evening   levothyroxine (SYNTHROID/LEVOTHROID) 100 MCG tablet, Take 1 tablet (100 mcg) by mouth daily  levothyroxine (SYNTHROID/LEVOTHROID) 125 MCG tablet, Take 1 tablet (125 mcg) by mouth daily  levothyroxine (SYNTHROID/LEVOTHROID) 88 MCG tablet, Take 1 tablet (88 mcg) by mouth daily  rosuvastatin (CRESTOR) 5 MG tablet, Take 1 tablet (5 mg) by mouth daily  VALTREX 500 MG tablet, Take 1 tablet (500 mg) by mouth 2 times daily    No current facility-administered medications on file prior to visit.            Exam:   There were no vitals taken for this visit.  GENERAL: Healthy, alert and no distress  EYES: Eyes grossly normal to inspection.  No discharge or erythema, or obvious scleral/conjunctival abnormalities.  RESP: No audible wheeze, cough, or visible cyanosis.  No visible retractions or increased work of breathing.    SKIN: left anterior chest below the clavicle, a 1 cm flesh colored skin lesion slightly raised and rough by pt report, right anterior chest a approximately .5cm  Light brown circular macular papular lesion. Rest of Visible skin clear. No significant rash.   NEURO: Cranial nerves grossly intact.  Mentation and speech appropriate for age.  PSYCH: Mentation appears normal, affect normal/bright, judgement and insight intact, normal speech and appearance well-groomed.              Labs:   A1C  No results found for: A1C    Lipid panel  Cholesterol   Date Value Ref Range Status   08/14/2019 233 (H) <200 mg/dL Final     Comment:     Desirable:       <200 mg/dl   08/04/2017 162 <200 mg/dL Final     HDL Cholesterol   Date Value Ref Range Status   08/14/2019 69 >49 mg/dL Final   08/04/2017 67 >49 mg/dL Final     LDL Cholesterol Calculated   Date Value Ref Range Status   08/14/2019 142 (H) <100 mg/dL Final     Comment:     Above desirable:  100-129 mg/dl  Borderline High:  130-159 mg/dL  High:             160-189 mg/dL  Very  high:       >189 mg/dl     08/04/2017 77 <100 mg/dL Final     Comment:     Desirable:       <100 mg/dl     Triglycerides   Date Value Ref Range Status   08/14/2019 112 <150 mg/dL Final   08/04/2017 93 <150 mg/dL Final     Cholesterol/HDL Ratio   Date Value Ref Range Status   08/05/2015 3.6 0.0 - 5.0 Final   07/23/2014 3.9 0.0 - 5.0 Final            Assessment and Plan:     Assessment:  64 yo female has concern for 2 skin lesions    (L98.9) Skin lesion  (primary encounter diagnosis)  Comment: likely both represent seborrheic keratoses.  Do not appear suspicious  Plan: follow - if grow or bleed come in for biopsy - offered to freeze if get symptomatic (itching), emphasized sun screen    (E03.9) Acquired hypothyroidism  Comment: on supplement - TSH due  Plan: follow     Coleen Smith MD,PhD    RTC: prn       9-24-20 pt TSH now normal - unclear if she is on 100 or 125 mcg/day - asked pt to clarify  Coleen Smith MD, PhD

## 2020-08-24 DIAGNOSIS — M25.562 ACUTE PAIN OF LEFT KNEE: Primary | ICD-10-CM

## 2020-08-25 NOTE — PROGRESS NOTES
8-24-20 pt called - fell on knee and having acute pain - has had pain in knee before - did not specify which knee.  Will have her seen by sports medicine.  Coleen Smith MD, PhD

## 2020-08-31 ENCOUNTER — ANCILLARY PROCEDURE (OUTPATIENT)
Dept: GENERAL RADIOLOGY | Facility: CLINIC | Age: 65
End: 2020-08-31
Attending: FAMILY MEDICINE
Payer: COMMERCIAL

## 2020-08-31 ENCOUNTER — OFFICE VISIT (OUTPATIENT)
Dept: ORTHOPEDICS | Facility: CLINIC | Age: 65
End: 2020-08-31
Attending: FAMILY MEDICINE
Payer: COMMERCIAL

## 2020-08-31 VITALS — DIASTOLIC BLOOD PRESSURE: 78 MMHG | SYSTOLIC BLOOD PRESSURE: 122 MMHG | HEIGHT: 69 IN | BODY MASS INDEX: 36.87 KG/M2

## 2020-08-31 DIAGNOSIS — M25.562 ACUTE PAIN OF LEFT KNEE: ICD-10-CM

## 2020-08-31 DIAGNOSIS — M17.12 ARTHRITIS OF LEFT KNEE: Primary | ICD-10-CM

## 2020-08-31 PROCEDURE — 99204 OFFICE O/P NEW MOD 45 MIN: CPT | Mod: 25 | Performed by: FAMILY MEDICINE

## 2020-08-31 PROCEDURE — 73562 X-RAY EXAM OF KNEE 3: CPT | Mod: LT

## 2020-08-31 PROCEDURE — 20611 DRAIN/INJ JOINT/BURSA W/US: CPT | Mod: LT | Performed by: FAMILY MEDICINE

## 2020-08-31 RX ORDER — ROPIVACAINE HYDROCHLORIDE 5 MG/ML
3 INJECTION, SOLUTION EPIDURAL; INFILTRATION; PERINEURAL
Status: DISCONTINUED | OUTPATIENT
Start: 2020-08-31 | End: 2022-10-03

## 2020-08-31 RX ORDER — BETAMETHASONE SODIUM PHOSPHATE AND BETAMETHASONE ACETATE 3; 3 MG/ML; MG/ML
6 INJECTION, SUSPENSION INTRA-ARTICULAR; INTRALESIONAL; INTRAMUSCULAR; SOFT TISSUE
Status: DISCONTINUED | OUTPATIENT
Start: 2020-08-31 | End: 2022-10-03

## 2020-08-31 RX ADMIN — ROPIVACAINE HYDROCHLORIDE 3 ML: 5 INJECTION, SOLUTION EPIDURAL; INFILTRATION; PERINEURAL at 07:45

## 2020-08-31 RX ADMIN — BETAMETHASONE SODIUM PHOSPHATE AND BETAMETHASONE ACETATE 6 MG: 3; 3 INJECTION, SUSPENSION INTRA-ARTICULAR; INTRALESIONAL; INTRAMUSCULAR; SOFT TISSUE at 07:45

## 2020-08-31 NOTE — PROGRESS NOTES
ASSESSMENT & PLAN  Elizabeth was seen today for pain.    Diagnoses and all orders for this visit:    Arthritis of left knee    Acute pain of left knee  -     Orthopedic & Spine  Referral  -     XR Knee Standing AP Bilat [place] Bilat Lat Left; Future    Other orders  -     Large Joint Injection/Arthocentesis: L knee joint      Patient is a 65 year old female presenting for evaluation of   Chief Complaint   Patient presents with     Left Knee - Pain      # Left Knee Arthritis: Sx noted over the past 4 yrs worsening recently while trying to catch 3 yo grandson.  Pt having pain over medial knee and pes anserine bursa with TTP over respective areas and moderate knee effusion.  XR today shwoing OA in the medial and PF compartments likely cause of pain less likely pes anserine bursitis.  Counseled patient on nature of condition and treatment options.  Given this plan as below, follow-up as needed  Treatment: activities as tolerated, weightloss encouraged  Physical Therapy HEP given today, if not improved can refer for formal PT  Injection left knee aspiration/steriod injection as below  Medications  Limited NSAIDs/Tylenol    Concerning signs/sx that would warrant urgent evaluation were discussed.  All questions were answered, patient understands and agrees with plan.      Return if symptoms worsen or fail to improve.    -----    SUBJECTIVE  Elizabeth Treviño is a/an 65 year old female who is seen in consultation at the request of  Coleen Smith M.D. for evaluation of left knee pain. The patient is seen by themselves.    Onset: 3 week(s) ago. Patient describes injury as running to catch grandson.   Location of Pain: left medial knee   Rating of Pain at worst: 10/10  Rating of Pain Currently: 10/10  Worsened by: sitting, knee flexion    Better with: walking, heat, ice    Treatments tried: rest/activity avoidance, ice, heat and Tylenol  Associated symptoms: burning  Orthopedic history: YES - Fell on side walk 4 years  ago-did not have knee evaluated.   Relevant surgical history: NO  Social:  [employer]  Hobbies: Fishing   Past Medical History:   Diagnosis Date     Acquired hypothyroidism 9/26/2012     Problem list name updated by automated process. Provider to review     Early menopause     ovarian failure at age 36      Endometriosis      Osteopenia      PSVT (paroxysmal supraventricular tachycardia) (H) 9/26/2012     Social History     Socioeconomic History     Marital status:      Spouse name: None     Number of children: None     Years of education: None     Highest education level: None   Occupational History     Occupation: executive     Employer: [employer]   Social Needs     Financial resource strain: None     Food insecurity     Worry: None     Inability: None     Transportation needs     Medical: None     Non-medical: None   Tobacco Use     Smoking status: Never Smoker     Smokeless tobacco: Never Used   Substance and Sexual Activity     Alcohol use: No     Drug use: No     Sexual activity: Not Currently     Partners: Male     Birth control/protection: Post-menopausal   Lifestyle     Physical activity     Days per week: 7 days     Minutes per session: 40 min     Stress: Only a little   Relationships     Social connections     Talks on phone: None     Gets together: None     Attends Protestant service: None     Active member of club or organization: None     Attends meetings of clubs or organizations: None     Relationship status: None     Intimate partner violence     Fear of current or ex partner: None     Emotionally abused: None     Physically abused: None     Forced sexual activity: None   Other Topics Concern     Parent/sibling w/ CABG, MI or angioplasty before 65F 55M? Not Asked   Social History Narrative    , adopted son,  is retired - working full time          Patient's past medical, surgical, social, and family histories were reviewed today and no changes are noted.    REVIEW OF  "SYSTEMS:  10 point ROS is negative other than symptoms noted above in HPI, Past Medical History or as stated below  Constitutional: NEGATIVE for fever, chills, change in weight  Skin: NEGATIVE for worrisome rashes, moles or lesions  GI/: NEGATIVE for bowel or bladder changes  Neuro: NEGATIVE for weakness, dizziness or paresthesias    OBJECTIVE:  /78   Ht 1.753 m (5' 9\")   BMI 36.87 kg/m     General: healthy, alert and in no distress  HEENT: no scleral icterus or conjunctival erythema  Skin: no suspicious lesions or rash. No jaundice.  CV: no pedal edema  Resp: normal respiratory effort without conversational dyspnea   Psych: normal mood and affect  Gait: normal steady gait with appropriate coordination and balance  Neuro: Normal light sensory exam of lower extremity  MSK:  LEFT KNEE  Inspection:    normal alignment  Palpation:    Tender about the medial joint line and pes anserine bursa. Remainder of bony and ligamentous landmarks are nontender.    Moderate effusion is present    Patellofemoral crepitus is Absent  Range of Motion:     00 extension to 1350 flexion  Strength:    Quadriceps 5/5, hamstrings 5/5, gastrocsoleus 5/5 and tibialis anterior 5/5    Extensor mechanism intact  Special Tests:    Positive: Patellar grind    Negative: MCL/valgus stress (0 & 30 deg), LCL/varus stress (0 & 30 deg), Lachman's, anterior drawer, posterior drawer, Michael's    Independent visualization of the below image:  Recent Results (from the past 24 hour(s))   XR Knee Standing AP Bilat [place] Bilat Lat Left    Narrative    KNEE STANDING AP BILATERAL, SUNRISE BILATERAL, LATERAL LEFT  8/31/2020  7:28 AM     HISTORY:  Acute pain of left knee.    COMPARISON: 10/25/2012 left knee radiographs.      Impression    IMPRESSION:  1. Right: Minimal patellofemoral and medial compartment  osteoarthrosis. No lateral view.    2. Left: Minimal medial compartment and patellofemoral compartment  osteoarthrosis. No significant effusion. " There is no evidence of  fracture or calcified intra-articular body. Mild progression of the  degenerative changes.       I  ordered, visualized and reviewed these images with the patient  Mild medial and PF joint line degeneration with small effusion  Large Joint Injection/Arthocentesis: L knee joint    Date/Time: 8/31/2020 7:45 AM  Performed by: Santana Manning MD  Authorized by: Santana Manning MD     Indications:  Pain and osteoarthritis  Needle Size:  18 G  Guidance: ultrasound    Approach:  Superolateral  Location:  Knee      Medications:  6 mg betamethasone acet & sod phos 6 (3-3) MG/ML; 3 mL ropivacaine 5 MG/ML  Medications comment:  Actual amount of ropivacaine used 4 mL  Aspirate amount (mL):  11  Aspirate:  Serous and yellow  Outcome:  Tolerated well, no immediate complications  Procedure discussed: discussed risks, benefits, and alternatives    Consent Given by:  Patient  Timeout: timeout called immediately prior to procedure    Prep: patient was prepped and draped in usual sterile fashion     Patient counseled on risks of infection related to steroids and COVID-19.  Patient reported significant improvement of pain after left knee aspiration and steroid injection.  Aftercare instructions given to patient.  Plan to follow-up as discussed above.     Santana Manning MD [facility]            Patient's conditions were thoroughly discussed during today's visit with greater than 50% of the visit spent counseling the patient with total time spent face-to-face with the patient being 30 minutes.    Santana Manning MD, [facility]

## 2020-08-31 NOTE — LETTER
8/31/2020         RE: Elizabeth Treviño  [address]  [facility] [address]        Dear Colleague,    Thank you for referring your patient, Elizabeth Treviño, to the [facility]. Please see a copy of my visit note below.    ASSESSMENT & PLAN  Elizabeth was seen today for pain.    Diagnoses and all orders for this visit:    Arthritis of left knee    Acute pain of left knee  -     Orthopedic & Spine  Referral  -     XR Knee Standing AP Bilat [place] Bilat Lat Left; Future    Other orders  -     Large Joint Injection/Arthocentesis: L knee joint      Patient is a 65 year old female presenting for evaluation of   Chief Complaint   Patient presents with     Left Knee - Pain      # Left Knee Arthritis: Sx noted over the past 4 yrs worsening recently while trying to catch 3 yo grandson.  Pt having pain over medial knee and pes anserine bursa with TTP over respective areas and moderate knee effusion.  XR today shwoing OA in the medial and PF compartments likely cause of pain less likely pes anserine bursitis.  Counseled patient on nature of condition and treatment options.  Given this plan as below, follow-up as needed  Treatment: activities as tolerated, weightloss encouraged  Physical Therapy HEP given today, if not improved can refer for formal PT  Injection left knee aspiration/steriod injection as below  Medications  Limited NSAIDs/Tylenol    Concerning signs/sx that would warrant urgent evaluation were discussed.  All questions were answered, patient understands and agrees with plan.      Return if symptoms worsen or fail to improve.    -----    SUBJECTIVE  Elizabeth Treviño is a/an 65 year old female who is seen in consultation at the request of  Coleen Smith M.D. for evaluation of left knee pain. The patient is seen by themselves.    Onset: 3 week(s) ago. Patient describes injury as running to catch grandson.   Location of Pain: left medial knee   Rating of Pain at worst: 10/10  Rating  of Pain Currently: 10/10  Worsened by: sitting, knee flexion    Better with: walking, heat, ice    Treatments tried: rest/activity avoidance, ice, heat and Tylenol  Associated symptoms: burning  Orthopedic history: YES - Fell on side walk 4 years ago-did not have knee evaluated.   Relevant surgical history: NO  Social:  [employer]  Hobbies: Fishing   Past Medical History:   Diagnosis Date     Acquired hypothyroidism 9/26/2012     Problem list name updated by automated process. Provider to review     Early menopause     ovarian failure at age 36      Endometriosis      Osteopenia      PSVT (paroxysmal supraventricular tachycardia) (H) 9/26/2012     Social History     Socioeconomic History     Marital status:      Spouse name: None     Number of children: None     Years of education: None     Highest education level: None   Occupational History     Occupation: executive     Employer: [employer]   Social Needs     Financial resource strain: None     Food insecurity     Worry: None     Inability: None     Transportation needs     Medical: None     Non-medical: None   Tobacco Use     Smoking status: Never Smoker     Smokeless tobacco: Never Used   Substance and Sexual Activity     Alcohol use: No     Drug use: No     Sexual activity: Not Currently     Partners: Male     Birth control/protection: Post-menopausal   Lifestyle     Physical activity     Days per week: 7 days     Minutes per session: 40 min     Stress: Only a little   Relationships     Social connections     Talks on phone: None     Gets together: None     Attends Mormonism service: None     Active member of club or organization: None     Attends meetings of clubs or organizations: None     Relationship status: None     Intimate partner violence     Fear of current or ex partner: None     Emotionally abused: None     Physically abused: None     Forced sexual activity: None   Other Topics Concern     Parent/sibling w/ CABG, MI or angioplasty before 65F  "55M? Not Asked   Social History Narrative    , adopted son,  is retired - working full time          Patient's past medical, surgical, social, and family histories were reviewed today and no changes are noted.    REVIEW OF SYSTEMS:  10 point ROS is negative other than symptoms noted above in HPI, Past Medical History or as stated below  Constitutional: NEGATIVE for fever, chills, change in weight  Skin: NEGATIVE for worrisome rashes, moles or lesions  GI/: NEGATIVE for bowel or bladder changes  Neuro: NEGATIVE for weakness, dizziness or paresthesias    OBJECTIVE:  /78   Ht 1.753 m (5' 9\")   BMI 36.87 kg/m     General: healthy, alert and in no distress  HEENT: no scleral icterus or conjunctival erythema  Skin: no suspicious lesions or rash. No jaundice.  CV: no pedal edema  Resp: normal respiratory effort without conversational dyspnea   Psych: normal mood and affect  Gait: normal steady gait with appropriate coordination and balance  Neuro: Normal light sensory exam of lower extremity  MSK:  LEFT KNEE  Inspection:    normal alignment  Palpation:    Tender about the medial joint line and pes anserine bursa. Remainder of bony and ligamentous landmarks are nontender.    Moderate effusion is present    Patellofemoral crepitus is Absent  Range of Motion:     00 extension to 1350 flexion  Strength:    Quadriceps 5/5, hamstrings 5/5, gastrocsoleus 5/5 and tibialis anterior 5/5    Extensor mechanism intact  Special Tests:    Positive: Patellar grind    Negative: MCL/valgus stress (0 & 30 deg), LCL/varus stress (0 & 30 deg), Lachman's, anterior drawer, posterior drawer, Michael's    Independent visualization of the below image:  Recent Results (from the past 24 hour(s))   XR Knee Standing AP Bilat [place] Bilat Lat Left    Narrative    KNEE STANDING AP BILATERAL, SUNRISE BILATERAL, LATERAL LEFT  8/31/2020  7:28 AM     HISTORY:  Acute pain of left knee.    COMPARISON: 10/25/2012 left knee " radiographs.      Impression    IMPRESSION:  1. Right: Minimal patellofemoral and medial compartment  osteoarthrosis. No lateral view.    2. Left: Minimal medial compartment and patellofemoral compartment  osteoarthrosis. No significant effusion. There is no evidence of  fracture or calcified intra-articular body. Mild progression of the  degenerative changes.       I  ordered, visualized and reviewed these images with the patient  Mild medial and PF joint line degeneration with small effusion  Large Joint Injection/Arthocentesis: L knee joint    Date/Time: 8/31/2020 7:45 AM  Performed by: Santana Manning MD  Authorized by: Santana Manning MD     Indications:  Pain and osteoarthritis  Needle Size:  18 G  Guidance: ultrasound    Approach:  Superolateral  Location:  Knee      Medications:  6 mg betamethasone acet & sod phos 6 (3-3) MG/ML; 3 mL ropivacaine 5 MG/ML  Medications comment:  Actual amount of ropivacaine used 4 mL  Aspirate amount (mL):  11  Aspirate:  Serous and yellow  Outcome:  Tolerated well, no immediate complications  Procedure discussed: discussed risks, benefits, and alternatives    Consent Given by:  Patient  Timeout: timeout called immediately prior to procedure    Prep: patient was prepped and draped in usual sterile fashion     Patient counseled on risks of infection related to steroids and COVID-19.  Patient reported significant improvement of pain after left knee aspiration and steroid injection.  Aftercare instructions given to patient.  Plan to follow-up as discussed above.     Santana Manning MD [facility]            Patient's conditions were thoroughly discussed during today's visit with greater than 50% of the visit spent counseling the patient with total time spent face-to-face with the patient being 30 minutes.    Santana Manning MD, [facility]      Again, thank you for allowing me to participate in the care of your  patient.        Sincerely,        Santana Manning MD

## 2020-08-31 NOTE — PATIENT INSTRUCTIONS
Diagnosis: Left knee pain likely due to osteoarthritis, less likely pes anserine bursitis  Image Findings: mild medial knee arthritis, mild under knee cap as well  Treatment: Left knee aspiration and steroid injection, start home exercises  Job: No restrictions  Medications: Limited tylenol/ibuprofen for pain for 1-2 weeks  Follow-up: as needed if not improved over the next month, sooner if worsening    Please call [phone]   Ask for my team if you have any questions or concerns    It was great seeing you  today!    Santana Manning    [facility] Injection Discharge Instructions    Procedure: Left knee aspiration and steroid injection      You may shower, however avoid swimming, tub baths or hot tubs for 24 hours following your procedure    You may have a mild to moderate increase in pain for several days following the injection.    It may take up to 14 days for the steroid medication to start working although you may feel the effect as early as a few days after the procedure.    You may use ice packs for 10-15 minutes, 3 to 4 times a day at the injection site for comfort    You may use anti-inflammatory medications (such as Ibuprofen or Aleve or Advil) or Tylenol for pain control if necessary    If you were fasting, you may resume your normal diet and medications after the procedure    If you have diabetes, check your blood sugar more frequently than usual as your blood sugar may be higher than normal for 10-14 days following a steroid injection. Contact your doctor who manages your diabetes if your blood sugar is higher than usual      If you experience any of the following, call [facility] @ [phone] or [phone]  -Fever over 100 degree F  -Swelling, bleeding, redness, drainage, warmth at the injection site  - New or worsening pain

## 2020-09-24 DIAGNOSIS — E03.9 ACQUIRED HYPOTHYROIDISM: ICD-10-CM

## 2020-09-24 LAB — TSH SERPL DL<=0.005 MIU/L-ACNC: 2 MU/L (ref 0.4–4)

## 2020-09-24 PROCEDURE — 36415 COLL VENOUS BLD VENIPUNCTURE: CPT | Performed by: FAMILY MEDICINE

## 2020-09-24 PROCEDURE — 84443 ASSAY THYROID STIM HORMONE: CPT | Performed by: FAMILY MEDICINE

## 2020-09-25 ENCOUNTER — MYC MEDICAL ADVICE (OUTPATIENT)
Dept: FAMILY MEDICINE | Facility: CLINIC | Age: 65
End: 2020-09-25

## 2020-09-25 DIAGNOSIS — E03.9 ACQUIRED HYPOTHYROIDISM: ICD-10-CM

## 2020-09-28 RX ORDER — LEVOTHYROXINE SODIUM 100 UG/1
100 TABLET ORAL DAILY
Qty: 90 TABLET | Refills: 3 | Status: SHIPPED | OUTPATIENT
Start: 2020-09-28 | End: 2021-03-29

## 2020-11-06 ENCOUNTER — MYC MEDICAL ADVICE (OUTPATIENT)
Dept: FAMILY MEDICINE | Facility: CLINIC | Age: 65
End: 2020-11-06

## 2020-11-09 ENCOUNTER — MYC MEDICAL ADVICE (OUTPATIENT)
Dept: FAMILY MEDICINE | Facility: CLINIC | Age: 65
End: 2020-11-09

## 2020-11-09 DIAGNOSIS — R09.81 CONGESTION OF PARANASAL SINUS: Primary | ICD-10-CM

## 2020-11-09 NOTE — TELEPHONE ENCOUNTER
11-9-20  Pt has sinusitis - will treat conservatively with netti pot, saline nasal spray  and fluticasone  - if symptoms greater than 10 days then add augmentin  Coleen Smith MD, PhD

## 2020-11-13 RX ORDER — FLUTICASONE PROPIONATE 50 MCG
1 SPRAY, SUSPENSION (ML) NASAL 2 TIMES DAILY
Qty: 16 G | Refills: 3 | Status: SHIPPED | OUTPATIENT
Start: 2020-11-13 | End: 2023-10-12

## 2020-12-11 ENCOUNTER — OFFICE VISIT (OUTPATIENT)
Dept: ORTHOPEDICS | Facility: CLINIC | Age: 65
End: 2020-12-11
Payer: COMMERCIAL

## 2020-12-11 DIAGNOSIS — M17.11 PRIMARY OSTEOARTHRITIS OF RIGHT KNEE: Primary | ICD-10-CM

## 2020-12-11 DIAGNOSIS — M25.561 ACUTE PAIN OF RIGHT KNEE: ICD-10-CM

## 2020-12-11 PROCEDURE — 99213 OFFICE O/P EST LOW 20 MIN: CPT | Mod: 25 | Performed by: FAMILY MEDICINE

## 2020-12-11 PROCEDURE — 20611 DRAIN/INJ JOINT/BURSA W/US: CPT | Mod: RT | Performed by: FAMILY MEDICINE

## 2020-12-11 RX ORDER — ROPIVACAINE HYDROCHLORIDE 5 MG/ML
3 INJECTION, SOLUTION EPIDURAL; INFILTRATION; PERINEURAL
Status: DISCONTINUED | OUTPATIENT
Start: 2020-12-11 | End: 2022-10-03

## 2020-12-11 RX ORDER — BETAMETHASONE SODIUM PHOSPHATE AND BETAMETHASONE ACETATE 3; 3 MG/ML; MG/ML
6 INJECTION, SUSPENSION INTRA-ARTICULAR; INTRALESIONAL; INTRAMUSCULAR; SOFT TISSUE
Status: DISCONTINUED | OUTPATIENT
Start: 2020-12-11 | End: 2022-10-03

## 2020-12-11 RX ADMIN — BETAMETHASONE SODIUM PHOSPHATE AND BETAMETHASONE ACETATE 6 MG: 3; 3 INJECTION, SUSPENSION INTRA-ARTICULAR; INTRALESIONAL; INTRAMUSCULAR; SOFT TISSUE at 15:38

## 2020-12-11 RX ADMIN — ROPIVACAINE HYDROCHLORIDE 3 ML: 5 INJECTION, SOLUTION EPIDURAL; INFILTRATION; PERINEURAL at 15:38

## 2020-12-11 NOTE — PATIENT INSTRUCTIONS
Diagnosis: Right knee osteoarthritis flare up  Image Findings: moderate right knee osteoarthritis  Treatment: Right knee aspiration/steroid injection, work on home exercises  Job: no restrictions   Medications: Limited tylenol/ibuprofen for pain for 1-2 weeks  Follow-up: as needed    Please call [phone]   Ask for my team if you have any questions or concerns    It was great seeing you again today!    Santana Manning MD, [facility] Discharge Instructions    Procedure: right knee aspiration/steroid injection      You may shower, however avoid swimming, tub baths or hot tubs for 24 hours following your procedure    You may have a mild to moderate increase in pain for several days following the injection.    It may take up to 14 days for the steroid medication to start working although you may feel the effect as early as a few days after the procedure.    You may use ice packs for 10-15 minutes, 3 to 4 times a day at the injection site for comfort    You may use anti-inflammatory medications (such as Ibuprofen or Aleve or Advil) or Tylenol for pain control if necessary    If you were fasting, you may resume your normal diet and medications after the procedure    If you have diabetes, check your blood sugar more frequently than usual as your blood sugar may be higher than normal for 10-14 days following a steroid injection. Contact your doctor who manages your diabetes if your blood sugar is higher than usual      If you experience any of the following, call [facility] @ [phone] or [phone]  -Fever over 100 degree F  -Swelling, bleeding, redness, drainage, warmth at the injection site  - New or worsening pain

## 2020-12-11 NOTE — LETTER
12/11/2020         RE: Elizabeth Treviño  [address]  [facility] [address]        Dear Colleague,    Thank you for referring your patient, Elizabeth Treviño, to the [facility]. Please see a copy of my visit note below.    ASSESSMENT & PLAN  Elizabeth was seen today for pain.    Diagnoses and all orders for this visit:    Primary osteoarthritis of right knee  -     Large Joint Injection/Arthocentesis: R knee joint    Acute pain of right knee  -     Large Joint Injection/Arthocentesis: R knee joint      Patient is a 65 year old female presenting for follow-up evaluation of   Chief Complaint   Patient presents with     Right Knee - Pain      # Acute Flare of Right Knee Osteoarthritis: Notable over the past 6 months but worse over the past couple of weeks with associated swelling diffusely in the knee as well as extending to the thigh and posterior calf.  Patient has significant is palpation over the medial joint line with some limitations range of motion secondary to pain and positive Michael sign.  Patient has has mild proximal calf pain with no foot swelling.  No history of DVTs no risk factors for DVTs on history.  Previous x-rays right medial  knee osteoarthrosis likely cause of her pain with effusion noted on ultrasound as well as moderate Baker's cyst.  Given this plan to treat as below follow-up as needed.  Patient was counseled on concerning signs and symptoms of a DVT and to follow-up with ER if these present.  Treatment: Activities as tolerated  Physical Therapy home exercises previously given, if not improved can refer for formal PT  Injection right knee aspiration and steroid injection with significant provement of pain afterwards  Medications  Limited NSAIDs/Tylenol    Concerning signs/sx that would warrant urgent evaluation were discussed.  All questions were answered, patient understands and agrees with plan.      Return if symptoms worsen or fail to  improve.    -----    SUBJECTIVE  Elizabeth Treviño is a/an 65 year old female who is seen in consultation at the request of  Coleen Smith M.D. for evaluation of right knee pain. The patient is seen by themselves.    Onset: 6 month(s) ago worsening over the last 2 weeks. Reports insidious onset without acute precipitating event.  Location of Pain: right thigh to calf pain, mostly in medial knee pain   Rating of Pain at worst: 28/10  Rating of Pain Currently: 6/10  Worsened by: sitting in chair or chair, lying with knee extended   Better with: elevation   Treatments tried: extra strength tylenol, aleve  Associated symptoms: swelling in lower legs, felt like right leg was cold    Orthopedic history: YES - Fell on side walk 4 years ago-did not have knee evaluated.   Relevant surgical history: NO  Past Medical History:   Diagnosis Date     Acquired hypothyroidism 9/26/2012     Problem list name updated by automated process. Provider to review     Early menopause     ovarian failure at age 36      Endometriosis      Osteopenia      PSVT (paroxysmal supraventricular tachycardia) (H) 9/26/2012     Social History     Socioeconomic History     Marital status:      Spouse name: Not on file     Number of children: Not on file     Years of education: Not on file     Highest education level: Not on file   Occupational History     Occupation: executive     Employer: [employer]   Social Needs     Financial resource strain: Not on file     Food insecurity     Worry: Not on file     Inability: Not on file     Transportation needs     Medical: Not on file     Non-medical: Not on file   Tobacco Use     Smoking status: Never Smoker     Smokeless tobacco: Never Used   Substance and Sexual Activity     Alcohol use: No     Drug use: No     Sexual activity: Not Currently     Partners: Male     Birth control/protection: Post-menopausal   Lifestyle     Physical activity     Days per week: 7 days     Minutes per session: 40 min      Stress: Only a little   Relationships     Social connections     Talks on phone: Not on file     Gets together: Not on file     Attends Cheondoism service: Not on file     Active member of club or organization: Not on file     Attends meetings of clubs or organizations: Not on file     Relationship status: Not on file     Intimate partner violence     Fear of current or ex partner: Not on file     Emotionally abused: Not on file     Physically abused: Not on file     Forced sexual activity: Not on file   Other Topics Concern     Parent/sibling w/ CABG, MI or angioplasty before 65F 55M? Not Asked   Social History Narrative    , adopted son,  is retired - working full time          Patient's past medical, surgical, social, and family histories were reviewed today and no changes are noted.  No family history pertinent to the patient's problem today     REVIEW OF SYSTEMS:  10 point ROS is negative other than symptoms noted above in HPI, Past Medical History or as stated below  Constitutional: NEGATIVE for fever, chills, change in weight  Skin: NEGATIVE for worrisome rashes, moles or lesions  GI/: NEGATIVE for bowel or bladder changes  Neuro: NEGATIVE for weakness, dizziness or paresthesias    OBJECTIVE:  There were no vitals taken for this visit.   General: healthy, alert and in no distress  HEENT: no scleral icterus or conjunctival erythema  Skin: no suspicious lesions or rash. No jaundice.  CV: no pedal edema  Resp: normal respiratory effort without conversational dyspnea   Psych: normal mood and affect  Gait: normal steady gait with appropriate coordination and balance  Neuro: Normal light sensory exam of lower extremity  MSK:  RIGHT KNEE  Inspection:    normal alignment  Palpation:    Tender about the medial tibial plateau. Remainder of bony and ligamentous landmarks are nontender.    Unable to tell if effusion is present.  Proximal calf swelling    Patellofemoral crepitus is Absent  Range of Motion:      00 extension to 1350 flexion  Strength:    Quadriceps 5/5, hamstrings 5/5, gastrocsoleus 5/5 and tibialis anterior 5/5    Extensor mechanism intact  Special Tests:    Positive: Patellar grind, Michael's    Negative: MCL/valgus stress (0 & 30 deg), LCL/varus stress (0 & 30 deg), Lachman's, anterior drawer, posterior drawer    Independent visualization of the below image:  No results found for this or any previous visit (from the past 24 hour(s)).  KNEE STANDING AP BILATERAL, SUNRISE BILATERAL, LATERAL LEFT  8/31/2020  7:28 AM      HISTORY:  Acute pain of left knee.     COMPARISON: 10/25/2012 left knee radiographs.                                                                      IMPRESSION:  1. Right: Minimal patellofemoral and medial compartment  osteoarthrosis. No lateral view.     2. Left: Minimal medial compartment and patellofemoral compartment  osteoarthrosis. No significant effusion. There is no evidence of  fracture or calcified intra-articular body. Mild progression of the  degenerative changes.     SHELTON HANSON MD  I  independently visualized and reviewed these images with the patient      Large Joint Injection/Arthocentesis: R knee joint    Date/Time: 12/11/2020 3:38 PM  Performed by: Santana Manning MD  Authorized by: Santana Manning MD     Indications:  Pain and osteoarthritis  Needle Size:  18 G  Guidance: ultrasound    Approach:  Superolateral  Location:  Knee      Medications:  6 mg betamethasone acet & sod phos 6 (3-3) MG/ML; 3 mL ropivacaine 5 MG/ML  Medications comment:  Actual amount of ropivacaine used 4 mL  Aspirate amount (mL):  17  Aspirate:  Serous and yellow  Outcome:  Tolerated well, no immediate complications  Procedure discussed: discussed risks, benefits, and alternatives    Consent Given by:  Patient  Timeout: timeout called immediately prior to procedure    Prep: patient was prepped and draped in usual sterile fashion     PaPatient reported significant improvement  of pain after right knee aspiration and steroid injection.  Aftercare instructions given to patient.  Plan to follow-up as discussed above.     Santana Manning MD [facility]               Santana Manning MD, [facility]        Again, thank you for allowing me to participate in the care of your patient.        Sincerely,        Santana Manning MD

## 2020-12-11 NOTE — PROGRESS NOTES
ASSESSMENT & PLAN  Elizabeth was seen today for pain.    Diagnoses and all orders for this visit:    Primary osteoarthritis of right knee  -     Large Joint Injection/Arthocentesis: R knee joint    Acute pain of right knee  -     Large Joint Injection/Arthocentesis: R knee joint      Patient is a 65 year old female presenting for follow-up evaluation of   Chief Complaint   Patient presents with     Right Knee - Pain      # Acute Flare of Right Knee Osteoarthritis: Notable over the past 6 months but worse over the past couple of weeks with associated swelling diffusely in the knee as well as extending to the thigh and posterior calf.  Patient has significant is palpation over the medial joint line with some limitations range of motion secondary to pain and positive Michael sign.  Patient has has mild proximal calf pain with no foot swelling.  No history of DVTs no risk factors for DVTs on history.  Previous x-rays right medial  knee osteoarthrosis likely cause of her pain with effusion noted on ultrasound as well as moderate Baker's cyst.  Given this plan to treat as below follow-up as needed.  Patient was counseled on concerning signs and symptoms of a DVT and to follow-up with ER if these present.  Treatment: Activities as tolerated  Physical Therapy home exercises previously given, if not improved can refer for formal PT  Injection right knee aspiration and steroid injection with significant provement of pain afterwards  Medications  Limited NSAIDs/Tylenol    Concerning signs/sx that would warrant urgent evaluation were discussed.  All questions were answered, patient understands and agrees with plan.      Return if symptoms worsen or fail to improve.    -----    SUBJECTIVE  Elizabeth Treviño is a/an 65 year old female who is seen in consultation at the request of  Coleen Smith M.D. for evaluation of right knee pain. The patient is seen by themselves.    Onset: 6 month(s) ago worsening over the last 2 weeks.  Reports insidious onset without acute precipitating event.  Location of Pain: right thigh to calf pain, mostly in medial knee pain   Rating of Pain at worst: 28/10  Rating of Pain Currently: 6/10  Worsened by: sitting in chair or chair, lying with knee extended   Better with: elevation   Treatments tried: extra strength tylenol, aleve  Associated symptoms: swelling in lower legs, felt like right leg was cold    Orthopedic history: YES - Fell on side walk 4 years ago-did not have knee evaluated.   Relevant surgical history: NO  Past Medical History:   Diagnosis Date     Acquired hypothyroidism 9/26/2012     Problem list name updated by automated process. Provider to review     Early menopause     ovarian failure at age 36      Endometriosis      Osteopenia      PSVT (paroxysmal supraventricular tachycardia) (H) 9/26/2012     Social History     Socioeconomic History     Marital status:      Spouse name: Not on file     Number of children: Not on file     Years of education: Not on file     Highest education level: Not on file   Occupational History     Occupation: executive     Employer: [employer]   Social Needs     Financial resource strain: Not on file     Food insecurity     Worry: Not on file     Inability: Not on file     Transportation needs     Medical: Not on file     Non-medical: Not on file   Tobacco Use     Smoking status: Never Smoker     Smokeless tobacco: Never Used   Substance and Sexual Activity     Alcohol use: No     Drug use: No     Sexual activity: Not Currently     Partners: Male     Birth control/protection: Post-menopausal   Lifestyle     Physical activity     Days per week: 7 days     Minutes per session: 40 min     Stress: Only a little   Relationships     Social connections     Talks on phone: Not on file     Gets together: Not on file     Attends Latter day service: Not on file     Active member of club or organization: Not on file     Attends meetings of clubs or organizations: Not on  file     Relationship status: Not on file     Intimate partner violence     Fear of current or ex partner: Not on file     Emotionally abused: Not on file     Physically abused: Not on file     Forced sexual activity: Not on file   Other Topics Concern     Parent/sibling w/ CABG, MI or angioplasty before 65F 55M? Not Asked   Social History Narrative    , adopted son,  is retired - working full time          Patient's past medical, surgical, social, and family histories were reviewed today and no changes are noted.  No family history pertinent to the patient's problem today     REVIEW OF SYSTEMS:  10 point ROS is negative other than symptoms noted above in HPI, Past Medical History or as stated below  Constitutional: NEGATIVE for fever, chills, change in weight  Skin: NEGATIVE for worrisome rashes, moles or lesions  GI/: NEGATIVE for bowel or bladder changes  Neuro: NEGATIVE for weakness, dizziness or paresthesias    OBJECTIVE:  There were no vitals taken for this visit.   General: healthy, alert and in no distress  HEENT: no scleral icterus or conjunctival erythema  Skin: no suspicious lesions or rash. No jaundice.  CV: no pedal edema  Resp: normal respiratory effort without conversational dyspnea   Psych: normal mood and affect  Gait: normal steady gait with appropriate coordination and balance  Neuro: Normal light sensory exam of lower extremity  MSK:  RIGHT KNEE  Inspection:    normal alignment  Palpation:    Tender about the medial tibial plateau. Remainder of bony and ligamentous landmarks are nontender.    Unable to tell if effusion is present.  Proximal calf swelling    Patellofemoral crepitus is Absent  Range of Motion:     00 extension to 1350 flexion  Strength:    Quadriceps 5/5, hamstrings 5/5, gastrocsoleus 5/5 and tibialis anterior 5/5    Extensor mechanism intact  Special Tests:    Positive: Patellar grind, Michael's    Negative: MCL/valgus stress (0 & 30 deg), LCL/varus stress (0 & 30  deg), Lachman's, anterior drawer, posterior drawer    Independent visualization of the below image:  No results found for this or any previous visit (from the past 24 hour(s)).  KNEE STANDING AP BILATERAL, SUNRISE BILATERAL, LATERAL LEFT  8/31/2020  7:28 AM      HISTORY:  Acute pain of left knee.     COMPARISON: 10/25/2012 left knee radiographs.                                                                      IMPRESSION:  1. Right: Minimal patellofemoral and medial compartment  osteoarthrosis. No lateral view.     2. Left: Minimal medial compartment and patellofemoral compartment  osteoarthrosis. No significant effusion. There is no evidence of  fracture or calcified intra-articular body. Mild progression of the  degenerative changes.     SHELTON HANSON MD  I  independently visualized and reviewed these images with the patient      Large Joint Injection/Arthocentesis: R knee joint    Date/Time: 12/11/2020 3:38 PM  Performed by: Santana Manning MD  Authorized by: Santana Manning MD     Indications:  Pain and osteoarthritis  Needle Size:  18 G  Guidance: ultrasound    Approach:  Superolateral  Location:  Knee      Medications:  6 mg betamethasone acet & sod phos 6 (3-3) MG/ML; 3 mL ropivacaine 5 MG/ML  Medications comment:  Actual amount of ropivacaine used 4 mL  Aspirate amount (mL):  17  Aspirate:  Serous and yellow  Outcome:  Tolerated well, no immediate complications  Procedure discussed: discussed risks, benefits, and alternatives    Consent Given by:  Patient  Timeout: timeout called immediately prior to procedure    Prep: patient was prepped and draped in usual sterile fashion     PaPatient reported significant improvement of pain after right knee aspiration and steroid injection.  Aftercare instructions given to patient.  Plan to follow-up as discussed above.     Santana Manning MD [facility]               Santana Manning MD, [facility]

## 2020-12-15 ENCOUNTER — MYC MEDICAL ADVICE (OUTPATIENT)
Dept: FAMILY MEDICINE | Facility: CLINIC | Age: 65
End: 2020-12-15

## 2020-12-15 DIAGNOSIS — E78.5 HYPERLIPIDEMIA LDL GOAL <100: Primary | ICD-10-CM

## 2020-12-15 DIAGNOSIS — Z13.1 SCREENING FOR DIABETES MELLITUS: ICD-10-CM

## 2020-12-15 DIAGNOSIS — M85.89 OSTEOPENIA OF MULTIPLE SITES: ICD-10-CM

## 2020-12-17 NOTE — TELEPHONE ENCOUNTER
12/17/20  Ordered lipid, BMP and will see pt in spring. Also ordered a DXA   Coleen Smith MD, PhD

## 2020-12-18 DIAGNOSIS — Z13.1 SCREENING FOR DIABETES MELLITUS: ICD-10-CM

## 2020-12-18 DIAGNOSIS — E78.5 HYPERLIPIDEMIA LDL GOAL <100: ICD-10-CM

## 2020-12-18 LAB
ANION GAP SERPL CALCULATED.3IONS-SCNC: 8 MMOL/L (ref 3–14)
BUN SERPL-MCNC: 20 MG/DL (ref 7–30)
CALCIUM SERPL-MCNC: 9.2 MG/DL (ref 8.5–10.1)
CHLORIDE SERPL-SCNC: 102 MMOL/L (ref 94–109)
CHOLEST SERPL-MCNC: 189 MG/DL
CO2 SERPL-SCNC: 27 MMOL/L (ref 20–32)
CREAT SERPL-MCNC: 0.84 MG/DL (ref 0.52–1.04)
GFR SERPL CREATININE-BSD FRML MDRD: 73 ML/MIN/{1.73_M2}
GLUCOSE SERPL-MCNC: 89 MG/DL (ref 70–99)
HDLC SERPL-MCNC: 92 MG/DL
LDLC SERPL CALC-MCNC: 81 MG/DL
NONHDLC SERPL-MCNC: 97 MG/DL
POTASSIUM SERPL-SCNC: 3.8 MMOL/L (ref 3.4–5.3)
SODIUM SERPL-SCNC: 137 MMOL/L (ref 133–144)
TRIGL SERPL-MCNC: 78 MG/DL

## 2020-12-18 PROCEDURE — 80061 LIPID PANEL: CPT | Performed by: FAMILY MEDICINE

## 2020-12-18 PROCEDURE — 36415 COLL VENOUS BLD VENIPUNCTURE: CPT | Performed by: FAMILY MEDICINE

## 2020-12-18 PROCEDURE — 80048 BASIC METABOLIC PNL TOTAL CA: CPT | Performed by: FAMILY MEDICINE

## 2021-01-13 ENCOUNTER — MYC MEDICAL ADVICE (OUTPATIENT)
Dept: FAMILY MEDICINE | Facility: CLINIC | Age: 66
End: 2021-01-13

## 2021-01-14 ENCOUNTER — HEALTH MAINTENANCE LETTER (OUTPATIENT)
Age: 66
End: 2021-01-14

## 2021-01-16 ENCOUNTER — MYC MEDICAL ADVICE (OUTPATIENT)
Dept: FAMILY MEDICINE | Facility: CLINIC | Age: 66
End: 2021-01-16

## 2021-02-18 ENCOUNTER — MYC MEDICAL ADVICE (OUTPATIENT)
Dept: FAMILY MEDICINE | Facility: CLINIC | Age: 66
End: 2021-02-18

## 2021-03-26 DIAGNOSIS — E03.9 ACQUIRED HYPOTHYROIDISM: ICD-10-CM

## 2021-03-29 RX ORDER — LEVOTHYROXINE SODIUM 100 UG/1
100 TABLET ORAL DAILY
Qty: 90 TABLET | Refills: 3 | Status: SHIPPED | OUTPATIENT
Start: 2021-03-29 | End: 2022-04-22

## 2021-03-31 ENCOUNTER — MYC MEDICAL ADVICE (OUTPATIENT)
Dept: FAMILY MEDICINE | Facility: CLINIC | Age: 66
End: 2021-03-31

## 2021-04-22 DIAGNOSIS — E78.2 MIXED HYPERLIPIDEMIA: ICD-10-CM

## 2021-04-22 RX ORDER — ROSUVASTATIN CALCIUM 5 MG/1
5 TABLET, COATED ORAL DAILY
Qty: 90 TABLET | Refills: 1 | Status: SHIPPED | OUTPATIENT
Start: 2021-04-22 | End: 2021-11-23

## 2021-07-08 NOTE — ED PROVIDER NOTES
History     Chief Complaint   Patient presents with     Laceration     HPI  Elizabeth Treviño is a 61 year old female, past medical history is significant for hypothyroidism, osteoporosis, PSVT, seborrheic keratosis, presents to the urgent care with concerns of laceration sustained to the right hand just prior to arrival.  The patient tripped at home sliding on the floor with her right hand into the corner of a wall at opening a laceration in the 4th web space.  Direct pressure was applied and the patient came to the urgent care.  Her tetanus is up-to-date by her account.  She notes no tingling or numbness.     Active Ambulatory Problems     Diagnosis Date Noted     Acquired hypothyroidism 09/26/2012     Easy bruising 09/26/2012     Osteoporosis 09/26/2012     PSVT (paroxysmal supraventricular tachycardia) (H) 09/26/2012     Abnormal weight gain 09/26/2012     Pigmented skin lesion 01/08/2013     SK (seborrheic keratosis) 01/08/2013     Seborrheic dermatitis 01/08/2013     Resolved Ambulatory Problems     Diagnosis Date Noted     No Resolved Ambulatory Problems     Past Medical History   Diagnosis Date     Osteoporosis, unspecified 9/26/2012     History reviewed. No pertinent past surgical history.  Social History     Social History     Marital Status:      Spouse Name: N/A     Number of Children: N/A     Years of Education: N/A     Occupational History     Not on file.     Social History Main Topics     Smoking status: Never Smoker      Smokeless tobacco: Never Used     Alcohol Use: No     Drug Use: No     Sexual Activity: Not on file     Other Topics Concern     Not on file     Social History Narrative     Family History   Problem Relation Age of Onset     Hypertension Mother      CEREBROVASCULAR DISEASE Mother      Macular Degeneration Mother      Hypertension Sister      CANCER Father      Lung cancer     Respiratory Father      COPD     CANCER Brother      Lung cancer     Amblyopia No family hx of        [address] [facility]      PROGRESS NOTE        Patient:  Severino Merritt  YOB: 1948    MRN: [id number]     Acct: [de-identified]     Admit date: 7/3/2021    Pt seen and Chart reviewed. Consultant notes reviewed and care evaluated. Subjective: Patient is doing okay today. This morning he says the pain is okay so far. He is eating breakfast his appetite is back he said is going down with no problems. Looks like some issue with the biopsy he was told me he did not get enough tissue but there he can do PET scan outpatient seen by urology no plan for stenting. Patient is okay to go home follow with his PCP as well is hematologist for further treatment we will switch him to [address] [place] [address] before discharge we will give him on Lyrica for now for his herpetic symptoms. Diet:  Adult Oral Nutrition Supplement; Clear Liquid Oral Supplement  ADULT DIET;  Regular; 5 carb choices (75 gm/meal)      Medications:Current Inpatient    Scheduled Meds:   amLODIPine  10 mg Oral Daily    acyclovir  800 mg Oral 5x Daily    insulin lispro  0-6 Units Subcutaneous TID WC    insulin lispro  0-3 Units Subcutaneous Nightly    pregabalin  50 mg Oral Nightly    sodium chloride flush  5-40 mL Intravenous 2 times per day    enoxaparin  40 mg Subcutaneous Daily    levothyroxine  50 mcg Oral Daily    memantine  10 mg Oral BID    omega-3 acid ethyl esters  1 g Oral BID    atorvastatin  20 mg Oral Daily    lidocaine  1 patch Transdermal Daily    [Held by provider] glyBURIDE  5 mg Oral BID WC    And    [Held by provider] metFORMIN  500 mg Oral BID WC     Continuous Infusions:   sodium chloride      sodium chloride 75 mL/hr at 07/07/21 2317    dextrose       PRN Meds:traMADol, acetaminophen, sodium chloride flush, sodium chloride, ondansetron **OR** ondansetron, HYDROmorphone, glucose, dextrose, glucagon (rDNA), dextrose        Physical Exam:  Vitals: BP (!) 196/77   Pulse 82 Retinal detachment No family hx of      Glaucoma No family hx of      No current facility-administered medications for this encounter.     Current Outpatient Prescriptions   Medication     rosuvastatin (CRESTOR) 5 MG tablet     atorvastatin (LIPITOR) 10 MG tablet     levothyroxine (SYNTHROID, LEVOTHROID) 88 MCG tablet     VALTREX 500 MG tablet     Calcium Carbonate-Vitamin D (CALCIUM + D PO)     FISH OIL     B Complex Vitamins (B COMPLEX 1 PO)      No Known Allergies    I have reviewed the Medications, Allergies, Past Medical and Surgical History, and Social History in the Epic system.    Review of Systems   Constitutional: Negative.    Musculoskeletal:        4th web space right hand laceration       Physical Exam   BP: (!) 180/104 mmHg  Heart Rate: 60  Temp: 97.9  F (36.6  C)  Resp: 18  SpO2: 98 %  Physical Exam   Constitutional: She appears well-developed and well-nourished.   Musculoskeletal:   There is a 5 cm laceration in the 4th website that begins on the dorsum of the hand and extends by about 1 cm onto the volar aspect of the hand.  Slow ooze of blood.  No arterial bleeding.  Sensation is intact in the 4th and 5th digits of the right hand.  There is no functional deficit to flexion or extension.   Nursing note and vitals reviewed.      ED Course   Procedures          7:20 PM  Procedure note:  With verbal consent from the patient 1% lidocaine without epinephrine a total of 10 cc was injected subcutaneously to the wound margins.  The area was then carefully inspected under direct vision and cleaned with Hibiclens and saline.  Simple interrupted closure with 6 4-0 nylon sutures was performed without difficulty and a dressing applied.    Critical Care time:  none               Labs Ordered and Resulted from Time of ED Arrival Up to the Time of Departure from the ED - No data to display    Assessments & Plan (with Medical Decision Making)   61-year-old female who presents with concerns of laceration to her right  Temp 98.3 °F (36.8 °C) (Oral)   Resp 16   Ht 5' 10.5\" (1.791 m)   Wt 194 lb 7.1 oz (88.2 kg)   SpO2 92%   BMI 27.51 kg/m²   24 hour intake/output:    Intake/Output Summary (Last 24 hours) at 7/8/2021 0823  Last data filed at 7/8/2021 0631  Gross per 24 hour   Intake 2608.75 ml   Output 575 ml   Net 2033.75 ml     Last 3 weights: Wt Readings from Last 3 Encounters:   07/04/21 194 lb 7.1 oz (88.2 kg)   12/16/19 193 lb (87.5 kg)   09/06/13 212 lb (96.2 kg)       Physical Examination:   General appearance - alert, well appearing, and in no distress  Mental status - alert, oriented to person, place, and time  PERRLA wnl  Chest - clear to auscultation, no wheezes, rales or rhonchi, symmetric air entry  Heart - normal rate, regular rhythm, normal S1, S2, no murmurs, rubs, clicks or gallops  Abdomen - soft, nontender, nondistended, no masses or organomegaly right flank lower abdomen he still have vesicular lesions but they look like this started slowly going away. No crusting yet.   He has the lidocaine patch on  Neurological - alert, oriented, normal speech, no focal findings or movement disorder noted}  Extremities - peripheral pulses normal, no pedal edema, no clubbing or cyanosis  Skin - normal coloration and turgor, no rashes, no suspicious skin lesions noted     POC Glucose Fingerstick [[id number]]    Collected: 07/08/21 0602    Updated: 07/08/21 0608     POC Glucose 110 mg/dL   Basic Metabolic Prof [[id number]] (Abnormal)    Collected: 07/08/21 0535    Updated: 07/08/21 0608    Specimen Source: Blood     Glucose 116High  mg/dL    BUN 21 mg/dL    CREATININE 1.04 mg/dL    Bun/Cre Ratio NOT REPORTED    Calcium 8.2Low  mg/dL    Sodium 138 mmol/L    Potassium 4.3 mmol/L    Chloride 104 mmol/L    CO2 25 mmol/L    Anion Gap 9 mmol/L    GFR Non-African American >60 mL/min    GFR African American >60 mL/min    GFR Comment         Comment: Average GFR for 79or more years old:    65 mL/min/1.73sq m   Chronic Kidney Disease:    <60 mL/min/1.73sq m   Kidney failure:    <15 mL/min/1.73sq m               eGFR calculated using average adult body mass.  Additional eGFR calculator available at:         Streamline.br              GFR Staging NOT REPORTED   CBC with DIFF [[id number]] (Abnormal)    Collected: 07/08/21 0535    Updated: 07/08/21 0545    Specimen Source: Blood     WBC 6.6 k/uL    RBC 3.95Low  m/uL    Hemoglobin 11.3Low  g/dL    Hematocrit 33.8Low  %    MCV 85.5 fL    MCH 28.6 pg    MCHC 33.5 g/dL    RDW 14.9 %    Platelets 373 k/uL    MPV 7.9 fL    NRBC Automated NOT REPORTED per 100 WBC    Differential Type NOT REPORTED    Seg Neutrophils 66 %    Lymphocytes 20Low  %    Monocytes 11High  %    Eosinophils % 3 %    Basophils 0 %    Immature Granulocytes NOT REPORTED %    Segs Absolute 4.40 k/uL    Absolute Lymph # 1.30 k/uL    Absolute Mono # 0.70 k/uL    Absolute Eos # 0.20 k/uL    Basophils Absolute 0.00 k/uL    Absolute Immature Granulocyte NOT REPORTED k/uL    WBC Morphology NOT REPORTED    RBC Morphology NOT REPORTED    Platelet Estimate NOT REPORTED   POC Glucose Fingerstick [[id number]] (Abnormal)    Collected: 07/07/21 2016    Updated: 07/07/21 2022     POC Glucose 179High  mg/dL   POC Glucose Fingerstick [[id number]]    Collected: 07/07/21 1554    Updated: 07/07/21 1601     POC Glucose 79 mg/dL   Surgical Pathology [[id number]]    Collected: 07/03/21 1435    Updated: 07/07/21 [address]     Surgical Pathology Report --    [id number]   [facility]   [facility]   ANATOMIC PATHOLOGY   [address], [address] [place], [address]   [phone]   Fax: [phone]   SURGICAL PATHOLOGY CONSULTATION     Patient Name: Maya Denise   MR#: [id number]   Specimen #[id number]     Procedures/Addenda   FLOW CYTOMETRY REPORT     Date Ordered:     7/7/2021     Status:   Signed Out        Date Complete:     7/7/2021     By: Noah Levine M.D.        Date Reported:   hand as described in HPI.  Physical exam finds a 5 cm 4th right hand web space laceration which is closed primarily without difficulty in the emergency department as detailed procedure note above.  Recommend removal of sutures with primary care provider or an urgent care in 10-14 days.  Return if signs of infection.      Disclaimer: This note consists of symbols derived from keyboarding, dictation and/or voice recognition software. As a result, there may be errors in the script that have gone undetected. Please consider this when interpreting information found in this chart.      I have reviewed the nursing notes.    I have reviewed the findings, diagnosis, plan and need for follow up with the patient.    New Prescriptions    No medications on file       Final diagnoses:   Hand laceration, right, initial encounter - 4th webspace laceration; 5 cm       1/9/2017   [facility] EMERGENCY DEPARTMENT      Osiel Allison MD  01/09/17 1925     7/7/2021       INTERPRETATION     Retroperitoneal mass biopsy for flow cytometry:          Low cellularity/low viability sample precludes analysis. RESULTS-COMMENTS     Sample type:  RP Mass Biopsy     Flow cytometric immunophenotyping analysis is attempted on RP mass   biopsy following rbc lysis procedure.  The cells are labeled by direct   ten color immunostaining procedure and analyzed on a Navios flow   cytometer. Viability:  65%     Note: Low cellularity sample, flow cytometry cannot be performed.       Silvina Lorenz M.D.   [place]                  Source:   1: RP MASS BIOPSY FOR FLOW CYTOMETRY    IR BIOPSY ABDOMINAL/RETROPERITONEAL MASS PERCUTANEOUS [[id number]]    Collected: 07/06/21 1316    Updated: 07/07/21 1131    Narrative:     PROCEDURE:   ULTRASOUND-GUIDED RETROPERITONEAL MASS BIOPSY     7/6/2021     HISTORY:   ORDERING SYSTEM PROVIDED HISTORY: Biopsy of MD mass seen on CT scan, send for   cytology and flow Cytometry   TECHNOLOGIST PROVIDED HISTORY:   Biopsy of MD mass seen on CT scan, send for cytology and flow Cytometry     CONTRAST:   None. SEDATION:   None. FLUOROSCOPY DOSE AND TYPE OR TIME AND EXPOSURES:   None.      DESCRIPTION OF PROCEDURE AND FINDINGS:   This procedure was performed by Dr. Rickie Hirsch.  Informed consent was obtained   after a detailed explanation of the procedure including risks, benefits, and   alternatives.  Universal protocol was observed.  Targeted ultrasound of the   left flank was performed and shows a heterogeneous hypoechoic mass by the   inferior pole of the left kidney corresponding to the mass described on the   recent CT and MRI studies.  The skin over the area was prepped and draped in   standard sterile fashion.  1% lidocaine used for local anesthesia.  Using   real-time ultrasound guidance an 25 gauge M biopsy needle was advanced into   the lesion.  Ultrasound images show a safe tract and access into the mass on   all occasions.  Five core biopsy vascular                          Plan:  35. Discharge patient home and have him follow-up with his PCP and hematologist.  34.   35. He is to call if any problems  36.   37. We will DC Ultram from [address] [facility] to go home with as well as Lyrica for now  38.   39.  To call if any problems    Rodo Cardozo DO FAAFP            7/8/2021, 8:23 AM

## 2021-07-29 ENCOUNTER — ANCILLARY PROCEDURE (OUTPATIENT)
Dept: GENERAL RADIOLOGY | Facility: CLINIC | Age: 66
End: 2021-07-29
Attending: NURSE PRACTITIONER
Payer: MEDICARE

## 2021-07-29 ENCOUNTER — OFFICE VISIT (OUTPATIENT)
Dept: URGENT CARE | Facility: URGENT CARE | Age: 66
End: 2021-07-29
Payer: MEDICARE

## 2021-07-29 VITALS
DIASTOLIC BLOOD PRESSURE: 74 MMHG | BODY MASS INDEX: 35.16 KG/M2 | RESPIRATION RATE: 16 BRPM | TEMPERATURE: 96.9 F | OXYGEN SATURATION: 100 % | SYSTOLIC BLOOD PRESSURE: 124 MMHG | HEIGHT: 69 IN | WEIGHT: 237.4 LBS | HEART RATE: 62 BPM

## 2021-07-29 DIAGNOSIS — S69.91XA HAND INJURY, RIGHT, INITIAL ENCOUNTER: ICD-10-CM

## 2021-07-29 DIAGNOSIS — S60.221A CONTUSION OF RIGHT HAND, INITIAL ENCOUNTER: Primary | ICD-10-CM

## 2021-07-29 PROCEDURE — 99214 OFFICE O/P EST MOD 30 MIN: CPT | Performed by: NURSE PRACTITIONER

## 2021-07-29 PROCEDURE — 73130 X-RAY EXAM OF HAND: CPT | Mod: RT | Performed by: RADIOLOGY

## 2021-07-29 ASSESSMENT — MIFFLIN-ST. JEOR: SCORE: 1681.22

## 2021-07-29 NOTE — PATIENT INSTRUCTIONS
Increase rest and fluids. Tylenol and/or Ibuprofen for comfort.  If your symptoms worsen or do not resolve follow up with your primary care provider in 1 week and sooner if needed.      RICE advice   Indications for emergent return to emergency department discussed with patient, who verbalized good understanding and agreement.  Patient understands the limitations of today's evaluation.

## 2021-07-29 NOTE — PROGRESS NOTES
"    Assessment & Plan  relieved to know there is no fracture. Does not wish to wait for AVS as she needs to  grandchildren. Very grateful for seeing her in a timely fashion.  Problem List Items Addressed This Visit     None      Visit Diagnoses     Contusion of right hand, initial encounter    -  Primary    Hand injury, right, initial encounter        Relevant Orders    XR Hand Right G/E 3 Views (Completed)             15 minutes spent on the date of the encounter doing chart review, history and exam, documentation and further activities per the note       BMI:   Estimated body mass index is 35.06 kg/m  as calculated from the following:    Height as of this encounter: 1.753 m (5' 9\").    Weight as of this encounter: 107.7 kg (237 lb 6.4 oz).   Weight management plan: Patient was referred to their PCP to discuss a diet and exercise plan.    Patient Instructions   Increase rest and fluids. Tylenol and/or Ibuprofen for comfort.  If your symptoms worsen or do not resolve follow up with your primary care provider in 1 week and sooner if needed.      RICE advice   Indications for emergent return to emergency department discussed with patient, who verbalized good understanding and agreement.  Patient understands the limitations of today's evaluation.               No follow-ups on file.    Sole Asencio, SAY CNP  [facility] [place]    Hiro Johnson is a 66 year old who presents for the following health issues     HPI   Chief Complaint   Patient presents with     Musculoskeletal Problem     Today 7/29/21 Patient slammed right hand in the car door,palm of hand all the way around to top od knuckles is discolored, has osteopina, door didn't shut all the way.             Review of Systems   Constitutional, HEENT, cardiovascular, pulmonary, GI, , musculoskeletal, neuro, skin, endocrine and psych systems are negative, except as otherwise noted.      Objective    /74 (BP " "Location: Right arm, Patient Position: Sitting, Cuff Size: Adult Large)   Pulse 62   Temp 96.9  F (36.1  C) (Tympanic)   Resp 16   Ht 1.753 m (5' 9\")   Wt 107.7 kg (237 lb 6.4 oz)   SpO2 100%   BMI 35.06 kg/m    Body mass index is 35.06 kg/m .  Physical Exam   GENERAL: healthy, alert and no distress, nontoxic in appearance  EYES: Eyes grossly normal to inspection, PERRL and conjunctivae and sclerae normal  HENT: normocephalic and atraumatic  NECK: supple with full ROM  MS: no gross musculoskeletal defects noted, Top of right hand has mild swelling over center with bruising. Skin intact. Full ROM. Fingers not involved. Normal circulation and sensation.    No results found for this or any previous visit (from the past 24 hour(s)).    No fracture seen. Will await overread      XR HAND RIGHT G/E 3 VIEWS 7/29/2021 2:15 PM      HISTORY: Hand injury, right, initial encounter                                                                      IMPRESSION: Negative exam.     TRACE SAENZ MD        "

## 2021-09-06 ENCOUNTER — MYC MEDICAL ADVICE (OUTPATIENT)
Dept: FAMILY MEDICINE | Facility: CLINIC | Age: 66
End: 2021-09-06

## 2021-09-14 ENCOUNTER — OFFICE VISIT (OUTPATIENT)
Dept: FAMILY MEDICINE | Facility: CLINIC | Age: 66
End: 2021-09-14
Payer: MEDICARE

## 2021-09-14 VITALS
OXYGEN SATURATION: 99 % | RESPIRATION RATE: 16 BRPM | SYSTOLIC BLOOD PRESSURE: 116 MMHG | BODY MASS INDEX: 34.07 KG/M2 | TEMPERATURE: 98.2 F | HEIGHT: 69 IN | HEART RATE: 75 BPM | DIASTOLIC BLOOD PRESSURE: 72 MMHG | WEIGHT: 230 LBS

## 2021-09-14 DIAGNOSIS — I47.10 PSVT (PAROXYSMAL SUPRAVENTRICULAR TACHYCARDIA) (H): ICD-10-CM

## 2021-09-14 DIAGNOSIS — Z12.31 ENCOUNTER FOR SCREENING MAMMOGRAM FOR BREAST CANCER: ICD-10-CM

## 2021-09-14 DIAGNOSIS — E03.9 ACQUIRED HYPOTHYROIDISM: ICD-10-CM

## 2021-09-14 DIAGNOSIS — Z13.220 LIPID SCREENING: ICD-10-CM

## 2021-09-14 DIAGNOSIS — Z76.89 ENCOUNTER TO ESTABLISH CARE: Primary | ICD-10-CM

## 2021-09-14 DIAGNOSIS — Z12.12 SCREENING FOR MALIGNANT NEOPLASM OF THE RECTUM: ICD-10-CM

## 2021-09-14 DIAGNOSIS — L57.0 ACTINIC KERATOSIS: ICD-10-CM

## 2021-09-14 PROCEDURE — 99213 OFFICE O/P EST LOW 20 MIN: CPT | Performed by: FAMILY MEDICINE

## 2021-09-14 ASSESSMENT — MIFFLIN-ST. JEOR: SCORE: 1647.65

## 2021-09-14 NOTE — PROGRESS NOTES
Assessment & Plan     Encounter to establish care  66 yr old female here to establish care. She has hypothyroidism and is on synthroid ( brand name only ). She is doing well overall. Patient reminded of colon cancer screening and also mammogram.     Screening for malignant neoplasm of the rectum  Patient will be notified of results.  - Fecal colorectal cancer screen (FIT); Future    Encounter for screening mammogram for breast cancer  Mammogram ordered.   - MA Screen Bilateral w/Flaco; Future    Acquired hypothyroidism  Recommend labs , patient will return for this.  TSH   Date Value Ref Range Status   09/24/2020 2.00 0.40 - 4.00 mU/L Final   Last labs appeared within normal limits.   - TSH with free T4 reflex; Future  - Basic metabolic panel  (Ca, Cl, CO2, Creat, Gluc, K, Na, BUN); Future    Lipid screening  Patient will be notified of results  - Lipid panel reflex to direct LDL Fasting; Future    Actinic keratosis  Patient reassured, asked to keep a close eye.        FUTURE APPOINTMENTS:       - Follow-up office or E-visit in one month .    Return in about 4 weeks (around 10/12/2021) for Follow up.    Juvencio Adame MD  [facility] is a 66 year old who presents for the following health issues  accompanied by herself:    HPI     66 yr old female here to establish care. She has a past medical history significant for hypothyroidism. She takes Synthroid for this. She reports that when she takes the generic she gets burning mouth feeling. This recently the medication was switched to the brand name. This appears to be getting.  She also mentioned that she has been dealing with trigger finger in both hands ( middle fingers) especially the right hand. She says it is uncomfortable but she does not feel like she needs this corrected surgically yet.   She also had a skin lesion on her left elbow. She wanted this looked out.     Chief Complaint   Patient presents with  "    Establish Care     Here to establish care.     Trigger finger     She has been dealing with trigger finger for both middle fingers.  This has been for a couple of weeks.  The left side is worse in the am and will get better as the day goes on.  For the right hand she crushed her hand in a car door about 2 weeks ago.  This takes longer to improve as the day goes on.  Wanting to know what her options are.     Derm Problem     She would like a mole the left elbow area checked.  It was dry and cracked open this past week.  There was some bleeding.  Now it is smooth.     Burning mouth     She has burning mouth.  This happens when her thyroid medication gets changed to a generic brand.  There are some lesions behind the bottom back teeth.  Nothing hurts.  She has now been back on the Levothyroxine for about one month.  This happened due to changing her Phramacy.  She just wants you to be aware.     Imm/Inj     Wanting to know if she can get the Flu and Pneumonia injection here or if she should go to a Pharmacy.  Wanting to know about the Covid booster injection.     Blood Draw     She is not fasting today.  Is wanting to know if she is due to have thyroid levels checked today.     Health Maintenance     She will do the FIT test.  Mammogram order is placed.           Review of Systems   Constitutional, HEENT, cardiovascular, pulmonary, gi and gu systems are negative, except as otherwise noted.      Objective    /72   Pulse 75   Temp 98.2  F (36.8  C) (Tympanic)   Resp 16   Ht 1.753 m (5' 9\")   Wt 104.3 kg (230 lb)   SpO2 99%   BMI 33.97 kg/m    Body mass index is 33.97 kg/m .  Physical Exam   GENERAL: healthy, alert and no distress  EYES: Eyes grossly normal to inspection, PERRL and conjunctivae and sclerae normal  HENT: ear canals and TM's normal, nose and mouth without ulcers or lesions  NECK: no adenopathy, no asymmetry, masses, or scars and thyroid normal to palpation  RESP: lungs clear to auscultation " - no rales, rhonchi or wheezes  CV: regular rate and rhythm, normal S1 S2, no S3 or S4, no murmur, click or rub, no peripheral edema and peripheral pulses strong  ABDOMEN: soft, nontender, no hepatosplenomegaly, no masses and bowel sounds normal  MS: no gross musculoskeletal defects noted, no edema

## 2021-09-14 NOTE — PATIENT INSTRUCTIONS
Thank you for choosing [facility].  You may be receiving an email and/or telephone survey request from [facility] Customer Experience regarding your visit today.  Please take a few minutes to respond to the survey to let us know how we are doing.      If you have questions or concerns, please contact us via QWiPS or you can contact your care team at [phone] option 2.    Our Clinic hours are:  Monday - Thursday 7am-6pm  Friday 7am-5pm    The [place] outpatient lab hours are:  Monday - Friday 7am-4:30pm    Appointments are required, call [phone]    If you have clinical questions after hours or would like to schedule an appointment,  call the clinic at [phone].

## 2021-10-23 ENCOUNTER — HEALTH MAINTENANCE LETTER (OUTPATIENT)
Age: 66
End: 2021-10-23

## 2021-11-13 DIAGNOSIS — E78.2 MIXED HYPERLIPIDEMIA: ICD-10-CM

## 2021-11-15 RX ORDER — ROSUVASTATIN CALCIUM 5 MG/1
TABLET, COATED ORAL
Qty: 90 TABLET | Refills: 0 | OUTPATIENT
Start: 2021-11-15

## 2021-11-22 ENCOUNTER — MYC MEDICAL ADVICE (OUTPATIENT)
Dept: FAMILY MEDICINE | Facility: CLINIC | Age: 66
End: 2021-11-22
Payer: MEDICARE

## 2021-11-22 DIAGNOSIS — E78.2 MIXED HYPERLIPIDEMIA: ICD-10-CM

## 2021-11-23 ENCOUNTER — TELEPHONE (OUTPATIENT)
Dept: FAMILY MEDICINE | Facility: CLINIC | Age: 66
End: 2021-11-23
Payer: MEDICARE

## 2021-11-23 RX ORDER — ROSUVASTATIN CALCIUM 5 MG/1
5 TABLET, COATED ORAL DAILY
Qty: 90 TABLET | Refills: 0 | Status: SHIPPED | OUTPATIENT
Start: 2021-11-23 | End: 2022-05-25

## 2021-11-23 NOTE — TELEPHONE ENCOUNTER
"rosuvastatin (CRESTOR) 5 MG tablet    7/20/2020  [facility]     Coleen Smith MD PhD    Family Practice        \"Return in about 6 months (around 1/20/2021) for Physical Exam.\"    Nv: none        "

## 2021-11-23 NOTE — TELEPHONE ENCOUNTER
Med filled in previous encounter. Sent to pharm,      [facility]    Phone Message    May a detailed message be left on voicemail: yes     Reason for Call: Medication Refill Request    Has the patient contacted the pharmacy for the refill? Yes   Name of medication being requested: rosuvastatin (CRESTOR) 5 MG tablet   Provider who prescribed the medication: Coleen Smith MD PhD  Pharmacy: [facility] [address] [place] [address] - [address]  Date medication is needed: asap     Patient calling and requesting meds to be filled. Patient is confused why they are not filled since she was told in MyChart that the script was sent. Patient has been out of meds for 5 days.    Please call back the patient once Rx has been filled.    Action Taken: Message routed to:  [facility]    Travel Screening: Not Applicable

## 2021-11-24 RX ORDER — ROSUVASTATIN CALCIUM 5 MG/1
5 TABLET, COATED ORAL DAILY
Qty: 90 TABLET | Refills: 1 | Status: SHIPPED | OUTPATIENT
Start: 2021-11-24 | End: 2022-09-02

## 2022-01-21 ENCOUNTER — MYC MEDICAL ADVICE (OUTPATIENT)
Dept: FAMILY MEDICINE | Facility: CLINIC | Age: 67
End: 2022-01-21
Payer: MEDICARE

## 2022-01-21 DIAGNOSIS — R06.02 SHORTNESS OF BREATH: Primary | ICD-10-CM

## 2022-01-24 RX ORDER — ALBUTEROL SULFATE 90 UG/1
2 AEROSOL, METERED RESPIRATORY (INHALATION) EVERY 6 HOURS PRN
Qty: 18 G | Refills: 0 | Status: SHIPPED | OUTPATIENT
Start: 2022-01-24 | End: 2023-10-12

## 2022-02-12 ENCOUNTER — HEALTH MAINTENANCE LETTER (OUTPATIENT)
Age: 67
End: 2022-02-12

## 2022-04-20 ENCOUNTER — MYC MEDICAL ADVICE (OUTPATIENT)
Dept: FAMILY MEDICINE | Facility: CLINIC | Age: 67
End: 2022-04-20
Payer: MEDICARE

## 2022-04-20 DIAGNOSIS — E03.9 ACQUIRED HYPOTHYROIDISM: ICD-10-CM

## 2022-04-20 DIAGNOSIS — E03.9 ACQUIRED HYPOTHYROIDISM: Primary | ICD-10-CM

## 2022-04-21 ENCOUNTER — IMMUNIZATION (OUTPATIENT)
Dept: FAMILY MEDICINE | Facility: CLINIC | Age: 67
End: 2022-04-21
Payer: MEDICARE

## 2022-04-21 ENCOUNTER — LAB (OUTPATIENT)
Dept: LAB | Facility: CLINIC | Age: 67
End: 2022-04-21

## 2022-04-21 DIAGNOSIS — Z23 HIGH PRIORITY FOR 2019-NCOV VACCINE: Primary | ICD-10-CM

## 2022-04-21 DIAGNOSIS — Z13.220 LIPID SCREENING: ICD-10-CM

## 2022-04-21 DIAGNOSIS — E03.9 ACQUIRED HYPOTHYROIDISM: ICD-10-CM

## 2022-04-21 LAB
ANION GAP SERPL CALCULATED.3IONS-SCNC: 4 MMOL/L (ref 3–14)
BUN SERPL-MCNC: 23 MG/DL (ref 7–30)
CALCIUM SERPL-MCNC: 9.8 MG/DL (ref 8.5–10.1)
CHLORIDE BLD-SCNC: 102 MMOL/L (ref 94–109)
CHOLEST SERPL-MCNC: 215 MG/DL
CO2 SERPL-SCNC: 31 MMOL/L (ref 20–32)
CREAT SERPL-MCNC: 0.93 MG/DL (ref 0.52–1.04)
FASTING STATUS PATIENT QL REPORTED: NO
GFR SERPL CREATININE-BSD FRML MDRD: 67 ML/MIN/1.73M2
GLUCOSE BLD-MCNC: 92 MG/DL (ref 70–99)
HDLC SERPL-MCNC: 76 MG/DL
LDLC SERPL CALC-MCNC: 105 MG/DL
NONHDLC SERPL-MCNC: 139 MG/DL
POTASSIUM BLD-SCNC: 4.7 MMOL/L (ref 3.4–5.3)
SODIUM SERPL-SCNC: 137 MMOL/L (ref 133–144)
TRIGL SERPL-MCNC: 171 MG/DL
TSH SERPL DL<=0.005 MIU/L-ACNC: 3.3 MU/L (ref 0.4–4)

## 2022-04-21 PROCEDURE — 0054A COVID-19,PF,PFIZER (12+ YRS): CPT

## 2022-04-21 PROCEDURE — 91305 COVID-19,PF,PFIZER (12+ YRS): CPT

## 2022-04-21 PROCEDURE — 36415 COLL VENOUS BLD VENIPUNCTURE: CPT

## 2022-04-21 PROCEDURE — 80048 BASIC METABOLIC PNL TOTAL CA: CPT

## 2022-04-21 PROCEDURE — 80061 LIPID PANEL: CPT

## 2022-04-21 PROCEDURE — 99207 PR NO CHARGE LOS: CPT

## 2022-04-21 PROCEDURE — 84443 ASSAY THYROID STIM HORMONE: CPT

## 2022-04-21 NOTE — RESULT ENCOUNTER NOTE
Please inform patient that test result was within normal parameters.   Thank you.     Juvencio Adame M.D.

## 2022-04-22 RX ORDER — LEVOTHYROXINE SODIUM 100 UG/1
TABLET ORAL
Qty: 90 TABLET | Refills: 0 | Status: SHIPPED | OUTPATIENT
Start: 2022-04-22 | End: 2022-05-25

## 2022-04-22 NOTE — TELEPHONE ENCOUNTER
EUTHYROX 100 MCG   Last Written Prescription Date:  3/29/21  Last Fill Quantity: 90,   # refills: 3  Last Office Visit : 7/20/20  Future Office visit:  NONE  RTC  6 MOS     Routing refill request to provider for review/approval because: Drug not on the refill protocol  + OVER DUE RTC

## 2022-04-25 RX ORDER — LEVOTHYROXINE SODIUM 100 UG/1
100 TABLET ORAL DAILY
Qty: 90 TABLET | Refills: 0 | Status: SHIPPED | OUTPATIENT
Start: 2022-04-25 | End: 2022-08-11

## 2022-04-27 ENCOUNTER — TELEPHONE (OUTPATIENT)
Dept: FAMILY MEDICINE | Facility: CLINIC | Age: 67
End: 2022-04-27
Payer: MEDICARE

## 2022-04-27 NOTE — TELEPHONE ENCOUNTER
[facility]    Phone Message    May a detailed message be left on voicemail: yes     Reason for Call: Other: Per call from PT requesting to schedule follow up with Dr Smith as well as Mammogram and Dexa on the same day but Dexa has . Please enter new orders for Dexa and reach out to patient.     Action Taken: Message routed to:  [facility]    Travel Screening: Not Applicable

## 2022-04-28 ENCOUNTER — MYC MEDICAL ADVICE (OUTPATIENT)
Dept: INTERNAL MEDICINE | Facility: CLINIC | Age: 67
End: 2022-04-28
Payer: MEDICARE

## 2022-04-28 NOTE — TELEPHONE ENCOUNTER
Patient called and schedule physical with Dr Smith on wed 5/25/22 with mammogram and dexa prior. Patient notify via [employer].

## 2022-05-25 ENCOUNTER — OFFICE VISIT (OUTPATIENT)
Dept: FAMILY MEDICINE | Facility: CLINIC | Age: 67
End: 2022-05-25
Payer: MEDICARE

## 2022-05-25 ENCOUNTER — MYC MEDICAL ADVICE (OUTPATIENT)
Dept: FAMILY MEDICINE | Facility: CLINIC | Age: 67
End: 2022-05-25

## 2022-05-25 ENCOUNTER — ANCILLARY PROCEDURE (OUTPATIENT)
Dept: MAMMOGRAPHY | Facility: CLINIC | Age: 67
End: 2022-05-25
Attending: FAMILY MEDICINE
Payer: MEDICARE

## 2022-05-25 ENCOUNTER — ANCILLARY PROCEDURE (OUTPATIENT)
Dept: BONE DENSITY | Facility: CLINIC | Age: 67
End: 2022-05-25
Attending: FAMILY MEDICINE
Payer: MEDICARE

## 2022-05-25 VITALS
HEIGHT: 69 IN | WEIGHT: 236.3 LBS | DIASTOLIC BLOOD PRESSURE: 83 MMHG | RESPIRATION RATE: 12 BRPM | HEART RATE: 66 BPM | TEMPERATURE: 98 F | OXYGEN SATURATION: 98 % | SYSTOLIC BLOOD PRESSURE: 136 MMHG | BODY MASS INDEX: 35 KG/M2

## 2022-05-25 DIAGNOSIS — Z00.00 MEDICARE ANNUAL WELLNESS VISIT, SUBSEQUENT: Primary | ICD-10-CM

## 2022-05-25 DIAGNOSIS — E78.5 HYPERLIPIDEMIA LDL GOAL <100: ICD-10-CM

## 2022-05-25 DIAGNOSIS — M85.89 OSTEOPENIA OF MULTIPLE SITES: ICD-10-CM

## 2022-05-25 DIAGNOSIS — E03.9 ACQUIRED HYPOTHYROIDISM: ICD-10-CM

## 2022-05-25 DIAGNOSIS — Z12.31 ENCOUNTER FOR SCREENING MAMMOGRAM FOR BREAST CANCER: ICD-10-CM

## 2022-05-25 PROCEDURE — 77067 SCR MAMMO BI INCL CAD: CPT | Mod: GC

## 2022-05-25 PROCEDURE — 77080 DXA BONE DENSITY AXIAL: CPT | Performed by: INTERNAL MEDICINE

## 2022-05-25 PROCEDURE — 99214 OFFICE O/P EST MOD 30 MIN: CPT | Performed by: FAMILY MEDICINE

## 2022-05-25 PROCEDURE — 77063 BREAST TOMOSYNTHESIS BI: CPT | Mod: GC

## 2022-05-25 ASSESSMENT — ANXIETY QUESTIONNAIRES
7. FEELING AFRAID AS IF SOMETHING AWFUL MIGHT HAPPEN: NOT AT ALL
5. BEING SO RESTLESS THAT IT IS HARD TO SIT STILL: NOT AT ALL
GAD7 TOTAL SCORE: 0
2. NOT BEING ABLE TO STOP OR CONTROL WORRYING: NOT AT ALL
GAD7 TOTAL SCORE: 0
1. FEELING NERVOUS, ANXIOUS, OR ON EDGE: NOT AT ALL
3. WORRYING TOO MUCH ABOUT DIFFERENT THINGS: NOT AT ALL
6. BECOMING EASILY ANNOYED OR IRRITABLE: NOT AT ALL

## 2022-05-25 ASSESSMENT — PATIENT HEALTH QUESTIONNAIRE - PHQ9
5. POOR APPETITE OR OVEREATING: NOT AT ALL
SUM OF ALL RESPONSES TO PHQ QUESTIONS 1-9: 0

## 2022-05-25 ASSESSMENT — PAIN SCALES - GENERAL: PAINLEVEL: NO PAIN (0)

## 2022-05-25 NOTE — PATIENT INSTRUCTIONS
FIT stool sample   Patient should take 1200mg of calcium/day in divided doses and vitamin D3 2000IU/day.  I will let you know about the DXA - we may set up a video call about it.   Check on health care directive that it is in the chart       Nutritious diet  Eat 1200 mg calcium total every day.  Many people achieve this by a diet containing calcium (milk, yogurt, cheese, and other dairy products, supplemented food) and 1 calcium pill. If you don't eat dairy, you should take a calcium supplement 2 times a day.  2000 IU of vitamin D on daily basis.    Eat a variety of fruits and vegetables and eat whole grains.  Try to choose foods with a high NuVal score, if your grocery store shows this number. High numbers, like 80 or 90, are nutritious foods, and low scores, like 10 are less nutritious foods.          Men should drink no more than 2 alcoholic beverages daily on average; women should drink no more than 1 alcoholic beverage daily on average.    Everyone should avoid all tobacco and nicotine-containing products.    Exercise: Aim for:  Moderate activity (where you can still talk while doing it, such as walking about 100 steps per minute, or 3,000 steps in 30 minutes) for 30 minutes at least 5 days a week  Or  Vigorous exercise (you are working so hard you are breathing hard and fast and your heart rate has gone up, such as playing basketball or soccer) at least 75 minutes weekly    If you have trouble doing 30 minutes of activity, all at once, try small bursts of activity. Go to www.abefitness.com for suggestions on how you can do this at home or work.     All adults should try to do muscle strengthening exercise at least 2 days a week    Safety  Always wear bike/motorcycle helmet and seatbelt in a vehicle  Make sure your home has smoke and carbon monoxide detectors.  Wear broad spectrum sunscreen of at least SPF 15 on sun-exposed skin.    Preventing disease  See your dentist at least once a year  Have your eyes  checked every 1-2 years.  Get a flu shot each year.         GFR 58 on admission, GFR 60-70 since June 2019 per chart review, likely CKD stage 2  -No signs of SABRINA, Cr 1.3 on admission, Cr 1.2 in Oct 202  -Monitor renal indices closely

## 2022-05-25 NOTE — PROGRESS NOTES
"SUBJECTIVE:   Elizabeth Treviño is a 67 year old female who presents for Preventive Visit.        Are you in the first 12 months of your Medicare Part B coverage?  No    Physical Health:    In general, how would you rate your overall physical health? good    Outside of work, how many days during the week do you exercise? 4-5 days/week very active     Outside of work, approximately how many minutes a day do you exercise?not applicable all day long    If you drink alcohol do you typically have >3 drinks per day or >7 drinks per week? No    Do you usually eat at least 4 servings of fruit and vegetables a day, include whole grains & fiber and avoid regularly eating high fat or \"junk\" foods? NO 2 servings    Do you have any problems taking medications regularly?  No    Do you have any side effects from medications? none    Needs assistance for the following daily activities: no assistance needed    Which of the following safety concerns are present in your home?  lack of grab bars in the bathroom     Hearing impairment: No    In the past 6 months, have you been bothered by leaking of urine? Urgency sometimes    Mental Health:    In general, how would you rate your overall mental or emotional health? excellent  PHQ-2 Score:  0    Do you feel safe in your environment? Yes    Have you ever done Advance Care Planning? (For example, a Health Directive, POLST, or a discussion with a medical provider or your loved ones about your wishes): Yes, patient states has an Advance Care Planning document and will bring a copy to the clinic.    Additional concerns to address?  No    Fall risk:  none   ER visits  - 1-2 times  Sprain hand, chest congestion           Cognitive Screenin) Repeat 3 items (Leader, Season, Table)    2) Clock draw: NORMAL  3) 3 item recall: Recalls 3 objects  Results: 5    Mini-CogTM Copyright S Magaly. Licensed by the author for use in [facility]; reprinted with permission (isaodra@.[facility]). All " rights reserved.      Do you have sleep apnea, excessive snoring or daytime drowsiness?: no        PROBLEMS TO ADD ON...  Menopausal She is Gr0P0   - no vaginal bleeding - no itching burning or discharge.  Last PAP 2019  Neg HPV Neg pap   Not sexually active  Mammogram today     Hypothyroid - on medication - TSH in April 2022 was normal     Osteopenia -was on alendronate in the past -  Had DXA today - Calcium: diet - cheese some milk; vitamin D 2000IU/day  Takes 1 calcium liquid filled - 600mg bid.   - no multivit  No meds now     PSVT - no symptoms in years - controlled with valsalva     Health care maintenance  shingrix - completed  Tetanus - due in 2025  Pap 2019 - aged out  Colonoscopy 2025  Due  DXA - 5/2022  Lipid  - 4/2022  LDL = 105  On crestor   BMP normal 4/2022       Reviewed and updated as needed this visit by clinical staff                    Reviewed and updated as needed this visit by Provider                   Social History     Tobacco Use     Smoking status: Never Smoker     Smokeless tobacco: Never Used   Substance Use Topics     Alcohol use: No                           Current providers sharing in care for this patient include:   Patient Care Team:  Coleen Smith MD PhD as PCP - General (Family Medicine)  Santana Manning MD as Assigned Musculoskeletal Provider  Juvencio Adame MD as Assigned PCP    The following health maintenance items are reviewed in Epic and correct as of today:  Health Maintenance   Topic Date Due     ADVANCE CARE PLANNING  Never done     COLORECTAL CANCER SCREENING  12/28/2016     MAMMO SCREENING  10/13/2018     MEDICARE ANNUAL WELLNESS VISIT  08/14/2020     PHQ-2 (once per calendar year)  01/01/2022     FALL RISK ASSESSMENT  09/14/2022     DTAP/TDAP/TD IMMUNIZATION (3 - Td or Tdap) 08/05/2025     LIPID  04/21/2027     DEXA  12/22/2032     HEPATITIS C SCREENING  Completed     INFLUENZA VACCINE  Completed     Pneumococcal Vaccine: 65+ Years  Completed  "    ZOSTER IMMUNIZATION  Completed     COVID-19 Vaccine  Completed     IPV IMMUNIZATION  Aged Out     MENINGITIS IMMUNIZATION  Aged Out     HEPATITIS B IMMUNIZATION  Aged Out             ROS:  Constitutional, HEENT, cardiovascular, pulmonary, GI, , musculoskeletal, neuro, skin, endocrine and psych systems are negative, except as otherwise noted.    OBJECTIVE:   /83   Pulse 66   Temp 98  F (36.7  C) (Oral)   Resp 12   Ht 1.753 m (5' 9\")   Wt 107.2 kg (236 lb 4.8 oz)   SpO2 98%   BMI 34.90 kg/m   Estimated body mass index is 33.97 kg/m  as calculated from the following:    Height as of 9/14/21: 1.753 m (5' 9\").    Weight as of 9/14/21: 104.3 kg (230 lb).  EXAM:   GENERAL APPEARANCE: healthy, alert and no distress  EYES: Eyes grossly normal to inspection, PERRL and conjunctivae and sclerae normal  HENT: ear canals and TM's normal, nose and mouth mask  NECK: no adenopathy, no asymmetry, masses, or scars and thyroid normal to palpation  RESP: lungs clear to auscultation - no rales, rhonchi or wheezes  BREAST: normal without masses, tenderness or nipple discharge and no palpable axillary masses or adenopathy  CV: regular rate and rhythm, normal S1 S2, no S3 or S4, no murmur, click or rub, no peripheral edema and peripheral pulses strong  ABDOMEN: soft, nontender, no hepatosplenomegaly, no masses and bowel sounds normal  MS: no musculoskeletal defects are noted and gait is age appropriate without ataxia  SKIN: no suspicious lesions or rashes  NEURO: Normal strength and tone, sensory exam grossly normal, mentation intact and speech normal  PSYCH: mentation appears normal and affect normal/bright    Diagnostic Test Results:  Labs reviewed in Epic    ASSESSMENT / PLAN:   (Z00.00) Medicare annual wellness visit, subsequent  (primary encounter diagnosis)  Comment: immunizations UTD -  - there is a + FHx of rectal cancer and she should pobably be screened at 5 yrs which is now but she wants to do FIT  - mammogram " "done today   Plan: see orders     (E03.9) Acquired hypothyroidism  Comment: on supplement  April 2022 TSH normal range   Plan: follow     (M85.89) Osteopenia of multiple sites  Comment: no meds - had DXa today  Plan:  Get back by video call     (E78.5) Hyperlipidemia LDL goal <100  Comment: on crestor - lipid done in April  LDL was 105  Plan: cont med     Patient has been advised of split billing requirements and indicates understanding: Yes    COUNSELING:  Reviewed preventive health counseling, as reflected in patient instructions       Regular exercise       Healthy diet/nutrition       Vision screening       Hearing screening       Dental care       Osteoporosis prevention/bone health       Colon cancer screening       Advanced Planning     Estimated body mass index is 33.97 kg/m  as calculated from the following:    Height as of 9/14/21: 1.753 m (5' 9\").    Weight as of 9/14/21: 104.3 kg (230 lb).    Weight management plan: did not discuss at this visit    She reports that she has never smoked. She has never used smokeless tobacco.    Appropriate preventive services were discussed with this patient, including applicable screening as appropriate for cardiovascular disease, diabetes, osteopenia/osteoporosis, and glaucoma.  As appropriate for age/gender, discussed screening for colorectal cancer, prostate cancer, breast cancer, and cervical cancer. Checklist reviewing preventive services available has been given to the patient.    Reviewed patients plan of care and provided an AVS. The Basic Care Plan (routine screening as documented in Health Maintenance) for Elizabeth meets the Care Plan requirement. This Care Plan has been established and reviewed with the Patient.    Counseling Resources:  ATP IV Guidelines  Pooled Cohorts Equation Calculator  Breast Cancer Risk Calculator  BRCA-Related Cancer Risk Assessment: FHS-7 Tool  FRAX Risk Assessment  ICSI Preventive Guidelines  Dietary Guidelines for Americans, " 2010  USDA's MyPlate  ASA Prophylaxis  Lung CA Screening    Coleen Smith MD PhD  [facility]

## 2022-06-01 ENCOUNTER — ANCILLARY PROCEDURE (OUTPATIENT)
Dept: MAMMOGRAPHY | Facility: CLINIC | Age: 67
End: 2022-06-01
Attending: FAMILY MEDICINE
Payer: MEDICARE

## 2022-06-01 DIAGNOSIS — R92.8 ABNORMAL MAMMOGRAM OF LEFT BREAST: ICD-10-CM

## 2022-06-01 PROCEDURE — 77065 DX MAMMO INCL CAD UNI: CPT | Mod: LT | Performed by: RADIOLOGY

## 2022-06-01 PROCEDURE — G0279 TOMOSYNTHESIS, MAMMO: HCPCS | Performed by: RADIOLOGY

## 2022-06-20 ENCOUNTER — MYC MEDICAL ADVICE (OUTPATIENT)
Dept: FAMILY MEDICINE | Facility: CLINIC | Age: 67
End: 2022-06-20
Payer: MEDICARE

## 2022-06-20 NOTE — LETTER
[facility]  [address] [address]  [phone]                    June 20, 2022    Elizabeth Treviño  [address]   [facility] [address]        Dear Elizabeth,    We care about your health and have reviewed your health plan and are making recommendations based on this review, to optimize your health.    You are in particular need of attention regarding:  -Colon Cancer Screening    We are recommending that you:    -schedule a COLONOSCOPY to look for colon cancer (due every 10 years or 5 years in higher risk situations.)       Colon cancer is now the second leading cause of cancer-related deaths in the [facility] for both men and women and there are over 130,000 new cases and 50,000 deaths per year from colon cancer.  Colonoscopies can prevent 90-95% of these deaths.  Problem lesions can be removed before they ever become cancer.  This test is not only looking for cancer, but also getting rid of precancerious lesions.      If you are under/uninsured, we recommend you contact the [employer] program. [employer] is a free colorectal cancer screening program that provides free colonoscopies for eligible under/uninsured [place] men and women. If you are interested in receiving a free colonoscopy, please call [employer] at [phone] (mention code [employer]) to see if you re eligible.    If you do not wish to do a colonoscopy at this time, there is another option. It is called a FIT test or Fecal Immunochemical Occult Blood Test (take home stool sample kit).  It does not replace the colonoscopy for colorectal cancer screening, but it can detect hidden bleeding in the lower colon.  It does need to be repeated every year and if a positive result is obtained, you would be referred for a colonoscopy.      If you have completed either one of these tests at another facility, please call with the details of when and where the tests were done and if they were normal or not. Or have  the records sent to our clinic so that we can best coordinate your care.    In addition, here is a list of due or overdue Health Maintenance reminders.    Health Maintenance Due   Topic Date Due     ANNUAL REVIEW OF HM ORDERS  Never done     Colorectal Cancer Screening  12/28/2016       To address the above recommendations, we encourage you to contact us at [phone], via [employer] or by contacting Central Scheduling toll free at [phone] 24 hours a day. They will assist you with finding the most convenient time and location.    Thank you for trusting [facility] and we appreciate the opportunity to serve you.  We look forward to supporting your healthcare needs in the future.    Healthy Regards,    Your [facility] Team

## 2022-06-20 NOTE — PROGRESS NOTES
Patient Quality Outreach    Patient is due for the following:   Colon Cancer Screening -  FIT    NEXT STEPS:   Schedule a office visit for colon screening    Type of outreach:    Sent letter.      Questions for provider review:    None     NORBERTO Figueroa LPN

## 2022-06-22 DIAGNOSIS — R92.8 ABNORMAL MAMMOGRAM: Primary | ICD-10-CM

## 2022-08-07 DIAGNOSIS — E03.9 ACQUIRED HYPOTHYROIDISM: ICD-10-CM

## 2022-08-11 ENCOUNTER — MYC MEDICAL ADVICE (OUTPATIENT)
Dept: FAMILY MEDICINE | Facility: CLINIC | Age: 67
End: 2022-08-11

## 2022-08-11 RX ORDER — LEVOTHYROXINE SODIUM 100 UG/1
100 TABLET ORAL DAILY
Qty: 90 TABLET | Refills: 3 | Status: SHIPPED | OUTPATIENT
Start: 2022-08-11 | End: 2023-08-01

## 2022-08-11 NOTE — TELEPHONE ENCOUNTER
Euthyrox 100 MCG Oral Tablet  Last Written Prescription Date:   4/25/2022  Last Fill Quantity: 90,   # refills: 0  Last Office Visit :  5/25/2022  Future Office visit:  None    Routing refill request to provider for review/approval because:  Does Provider want Euthyrox med?   Or Levothyroxine?  Does it matter if it is generic or brand name for Pt care and insurance purposes?  Please advise and send new orders per Providers recommendations.       Sharon Anguiano RN  Central Triage Red Flags/Med Refills

## 2022-08-30 DIAGNOSIS — E78.2 MIXED HYPERLIPIDEMIA: ICD-10-CM

## 2022-09-02 RX ORDER — ROSUVASTATIN CALCIUM 5 MG/1
5 TABLET, COATED ORAL DAILY
Qty: 90 TABLET | Refills: 2 | Status: SHIPPED | OUTPATIENT
Start: 2022-09-02 | End: 2023-09-19

## 2022-09-26 ENCOUNTER — MYC MEDICAL ADVICE (OUTPATIENT)
Dept: FAMILY MEDICINE | Facility: CLINIC | Age: 67
End: 2022-09-26

## 2022-09-27 ENCOUNTER — TELEPHONE (OUTPATIENT)
Dept: FAMILY MEDICINE | Facility: CLINIC | Age: 67
End: 2022-09-27

## 2022-09-27 NOTE — TELEPHONE ENCOUNTER
S-(situation):  Rt. Shoulder inj    B-(background): a week ago,  was in basement and tripped on something and landed in a chair. Has been babying the shoulder and now can feel something below the skin.  Bruised. Has good ROM with right shoulder.  Pain is 3-4/10 dull ache now.    A-(assessment): fall with shoulder inj    R-(recommendations): to be seen. Delmis GALLARDO RN

## 2022-09-28 ENCOUNTER — ANCILLARY PROCEDURE (OUTPATIENT)
Dept: GENERAL RADIOLOGY | Facility: CLINIC | Age: 67
End: 2022-09-28
Attending: NURSE PRACTITIONER
Payer: MEDICARE

## 2022-09-28 ENCOUNTER — OFFICE VISIT (OUTPATIENT)
Dept: FAMILY MEDICINE | Facility: CLINIC | Age: 67
End: 2022-09-28
Payer: MEDICARE

## 2022-09-28 VITALS
OXYGEN SATURATION: 97 % | HEIGHT: 69 IN | DIASTOLIC BLOOD PRESSURE: 80 MMHG | HEART RATE: 69 BPM | SYSTOLIC BLOOD PRESSURE: 130 MMHG | RESPIRATION RATE: 18 BRPM | TEMPERATURE: 97.8 F | BODY MASS INDEX: 35.99 KG/M2 | WEIGHT: 243 LBS

## 2022-09-28 DIAGNOSIS — S49.91XA SHOULDER INJURY, RIGHT, INITIAL ENCOUNTER: ICD-10-CM

## 2022-09-28 DIAGNOSIS — S49.91XA SHOULDER INJURY, RIGHT, INITIAL ENCOUNTER: Primary | ICD-10-CM

## 2022-09-28 DIAGNOSIS — S46.209A INJURY OF TENDON OF BICEPS: ICD-10-CM

## 2022-09-28 PROCEDURE — 73030 X-RAY EXAM OF SHOULDER: CPT | Mod: TC | Performed by: RADIOLOGY

## 2022-09-28 PROCEDURE — 99214 OFFICE O/P EST MOD 30 MIN: CPT | Performed by: NURSE PRACTITIONER

## 2022-09-28 RX ORDER — NAPROXEN 500 MG/1
500 TABLET ORAL 2 TIMES DAILY WITH MEALS
Qty: 30 TABLET | Refills: 0 | Status: SHIPPED | OUTPATIENT
Start: 2022-09-28 | End: 2023-10-12

## 2022-09-28 ASSESSMENT — PAIN SCALES - GENERAL: PAINLEVEL: MODERATE PAIN (4)

## 2022-09-28 NOTE — PROGRESS NOTES
Assessment & Plan     Shoulder injury, right, initial encounter  Xray completed, no acute fracture noted pending radiology read. Patient prescribed naproxen as needed for continued pain associated with injury.   - naproxen (NAPROSYN) 500 MG tablet; Take 1 tablet (500 mg) by mouth 2 times daily (with meals)    Injury of tendon of biceps  Concern for bicep tendon rupture, patient has persistent and continued significant tenderness noted at proximal and distal insertion sites of bicep tendon. Spoke with [facility] radiology concerning this, and based on physical exam findings MRI of elbow and shoulder was reccommended. Both were ordered. Naproxen prescribed for continued pain as needed. Patient was instructed that after MRIs completed and results are back to provider the patient would be notified with results and next steps. Patient verbalizes understanding.   - MR Shoulder Right w/o Contrast; Future  - MR Elbow Right w/o Contrast; Future  - naproxen (NAPROSYN) 500 MG tablet; Take 1 tablet (500 mg) by mouth 2 times daily (with meals)  [id number]}     Patient Instructions   Schedule MRI at [phone]  I will contact you with results and next steps.  Naproxen with food twice daily for the next 3-5 days.      Return in about 1 week (around 10/5/2022) for worsening or continued symptoms.      Hiro Johnson is a 67 year old, presenting for the following health issues:  Shoulder Injury (right)      History of Present Illness       Reason for visit:  Shoulder injury  Symptom onset:  3-7 days ago  Symptoms include:  Ache  Symptom intensity:  Moderate  Symptom progression:  Staying the same  Had these symptoms before:  No  What makes it better:  Advil    She eats 2-3 servings of fruits and vegetables daily.She consumes 0 sweetened beverage(s) daily.She exercises with enough effort to increase her heart rate 20 to 29 minutes per day.  She exercises with enough effort to increase her heart rate 4 days per week.   She is taking  "medications regularly.       Does also have bruising and pain on her upper right arm as well with a bump on her right bicep.   HPI reviewed above. Additional information, patient reports tripping and falling forward hitting right inner bicep on the back of chair and was able to brace herself with her right hand on the chair and subsequently did not fall or have any other associated injuries. Patient reports that bruising and swelling developed in the medial bicep right after injury. Patient is right hand dominant and was noting \"soreness\"  and \"aching\" from shoulder down to hand. Patient has been unable to  grand kids and use her arm/hand when driving due to this pain. Patient reports she has been icing, using heat, taking ibuprofen, and tylenol which have been helping with the pain.     Review of Systems   Constitutional, HEENT, cardiovascular, pulmonary, gi and gu systems are negative, except as otherwise noted.      Objective    /80 (BP Location: Left arm, Patient Position: Sitting, Cuff Size: Adult Large)   Pulse 69   Temp 97.8  F (36.6  C) (Tympanic)   Resp 18   Ht 1.753 m (5' 9\")   Wt 110.2 kg (243 lb)   SpO2 97%   BMI 35.88 kg/m    Body mass index is 35.88 kg/m .  Physical Exam  Constitutional:       Appearance: Normal appearance.   Cardiovascular:      Rate and Rhythm: Normal rate and regular rhythm.      Pulses: Normal pulses.      Heart sounds: Normal heart sounds.   Pulmonary:      Effort: Pulmonary effort is normal.      Breath sounds: Normal breath sounds.   Musculoskeletal:      Right shoulder: Tenderness present.      Right upper arm: Swelling and deformity present.      Right elbow: Swelling and deformity present.      Comments: Bruising and deformity noted in right medial bicep. Proximal and distal insertion bicep tendon pain present. Positive bicep tendon hook test.  Positive Yergason's test. Normal ROM noted, normal CWMS noted, normal hand grasp strength noted in right, " symmetrical and equal to left hand grasp.   Skin:     General: Skin is warm and dry.   Neurological:      General: No focal deficit present.      Mental Status: She is alert and oriented to person, place, and time.   Psychiatric:         Mood and Affect: Mood normal.         Behavior: Behavior normal.          Bridget Markham FNP-S  [facility]

## 2022-09-28 NOTE — PATIENT INSTRUCTIONS
Schedule MRI at [phone]  I will contact you with results and next steps.  Naproxen with food twice daily for the next 3-5 days.

## 2022-10-01 ENCOUNTER — HOSPITAL ENCOUNTER (OUTPATIENT)
Dept: MRI IMAGING | Facility: CLINIC | Age: 67
Discharge: HOME | End: 2022-10-01
Attending: NURSE PRACTITIONER
Payer: MEDICARE

## 2022-10-01 DIAGNOSIS — S46.209A INJURY OF TENDON OF BICEPS: ICD-10-CM

## 2022-10-01 PROCEDURE — G1010 CDSM STANSON: HCPCS

## 2022-10-03 ENCOUNTER — OFFICE VISIT (OUTPATIENT)
Dept: ORTHOPEDICS | Facility: CLINIC | Age: 67
End: 2022-10-03
Payer: MEDICARE

## 2022-10-03 VITALS
SYSTOLIC BLOOD PRESSURE: 122 MMHG | HEIGHT: 69 IN | BODY MASS INDEX: 35.99 KG/M2 | DIASTOLIC BLOOD PRESSURE: 84 MMHG | WEIGHT: 243 LBS

## 2022-10-03 DIAGNOSIS — G56.21 CUBITAL TUNNEL SYNDROME ON RIGHT: ICD-10-CM

## 2022-10-03 DIAGNOSIS — S46.011A TRAUMATIC TEAR OF RIGHT ROTATOR CUFF, UNSPECIFIED TEAR EXTENT, INITIAL ENCOUNTER: Primary | ICD-10-CM

## 2022-10-03 DIAGNOSIS — M75.121 COMPLETE TEAR OF RIGHT ROTATOR CUFF, UNSPECIFIED WHETHER TRAUMATIC: Primary | ICD-10-CM

## 2022-10-03 DIAGNOSIS — S46.011A TRAUMATIC TEAR OF RIGHT ROTATOR CUFF, UNSPECIFIED TEAR EXTENT, INITIAL ENCOUNTER: ICD-10-CM

## 2022-10-03 PROCEDURE — 99214 OFFICE O/P EST MOD 30 MIN: CPT | Performed by: FAMILY MEDICINE

## 2022-10-03 NOTE — PATIENT INSTRUCTIONS
# Right Shoulder Injury: Patient noting pain following an acute injury after a fall approximately a week and half ago. She does have pain over the anterior lateral portion of her shoulder with symptoms overall improving. There is tenderness to palpation over the insertion over the supraspinatus rotator cuff tendon. No weakness on rotator cuff testing. The elbow exam is negative except for positive cubital tunnel testing. Reviewed right shoulder and elbow MRIs showing rotator cuff tear the supraspinatus tendon. Given her strength and range of motion is intact will have her treat conservatively with home exercises and follow-up if not improving. Can consider steroid injection if pain is persisting more than a month out or sooner if worsening.  Image Findings: reviewed right shoulder x-ray, MRI of the right elbow and shoulder  Treatment: Activities as tolerated, home exercises given today  Medications/Injections: Limited tylenol/ibuprofen for pain for 1-2 weeks, defer steroid injection for now  Follow-up: In one month if symptoms do not improve, sooner if worsening  Can consider subacromial steroid injection to help with rotator cuff pain    Please call [phone]   Ask for my team if you have any questions or concerns    If you have not yet received the influenza vaccine but would like to get one, please call  [phone] or you can schedule via [email or web address]    It was great seeing you again today!    Santana Manning MD, CAM

## 2022-10-03 NOTE — PROGRESS NOTES
ASSESSMENT & PLAN    Elizabeth was seen today for pain and pain.    Diagnoses and all orders for this visit:    Complete tear of right rotator cuff, unspecified whether traumatic    Traumatic tear of right rotator cuff, unspecified tear extent, initial encounter  -     Orthopedic  Referral      This issue is acute and Improving.    # Right Shoulder Injury: Patient noting pain following an acute injury after a fall approximately a week and half ago. She does have pain over the anterior lateral portion of her shoulder with symptoms overall improving. There is tenderness to palpation over the insertion over the supraspinatus rotator cuff tendon. No weakness on rotator cuff testing. The elbow exam is negative except for positive cubital tunnel testing. Reviewed right shoulder and elbow MRIs showing rotator cuff tear the supraspinatus tendon. Given her strength and range of motion is intact will have her treat conservatively with home exercises and follow-up if not improving. Can consider steroid injection if pain is persisting more than a month out or sooner if worsening.  Image Findings: reviewed right shoulder x-ray, MRI of the right elbow and shoulder  Treatment: Activities as tolerated, home exercises given today  Medications/Injections: Limited tylenol/ibuprofen for pain for 1-2 weeks, defer steroid injection for now  Follow-up: In one month if symptoms do not improve, sooner if worsening  Can consider subacromial steroid injection to help with rotator cuff pain       Santana Manning MD  [facility] [place]    -----  Chief Complaint   Patient presents with     Right Shoulder - Pain     Right Elbow - Pain       SUBJECTIVE  Elizabeth Treviño is a/an 67 year old female who is seen as a self referral for evaluation of right shoulder and elbow pain.     The patient is seen with their son.  The patient is Right handed    Onset: 1.5 week(s) ago. Patient describes injury as fall, landing on  "right shoulder  Location of Pain: right diffuse  Worsened by: any movement, constant aching   Better with: \"while working\"  Treatments tried: rest/activity avoidance, Ibuprofen, Ice   Associated symptoms: numbness and tingling    Orthopedic/Surgical history: NO  Social History/Occupation: Retired     No family history pertinent to patient's problem today.      REVIEW OF SYSTEMS:  Review of Systems  Constitutional, HEENT, cardiovascular, pulmonary, gi and gu systems are negative, except as otherwise noted.    OBJECTIVE:  /84   Ht 1.753 m (5' 9\")   Wt 110.2 kg (243 lb)   BMI 35.88 kg/m     General: healthy, alert and in no distress  HEENT: no scleral icterus or conjunctival erythema  Skin: no suspicious lesions or rash. No jaundice.  CV: distal perfusion intact    Resp: normal respiratory effort without conversational dyspnea   Psych: normal mood and affect  Gait: normal steady gait with appropriate coordination and balance    Neuro: Normal light sensory exam of right upper extremity     RIGHT SHOULDER  Inspection:    no swelling, bruising, discoloration, or obvious deformity or asymmetry  Palpation:    Tender about the supraspinatus insertion. Remainder of bony and tendinous landmarks are nontender.  Active Range of Motion:     Abduction 1800, FF 1800, , IR L1.    Strength:    Scapular plane abduction 5/5,  ER 5/5, IR 5/5, biceps 5/5, triceps 5/5  Special Tests:    Positive: Neer's and Pat'    Negative: supraspinatus (empty can), [place]'s, Speed's and Yergason's    CERVICAL SPINE  Inspection:    normal cervical lordosis present, rounded shoulders, forward head posture  Palpation:    Nontender.  Range of Motion:     Flexion full    Extension full    Right side bend full    Left side bend full    Right rotation full    Left rotation full  Strength:    Full strength throughout all neck muscles  Special Tests:    Positive: None    Negative: Spurling's (bilateral)    RIGHT ELBOW  Inspection:    No " swelling, bruising, discoloration, or obvious deformity or asymmetry  Palpation:    Tender about the medial epicondyle. Remainder of bony, ligamentous and tendinous landmarks are nontender.    Crepitus is Absent  Range of Motion:     Extension full / flexion full / pronation full / supination full  Strength:    No deficits in flexion, extension, pronation, or supination.  Special Tests:    Positive: cubital tunnel (ulnar Tinel's)    Negative: Pain with resisted wrist extension, pain with resisted wrist flexion, pain with resisted supination, pain with resisted pronation        RADIOLOGY:  I independently ordered, visualized and reviewed these images with the patient    EXAM: MR ELBOW RIGHT W/O CONTRAST  LOCATION: [facility]  DATE/TIME: 10/1/2022 8:56 AM     INDICATION: Pain, concern for distal biceps tendon tear.  COMPARISON: None.  TECHNIQUE: Unenhanced.     FINDINGS: Image quality is mildly degraded by motion.     TENDONS:   -Distal biceps: Mild distal quadriceps tendinopathy, with minimal adjacent edema. No tendon tear.  -Common extensor origin: No tendinopathy or tear.   -Common flexor tendon origin: Mild edema-like signal along the common flexor tendon origin suggesting medial epicondylitis. No tendon tear.  -Distal triceps: No tendon tear. Minimal tendinopathy.      LIGAMENTS:   -Ulnar collateral ligament: Mild edema adjacent to the ulnar collateral ligament. No gianfranco tear.   -Lateral collateral ligament complex: Lateral ulnar collateral ligament and radial collateral ligament are intact.      ELBOW JOINT:  -Ulnotrochlear articulation: Normal.   -Radiocapitellar articulation: Normal.   -Joint space: No effusion or loose bodies.     BONES:   -No fracture or bone contusion.     SOFT TISSUES:   -No muscle atrophy or edema. Cubital tunnel is normal and visualized ulnar nerve is intact. Subcutaneous edema and/or fibrosis posteriorly overlying the olecranon. No significant bursal  fluid.                                                                      IMPRESSION:  1.  Examination is compromised by motion.  2.  Mild distal biceps tendinopathy. No focal tendon tear.  3.  Mild medial epicondylitis.  4.  Low-grade sprain or inflammation of the adjacent medial ulnar collateral ligament.  5.  Mild inflammation or fibrosis posteriorly over the olecranon.        EXAM: MR SHOULDER RIGHT WITHOUT CONTRAST  LOCATION: [facility]  DATE/TIME: 10/01/2022, 8:20 AM     INDICATION: Concern for distal biceps tendon tear, however, tender to proximal insertion site with shoulder pain as well.  COMPARISON: None.  TECHNIQUE: Unenhanced.     FINDINGS:     ROTATOR CUFF:  -Supraspinatus: There is a moderate-sized full-thickness tear of the anterior portion of the distal supraspinatus tendon with moderate proximal tendon retraction but no associated supraspinatus muscle atrophy. This tear measures approximately 1.5 cm   transverse by 1.3 cm AP.  -Infraspinatus: Mild infraspinatus tendinopathy without tearing. No infraspinatus muscle atrophy.  -Subscapularis: Moderate subscapularis tendinopathy without tearing. No subscapularis muscle atrophy.  -Teres minor: No tendon tear, tendinopathy or fatty atrophy.     CORACOACROMIAL ARCH:  -Morphology: Type II acromion. No subacromial spur. Subacromial and subcoracoid space are normal.   -Bursa: Moderate subacromial-subdeltoid bursitis.     ACROMIOCLAVICULAR JOINT:   -Mild AC joint arthrosis.      LONG HEAD OF BICEPS TENDON:   -No tendinopathy or tear. No tenosynovitis or subluxation.     GLENOHUMERAL JOINT:   -Labrum: No labral tear. No paralabral cyst.   -Cartilage: Normal.   -Joint space: Small effusion. No synovitis.  -Glenohumeral ligaments and capsule: No pericapsular inflammation.     BONES:   -No fracture or concerning marrow replacing lesion.     SOFT TISSUES:   -Normal deltoid muscle bulk. Normal visualized chest wall and axilla.                                                                       IMPRESSION:  1.  Moderate-sized full-thickness tear of the anterior portion of the distal supraspinatus tendon with moderate proximal tendon retraction but no associated muscle atrophy.     2.  Mild infraspinatus and moderate subscapularis tendinopathy without tearing.     3.  Moderate subacromial-subdeltoid bursitis.       Review of external notes as documented elsewhere in note  Review of the result(s) of each unique test - right shoulder x-ray, MRI of right shoulder and elbow        Disclaimer: This note consists of symbols derived from keyboarding, dictation and/or voice recognition software. As a result, there may be errors in the script that have gone undetected. Please consider this when interpreting information found in this chart.    Patient's conditions were thoroughly discussed during today's visit with greater than 50% of the visit spent counseling the patient with total time spent face-to-face with the patient being 30 minutes.

## 2022-10-03 NOTE — LETTER
10/3/2022         RE: Elizabeth Treviño  [address]  [facility] [address]        Dear Colleague,    Thank you for referring your patient, Elizabeth Treviño, to the [facility]. Please see a copy of my visit note below.    ASSESSMENT & PLAN    Elizabeth was seen today for pain and pain.    Diagnoses and all orders for this visit:    Complete tear of right rotator cuff, unspecified whether traumatic    Traumatic tear of right rotator cuff, unspecified tear extent, initial encounter  -     Orthopedic  Referral      This issue is acute and Improving.    # Right Shoulder Injury: Patient noting pain following an acute injury after a fall approximately a week and half ago. She does have pain over the anterior lateral portion of her shoulder with symptoms overall improving. There is tenderness to palpation over the insertion over the supraspinatus rotator cuff tendon. No weakness on rotator cuff testing. The elbow exam is negative except for positive cubital tunnel testing. Reviewed right shoulder and elbow MRIs showing rotator cuff tear the supraspinatus tendon. Given her strength and range of motion is intact will have her treat conservatively with home exercises and follow-up if not improving. Can consider steroid injection if pain is persisting more than a month out or sooner if worsening.  Image Findings: reviewed right shoulder x-ray, MRI of the right elbow and shoulder  Treatment: Activities as tolerated, home exercises given today  Medications/Injections: Limited tylenol/ibuprofen for pain for 1-2 weeks, defer steroid injection for now  Follow-up: In one month if symptoms do not improve, sooner if worsening  Can consider subacromial steroid injection to help with rotator cuff pain       Santana Manning MD  [facility]    -----  Chief Complaint   Patient presents with     Right Shoulder - Pain     Right Elbow - Pain       SUBJECTIVE  Elizabeth  "SEAN Treviño is a/an 67 year old female who is seen as a self referral for evaluation of right shoulder and elbow pain.     The patient is seen with their son.  The patient is Right handed    Onset: 1.5 week(s) ago. Patient describes injury as fall, landing on right shoulder  Location of Pain: right diffuse  Worsened by: any movement, constant aching   Better with: \"while working\"  Treatments tried: rest/activity avoidance, Ibuprofen, Ice   Associated symptoms: numbness and tingling    Orthopedic/Surgical history: NO  Social History/Occupation: Retired     No family history pertinent to patient's problem today.      REVIEW OF SYSTEMS:  Review of Systems  Constitutional, HEENT, cardiovascular, pulmonary, gi and gu systems are negative, except as otherwise noted.    OBJECTIVE:  /84   Ht 1.753 m (5' 9\")   Wt 110.2 kg (243 lb)   BMI 35.88 kg/m     General: healthy, alert and in no distress  HEENT: no scleral icterus or conjunctival erythema  Skin: no suspicious lesions or rash. No jaundice.  CV: distal perfusion intact    Resp: normal respiratory effort without conversational dyspnea   Psych: normal mood and affect  Gait: normal steady gait with appropriate coordination and balance    Neuro: Normal light sensory exam of right upper extremity     RIGHT SHOULDER  Inspection:    no swelling, bruising, discoloration, or obvious deformity or asymmetry  Palpation:    Tender about the supraspinatus insertion. Remainder of bony and tendinous landmarks are nontender.  Active Range of Motion:     Abduction 1800, FF 1800, , IR L1.    Strength:    Scapular plane abduction 5/5,  ER 5/5, IR 5/5, biceps 5/5, triceps 5/5  Special Tests:    Positive: Neer's and Pat'    Negative: supraspinatus (empty can), Ferguson's, Speed's and Yergason's    CERVICAL SPINE  Inspection:    normal cervical lordosis present, rounded shoulders, forward head posture  Palpation:    Nontender.  Range of Motion:     Flexion full    Extension full   "  Right side bend full    Left side bend full    Right rotation full    Left rotation full  Strength:    Full strength throughout all neck muscles  Special Tests:    Positive: None    Negative: Spurling's (bilateral)    RIGHT ELBOW  Inspection:    No swelling, bruising, discoloration, or obvious deformity or asymmetry  Palpation:    Tender about the medial epicondyle. Remainder of bony, ligamentous and tendinous landmarks are nontender.    Crepitus is Absent  Range of Motion:     Extension full / flexion full / pronation full / supination full  Strength:    No deficits in flexion, extension, pronation, or supination.  Special Tests:    Positive: cubital tunnel (ulnar Tinel's)    Negative: Pain with resisted wrist extension, pain with resisted wrist flexion, pain with resisted supination, pain with resisted pronation        RADIOLOGY:  I independently ordered, visualized and reviewed these images with the patient    EXAM: MR ELBOW RIGHT W/O CONTRAST  LOCATION: [facility]  DATE/TIME: 10/1/2022 8:56 AM     INDICATION: Pain, concern for distal biceps tendon tear.  COMPARISON: None.  TECHNIQUE: Unenhanced.     FINDINGS: Image quality is mildly degraded by motion.     TENDONS:   -Distal biceps: Mild distal quadriceps tendinopathy, with minimal adjacent edema. No tendon tear.  -Common extensor origin: No tendinopathy or tear.   -Common flexor tendon origin: Mild edema-like signal along the common flexor tendon origin suggesting medial epicondylitis. No tendon tear.  -Distal triceps: No tendon tear. Minimal tendinopathy.      LIGAMENTS:   -Ulnar collateral ligament: Mild edema adjacent to the ulnar collateral ligament. No gianfranco tear.   -Lateral collateral ligament complex: Lateral ulnar collateral ligament and radial collateral ligament are intact.      ELBOW JOINT:  -Ulnotrochlear articulation: Normal.   -Radiocapitellar articulation: Normal.   -Joint space: No effusion or loose bodies.      BONES:   -No fracture or bone contusion.     SOFT TISSUES:   -No muscle atrophy or edema. Cubital tunnel is normal and visualized ulnar nerve is intact. Subcutaneous edema and/or fibrosis posteriorly overlying the olecranon. No significant bursal fluid.                                                                      IMPRESSION:  1.  Examination is compromised by motion.  2.  Mild distal biceps tendinopathy. No focal tendon tear.  3.  Mild medial epicondylitis.  4.  Low-grade sprain or inflammation of the adjacent medial ulnar collateral ligament.  5.  Mild inflammation or fibrosis posteriorly over the olecranon.        EXAM: MR SHOULDER RIGHT WITHOUT CONTRAST  LOCATION: [facility]  DATE/TIME: 10/01/2022, 8:20 AM     INDICATION: Concern for distal biceps tendon tear, however, tender to proximal insertion site with shoulder pain as well.  COMPARISON: None.  TECHNIQUE: Unenhanced.     FINDINGS:     ROTATOR CUFF:  -Supraspinatus: There is a moderate-sized full-thickness tear of the anterior portion of the distal supraspinatus tendon with moderate proximal tendon retraction but no associated supraspinatus muscle atrophy. This tear measures approximately 1.5 cm   transverse by 1.3 cm AP.  -Infraspinatus: Mild infraspinatus tendinopathy without tearing. No infraspinatus muscle atrophy.  -Subscapularis: Moderate subscapularis tendinopathy without tearing. No subscapularis muscle atrophy.  -Teres minor: No tendon tear, tendinopathy or fatty atrophy.     CORACOACROMIAL ARCH:  -Morphology: Type II acromion. No subacromial spur. Subacromial and subcoracoid space are normal.   -Bursa: Moderate subacromial-subdeltoid bursitis.     ACROMIOCLAVICULAR JOINT:   -Mild AC joint arthrosis.      LONG HEAD OF BICEPS TENDON:   -No tendinopathy or tear. No tenosynovitis or subluxation.     GLENOHUMERAL JOINT:   -Labrum: No labral tear. No paralabral cyst.   -Cartilage: Normal.   -Joint space: Small  effusion. No synovitis.  -Glenohumeral ligaments and capsule: No pericapsular inflammation.     BONES:   -No fracture or concerning marrow replacing lesion.     SOFT TISSUES:   -Normal deltoid muscle bulk. Normal visualized chest wall and axilla.                                                                      IMPRESSION:  1.  Moderate-sized full-thickness tear of the anterior portion of the distal supraspinatus tendon with moderate proximal tendon retraction but no associated muscle atrophy.     2.  Mild infraspinatus and moderate subscapularis tendinopathy without tearing.     3.  Moderate subacromial-subdeltoid bursitis.       Review of external notes as documented elsewhere in note  Review of the result(s) of each unique test - right shoulder x-ray, MRI of right shoulder and elbow        Disclaimer: This note consists of symbols derived from keyboarding, dictation and/or voice recognition software. As a result, there may be errors in the script that have gone undetected. Please consider this when interpreting information found in this chart.    Patient's conditions were thoroughly discussed during today's visit with greater than 50% of the visit spent counseling the patient with total time spent face-to-face with the patient being 30 minutes.        Again, thank you for allowing me to participate in the care of your patient.        Sincerely,        Santana Maninng MD

## 2022-10-10 ENCOUNTER — HEALTH MAINTENANCE LETTER (OUTPATIENT)
Age: 67
End: 2022-10-10

## 2023-08-20 ENCOUNTER — HEALTH MAINTENANCE LETTER (OUTPATIENT)
Age: 68
End: 2023-08-20

## 2023-09-19 ENCOUNTER — MYC MEDICAL ADVICE (OUTPATIENT)
Dept: FAMILY MEDICINE | Facility: CLINIC | Age: 68
End: 2023-09-19
Payer: MEDICARE

## 2023-09-19 DIAGNOSIS — E78.2 MIXED HYPERLIPIDEMIA: ICD-10-CM

## 2023-09-19 RX ORDER — ROSUVASTATIN CALCIUM 5 MG/1
5 TABLET, COATED ORAL DAILY
Qty: 90 TABLET | Refills: 0 | Status: SHIPPED | OUTPATIENT
Start: 2023-09-19 | End: 2023-10-12

## 2023-09-28 DIAGNOSIS — E03.9 ACQUIRED HYPOTHYROIDISM: Primary | ICD-10-CM

## 2023-10-06 ENCOUNTER — LAB (OUTPATIENT)
Dept: LAB | Facility: CLINIC | Age: 68
End: 2023-10-06
Payer: MEDICARE

## 2023-10-06 DIAGNOSIS — E78.5 HYPERLIPIDEMIA LDL GOAL <100: ICD-10-CM

## 2023-10-06 DIAGNOSIS — E03.9 ACQUIRED HYPOTHYROIDISM: ICD-10-CM

## 2023-10-06 LAB
T4 FREE SERPL-MCNC: 0.98 NG/DL (ref 0.9–1.7)
TSH SERPL DL<=0.005 MIU/L-ACNC: 10.93 UIU/ML (ref 0.3–4.2)

## 2023-10-06 PROCEDURE — 36415 COLL VENOUS BLD VENIPUNCTURE: CPT

## 2023-10-06 PROCEDURE — 80061 LIPID PANEL: CPT

## 2023-10-06 PROCEDURE — 84443 ASSAY THYROID STIM HORMONE: CPT

## 2023-10-06 PROCEDURE — 84439 ASSAY OF FREE THYROXINE: CPT

## 2023-10-09 DIAGNOSIS — E03.9 ACQUIRED HYPOTHYROIDISM: Primary | ICD-10-CM

## 2023-10-09 RX ORDER — LEVOTHYROXINE SODIUM 112 UG/1
112 TABLET ORAL DAILY
Qty: 60 TABLET | Refills: 0 | Status: SHIPPED | OUTPATIENT
Start: 2023-10-09 | End: 2023-11-24 | Stop reason: DRUGHIGH

## 2023-10-12 ENCOUNTER — LAB (OUTPATIENT)
Dept: FAMILY MEDICINE | Facility: CLINIC | Age: 68
End: 2023-10-12

## 2023-10-12 ENCOUNTER — OFFICE VISIT (OUTPATIENT)
Dept: FAMILY MEDICINE | Facility: CLINIC | Age: 68
End: 2023-10-12
Payer: MEDICARE

## 2023-10-12 VITALS
DIASTOLIC BLOOD PRESSURE: 68 MMHG | HEART RATE: 80 BPM | RESPIRATION RATE: 16 BRPM | TEMPERATURE: 97.3 F | WEIGHT: 237.8 LBS | BODY MASS INDEX: 36.04 KG/M2 | HEIGHT: 68 IN | OXYGEN SATURATION: 98 % | SYSTOLIC BLOOD PRESSURE: 122 MMHG

## 2023-10-12 DIAGNOSIS — E78.2 MIXED HYPERLIPIDEMIA: ICD-10-CM

## 2023-10-12 DIAGNOSIS — E66.01 CLASS 2 SEVERE OBESITY DUE TO EXCESS CALORIES WITH SERIOUS COMORBIDITY AND BODY MASS INDEX (BMI) OF 36.0 TO 36.9 IN ADULT (H): ICD-10-CM

## 2023-10-12 DIAGNOSIS — Z12.11 SCREEN FOR COLON CANCER: ICD-10-CM

## 2023-10-12 DIAGNOSIS — E66.812 CLASS 2 SEVERE OBESITY DUE TO EXCESS CALORIES WITH SERIOUS COMORBIDITY AND BODY MASS INDEX (BMI) OF 36.0 TO 36.9 IN ADULT (H): ICD-10-CM

## 2023-10-12 DIAGNOSIS — Z12.31 VISIT FOR SCREENING MAMMOGRAM: ICD-10-CM

## 2023-10-12 DIAGNOSIS — E03.9 ACQUIRED HYPOTHYROIDISM: ICD-10-CM

## 2023-10-12 DIAGNOSIS — E78.5 HYPERLIPIDEMIA LDL GOAL <100: Primary | ICD-10-CM

## 2023-10-12 DIAGNOSIS — R06.02 SHORTNESS OF BREATH: ICD-10-CM

## 2023-10-12 DIAGNOSIS — Z23 ENCOUNTER FOR IMMUNIZATION: ICD-10-CM

## 2023-10-12 DIAGNOSIS — Z00.00 ENCOUNTER FOR MEDICARE ANNUAL WELLNESS EXAM: Primary | ICD-10-CM

## 2023-10-12 LAB
CHOLEST SERPL-MCNC: 181 MG/DL
HDLC SERPL-MCNC: 60 MG/DL
LDLC SERPL CALC-MCNC: 87 MG/DL
NONHDLC SERPL-MCNC: 121 MG/DL
TRIGL SERPL-MCNC: 172 MG/DL

## 2023-10-12 PROCEDURE — G0008 ADMIN INFLUENZA VIRUS VAC: HCPCS | Performed by: NURSE PRACTITIONER

## 2023-10-12 PROCEDURE — G0438 PPPS, INITIAL VISIT: HCPCS | Performed by: NURSE PRACTITIONER

## 2023-10-12 PROCEDURE — 90480 ADMN SARSCOV2 VAC 1/ONLY CMP: CPT | Performed by: NURSE PRACTITIONER

## 2023-10-12 PROCEDURE — 90662 IIV NO PRSV INCREASED AG IM: CPT | Performed by: NURSE PRACTITIONER

## 2023-10-12 PROCEDURE — 91320 SARSCV2 VAC 30MCG TRS-SUC IM: CPT | Performed by: NURSE PRACTITIONER

## 2023-10-12 PROCEDURE — 99214 OFFICE O/P EST MOD 30 MIN: CPT | Mod: 25 | Performed by: NURSE PRACTITIONER

## 2023-10-12 RX ORDER — RESPIRATORY SYNCYTIAL VIRUS VACCINE 120MCG/0.5
0.5 KIT INTRAMUSCULAR ONCE
Qty: 1 EACH | Refills: 0 | Status: CANCELLED | OUTPATIENT
Start: 2023-10-12 | End: 2023-10-12

## 2023-10-12 RX ORDER — ALBUTEROL SULFATE 90 UG/1
2 AEROSOL, METERED RESPIRATORY (INHALATION) EVERY 6 HOURS PRN
Qty: 18 G | Refills: 0 | Status: SHIPPED | OUTPATIENT
Start: 2023-10-12

## 2023-10-12 RX ORDER — ROSUVASTATIN CALCIUM 5 MG/1
5 TABLET, COATED ORAL DAILY
Qty: 90 TABLET | Refills: 3 | Status: SHIPPED | OUTPATIENT
Start: 2023-10-12

## 2023-10-12 ASSESSMENT — ENCOUNTER SYMPTOMS
NERVOUS/ANXIOUS: 0
DYSURIA: 0
ABDOMINAL PAIN: 0
HEMATOCHEZIA: 0
NAUSEA: 0
PARESTHESIAS: 0
DIARRHEA: 0
JOINT SWELLING: 0
MYALGIAS: 0
EYE PAIN: 0
HEMATURIA: 0
SHORTNESS OF BREATH: 0
PALPITATIONS: 0
WEAKNESS: 0
BREAST MASS: 0
DIZZINESS: 0
CONSTIPATION: 0
FEVER: 0
COUGH: 0
HEADACHES: 0
ARTHRALGIAS: 0
CHILLS: 0
HEARTBURN: 0
SORE THROAT: 0
FREQUENCY: 0

## 2023-10-12 ASSESSMENT — PAIN SCALES - GENERAL: PAINLEVEL: NO PAIN (0)

## 2023-10-12 ASSESSMENT — ACTIVITIES OF DAILY LIVING (ADL): CURRENT_FUNCTION: NO ASSISTANCE NEEDED

## 2023-10-12 NOTE — PROGRESS NOTES
"SUBJECTIVE:   Elizabeth is a 68 year old who presents for Preventive Visit.      10/12/2023    11:57 AM   Additional Questions   Roomed by rmb   Accompanied by self         10/12/2023    11:57 AM   Patient Reported Additional Medications   Patient reports taking the following new medications none     Are you in the first 12 months of your Medicare coverage?  No    Healthy Habits:     In general, how would you rate your overall health?  Fair    Frequency of exercise:  6-7 days/week    Duration of exercise:  15-30 minutes    Do you usually eat at least 4 servings of fruit and vegetables a day, include whole grains    & fiber and avoid regularly eating high fat or \"junk\" foods?  No    Taking medications regularly:  Yes    Medication side effects:  None    Ability to successfully perform activities of daily living:  No assistance needed    Home Safety:  No safety concerns identified    Hearing Impairment:  No hearing concerns    In the past 6 months, have you been bothered by leaking of urine? Yes    In general, how would you rate your overall mental or emotional health?  Excellent    Additional concerns today:  No    Today's PHQ-2 Score:       10/12/2023    11:53 AM   PHQ-2 ( 1999 [employer])   Q1: Little interest or pleasure in doing things 0   Q2: Feeling down, depressed or hopeless 0   PHQ-2 Score 0   Q1: Little interest or pleasure in doing things Not at all   Q2: Feeling down, depressed or hopeless Not at all   PHQ-2 Score 0       Have you ever done Advance Care Planning? (For example, a Health Directive, POLST, or a discussion with a medical provider or your loved ones about your wishes): Yes, patient states has an Advance Care Planning document and will bring a copy to the clinic.    Fall risk  Fallen 2 or more times in the past year?: No  Any fall with injury in the past year?: No    Cognitive Screening   1) Repeat 3 items (Leader, Season, Table)    2) Clock draw: NORMAL  3) 3 item recall: Recalls 3 objects  Results: 3 " items recalled: COGNITIVE IMPAIRMENT LESS LIKELY    Mini-CogTM Copyright LOIDA Mckenzie. Licensed by the author for use in [facility]; reprinted with permission (isadora@[facility]). All rights reserved.      Do you have sleep apnea, excessive snoring or daytime drowsiness? : no    Reviewed and updated as needed this visit by clinical staff   Tobacco  Allergies  Meds  Problems  Med Hx  Surg Hx  Fam Hx  Soc   Hx        Reviewed and updated as needed this visit by Provider   Tobacco  Allergies  Meds  Problems  Med Hx  Surg Hx  Fam Hx  Soc   Hx       Social History     Tobacco Use    Smoking status: Never    Smokeless tobacco: Never   Substance Use Topics    Alcohol use: No         10/12/2023    11:52 AM   Alcohol Use   Prescreen: >3 drinks/day or >7 drinks/week? Not Applicable     Do you have a current opioid prescription? No  Do you use any other controlled substances or medications that are not prescribed by a provider? None    Current providers sharing in care for this patient include:   Patient Care Team:  Coleen Smith MD PhD as PCP - General (Family Medicine)  Coleen Smith MD PhD as Assigned PCP  Santana Manning MD as Assigned Musculoskeletal Provider    The following health maintenance items are reviewed in Epic and correct as of today:  Health Maintenance   Topic Date Due    ANNUAL REVIEW OF HM ORDERS  Never done    RSV VACCINE 60+ (1 - 1-dose 60+ series) Never done    COLORECTAL CANCER SCREENING  12/28/2016    MEDICARE ANNUAL WELLNESS VISIT  05/25/2023    MAMMO SCREENING  06/01/2023    TSH W/FREE T4 REFLEX  10/06/2024    FALL RISK ASSESSMENT  10/12/2024    DTAP/TDAP/TD IMMUNIZATION (2 - Td or Tdap) 08/05/2025    LIPID  10/06/2028    ADVANCE CARE PLANNING  10/12/2028    DEXA  05/25/2037    HEPATITIS C SCREENING  Completed    PHQ-2 (once per calendar year)  Completed    INFLUENZA VACCINE  Completed    Pneumococcal Vaccine: 65+ Years  Completed    ZOSTER IMMUNIZATION  Completed     COVID-19 Vaccine  Completed    IPV IMMUNIZATION  Aged Out    HPV IMMUNIZATION  Aged Out    MENINGITIS IMMUNIZATION  Aged Out    PAP  Discontinued     Lab work is in process  Labs reviewed in EPIC  BP Readings from Last 3 Encounters:   10/12/23 122/68   10/03/22 122/84   09/28/22 130/80    Wt Readings from Last 3 Encounters:   10/12/23 107.9 kg (237 lb 12.8 oz)   10/03/22 110.2 kg (243 lb)   09/28/22 110.2 kg (243 lb)                  Patient Active Problem List   Diagnosis    Acquired hypothyroidism    Easy bruising    Osteopenia after menopause    Pigmented skin lesion    SK (seborrheic keratosis)    Seborrheic dermatitis    Hyperlipidemia    Class 2 severe obesity due to excess calories with serious comorbidity in adult (H)     Past Surgical History:   Procedure Laterality Date    BREAST SURGERY Left 1981    benign cyst     COLONOSCOPY  2015    repeat in 10 yrs     ovary removed Left 1986    partial removal of right ovary 1990    right ovary removed         Social History     Tobacco Use    Smoking status: Never    Smokeless tobacco: Never   Substance Use Topics    Alcohol use: No     Family History   Problem Relation Age of Onset    Hypertension Mother     Cerebrovascular Disease Mother     Macular Degeneration Mother     Cancer Father         Lung cancer    Respiratory Father         COPD    No Known Problems Sister     Thyroid Disease Sister     Thyroid Disease Sister     No Known Problems Sister     Cancer Brother         Lung cancer    No Known Problems Brother     No Known Problems Brother     No Known Problems Brother     Colon Cancer Brother     No Known Problems Maternal Grandmother     No Known Problems Maternal Grandfather     No Known Problems Paternal Grandmother     No Known Problems Paternal Grandfather     Amblyopia No family hx of     Retinal detachment No family hx of     Glaucoma No family hx of          Current Outpatient Medications   Medication Sig Dispense Refill    albuterol (PROAIR  HFA/PROVENTIL HFA/VENTOLIN HFA) 108 (90 Base) MCG/ACT inhaler Inhale 2 puffs into the lungs every 6 hours as needed for shortness of breath / dyspnea 18 g 0    B Complex Vitamins (B COMPLEX 1 PO) Take 1 tablet by mouth daily       Calcium Carbonate Antacid (CALCIUM CARBONATE PO)       Cholecalciferol (VITAMIN D-3 PO) Take 2,000 Int'l Units by mouth every evening      levothyroxine (SYNTHROID/LEVOTHROID) 112 MCG tablet Take 1 tablet (112 mcg) by mouth daily 60 tablet 0    rosuvastatin (CRESTOR) 5 MG tablet Take 1 tablet (5 mg) by mouth daily 90 tablet 0    VALTREX 500 MG tablet Take 1 tablet (500 mg) by mouth 2 times daily (Patient not taking: Reported on 5/25/2022) 30 tablet 3     No Known Allergies          10/12/2023    11:53 AM   Breast CA Risk Assessment (FHS-7)   Do you have a family history of breast, colon, or ovarian cancer? No / Unknown     Mammogram Screening: Recommended mammography every 1-2 years with patient discussion and risk factor consideration  Pertinent mammograms are reviewed under the imaging tab.    If mammogram needed, she wants ultrasound with mammogram due to she always has to come back to get a recheck due to dense breast tissue.  Wants fit test or cologuard instead of colonoscopy.  Leg cramps from cholesterol medications,when she does not take it her leg cramps feel better. She is taking an OTC supplement that has helped with her leg cramps.  She thinks her congestion is due to seasonal allergies, is not taking anything. No other constitutional symptoms.    Review of Systems   Constitutional:  Negative for chills and fever.   HENT:  Positive for congestion. Negative for ear pain, hearing loss and sore throat.    Eyes:  Negative for pain and visual disturbance.   Respiratory:  Negative for cough and shortness of breath.    Cardiovascular:  Negative for chest pain, palpitations and peripheral edema.   Gastrointestinal:  Negative for abdominal pain, constipation, diarrhea, heartburn,  "hematochezia and nausea.   Breasts:  Negative for tenderness, breast mass and discharge.   Genitourinary:  Negative for dysuria, frequency, genital sores, hematuria, pelvic pain, urgency, vaginal bleeding and vaginal discharge.   Musculoskeletal:  Negative for arthralgias, joint swelling and myalgias.   Skin:  Negative for rash.   Neurological:  Negative for dizziness, weakness, headaches and paresthesias.   Psychiatric/Behavioral:  Negative for mood changes. The patient is not nervous/anxious.      OBJECTIVE:   /68   Pulse 80   Temp 97.3  F (36.3  C) (Tympanic)   Resp 16   Ht 1.715 m (5' 7.5\")   Wt 107.9 kg (237 lb 12.8 oz)   SpO2 98%   BMI 36.70 kg/m   Estimated body mass index is 36.7 kg/m  as calculated from the following:    Height as of this encounter: 1.715 m (5' 7.5\").    Weight as of this encounter: 107.9 kg (237 lb 12.8 oz).  Physical Exam  GENERAL: healthy, alert and no distress  EYES: Eyes grossly normal to inspection, PERRL and conjunctivae and sclerae normal  HENT: ear canals and TM's normal, nose and mouth without ulcers or lesions  NECK: no adenopathy, no asymmetry, masses, or scars and thyroid normal to palpation  RESP: lungs clear to auscultation - no rales, rhonchi or wheezes  CV: regular rate and rhythm, normal S1 S2, no S3 or S4, no murmur, click or rub, no peripheral edema and peripheral pulses strong  ABDOMEN: soft, nontender, no hepatosplenomegaly, no masses and bowel sounds normal  MS: no gross musculoskeletal defects noted, no edema  SKIN: no suspicious lesions or rashes  NEURO: Normal strength and tone, mentation intact and speech normal  PSYCH: mentation appears normal, affect normal/bright    Diagnostic Test Results:  Labs reviewed in Epic    ASSESSMENT / PLAN:   (Z00.00) Encounter for Medicare annual wellness exam  (primary encounter diagnosis)  Comment: Reviewed health maintenance recommendations with patient, due for colonoscopy. Patient prefers Cologaurd, cologaurd " ordered. Completed vaccinations for influenza and Covid today. Will follow-up with pharmacy for RSV vaccination.  Up to date with preventative care and screenings. Reviewed preventative care recommendations.  Advised patient to follow-up in one year for routine preventative exam.  Plan: REVIEW OF HEALTH MAINTENANCE PROTOCOL ORDERS,         PRIMARY CARE FOLLOW-UP SCHEDULING    (Z23) Encounter for immunization  Plan: INFLUENZA VACCINE 65+ (FLUZONE HD), COVID-19         12+ (2023-24) (PFIZER)    (Z12.11) Screen for colon cancer  Comment: See above.  Plan: MELANIE([employer])    (Z12.31) Visit for screening mammogram  Comment: Discussed screening guidelines and recommendations based on her personal and family history. She has never had an abnormal mammogram and has no family history of breast cancer. Plan to complete mammogram every 2 years, she will have one next year.  Plan: Mammogram next year    (E66.01,  Z68.36) Class 2 severe obesity due to excess calories with serious comorbidity and body mass index (BMI) of 36.0 to 36.9 in adult (H)  Comment: Discussed healthy diet and exercise recommendations.   Plan: See patient instructions for healthy diet and exercise recommendations.     (R06.02) Shortness of breath  Comment: She reports using inhaler as needed in the winter for reactive airway. Refilled albuterol inhaler.  Plan: albuterol (PROAIR HFA/PROVENTIL HFA/VENTOLIN         HFA) 108 (90 Base) MCG/ACT inhaler    (E03.9) Acquired hypothyroidism  Comment: TSH 10.93 on 10/6/23, increased levothyroxine from 100 mcg daily to 112 mcg daily on 10/9/23. Will re-check TSH in 6 weeks following dose adjustment.  Plan: Check TSH in 6 weeks.    (E78.2) Mixed hyperlipidemia  Comment: History of hyperlipidemia, levels have improved with rosuvastatin. Taking rosuvastatin 5 mg daily. Refilled rosuvastatin.    Plan: rosuvastatin (CRESTOR) 5 MG tablet    Patient has been advised of split billing requirements and indicates  understanding: Yes      COUNSELING:  Reviewed preventive health counseling, as reflected in patient instructions       Regular exercise       Healthy diet/nutrition       Vision screening       Dental care       Immunizations  Vaccinated for: Covid-19 and Influenza           Osteoporosis prevention/bone health       Colon cancer screening    She reports that she has never smoked. She has never used smokeless tobacco.      Appropriate preventive services were discussed with this patient, including applicable screening as appropriate for fall prevention, nutrition, physical activity, Tobacco-use cessation, weight loss and cognition.  Checklist reviewing preventive services available has been given to the patient.    Reviewed patients plan of care and provided an AVS. The Basic Care Plan (routine screening as documented in Health Maintenance) for Elizabeth meets the Care Plan requirement. This Care Plan has been established and reviewed with the Patient.    Citlaly SANDERS I, Erin Worthy, was present with the NP student who participated in the service and in the documentationof the note.   I have verified the history and personally performed the physical exam and medical decision making.  I agree with the assessment and plan of care as documented in the note.     Erin Worthy NP on 10/12/2023 at 2:39 PM    [facility]

## 2023-10-12 NOTE — PATIENT INSTRUCTIONS
Patient Education   Personalized Prevention Plan  You are due for the preventive services outlined below.  Your care team is available to assist you in scheduling these services.  If you have already completed any of these items, please share that information with your care team to update in your medical record.  Health Maintenance Due   Topic Date Due     ANNUAL REVIEW OF HM ORDERS  Never done     RSV VACCINE 60+ (1 - 1-dose 60+ series) Never done     Colorectal Cancer Screening  12/28/2016     Annual Wellness Visit  05/25/2023     Mammogram  06/01/2023     Flu Vaccine (1) 09/01/2023     COVID-19 Vaccine (5 - 2023-24 season) 09/01/2023

## 2023-11-06 LAB — NONINV COLON CA DNA+OCC BLD SCRN STL QL: NEGATIVE

## 2023-11-22 ENCOUNTER — LAB (OUTPATIENT)
Dept: LAB | Facility: CLINIC | Age: 68
End: 2023-11-22
Payer: MEDICARE

## 2023-11-22 DIAGNOSIS — E03.9 ACQUIRED HYPOTHYROIDISM: ICD-10-CM

## 2023-11-22 DIAGNOSIS — R35.0 INCREASED FREQUENCY OF URINATION: ICD-10-CM

## 2023-11-22 DIAGNOSIS — Z13.1 SCREENING FOR DIABETES MELLITUS: ICD-10-CM

## 2023-11-22 LAB
HBA1C MFR BLD: 5.6 % (ref 0–5.6)
T4 FREE SERPL-MCNC: 1.37 NG/DL (ref 0.9–1.7)
TSH SERPL DL<=0.005 MIU/L-ACNC: 4.22 UIU/ML (ref 0.3–4.2)

## 2023-11-22 PROCEDURE — 36415 COLL VENOUS BLD VENIPUNCTURE: CPT

## 2023-11-22 PROCEDURE — 84439 ASSAY OF FREE THYROXINE: CPT

## 2023-11-22 PROCEDURE — 83036 HEMOGLOBIN GLYCOSYLATED A1C: CPT | Mod: GZ

## 2023-11-22 PROCEDURE — 84443 ASSAY THYROID STIM HORMONE: CPT

## 2023-11-24 DIAGNOSIS — E03.9 ACQUIRED HYPOTHYROIDISM: Primary | ICD-10-CM

## 2023-11-24 RX ORDER — LEVOTHYROXINE SODIUM 125 UG/1
125 TABLET ORAL DAILY
Qty: 60 TABLET | Refills: 0 | Status: SHIPPED | OUTPATIENT
Start: 2023-11-24 | End: 2024-01-07

## 2024-01-04 ENCOUNTER — LAB (OUTPATIENT)
Dept: LAB | Facility: CLINIC | Age: 69
End: 2024-01-04
Payer: MEDICARE

## 2024-01-04 DIAGNOSIS — E03.9 ACQUIRED HYPOTHYROIDISM: ICD-10-CM

## 2024-01-04 LAB — TSH SERPL DL<=0.005 MIU/L-ACNC: 2.59 UIU/ML (ref 0.3–4.2)

## 2024-01-04 PROCEDURE — 84443 ASSAY THYROID STIM HORMONE: CPT

## 2024-01-04 PROCEDURE — 36415 COLL VENOUS BLD VENIPUNCTURE: CPT

## 2024-01-06 ENCOUNTER — MYC MEDICAL ADVICE (OUTPATIENT)
Dept: FAMILY MEDICINE | Facility: CLINIC | Age: 69
End: 2024-01-06
Payer: MEDICARE

## 2024-01-06 DIAGNOSIS — E03.9 ACQUIRED HYPOTHYROIDISM: ICD-10-CM

## 2024-01-07 ENCOUNTER — MYC REFILL (OUTPATIENT)
Dept: FAMILY MEDICINE | Facility: CLINIC | Age: 69
End: 2024-01-07
Payer: MEDICARE

## 2024-01-07 DIAGNOSIS — E78.2 MIXED HYPERLIPIDEMIA: ICD-10-CM

## 2024-01-07 RX ORDER — LEVOTHYROXINE SODIUM 125 UG/1
125 TABLET ORAL DAILY
Qty: 90 TABLET | Refills: 3 | Status: SHIPPED | OUTPATIENT
Start: 2024-01-07

## 2024-01-08 RX ORDER — ROSUVASTATIN CALCIUM 5 MG/1
5 TABLET, COATED ORAL DAILY
Qty: 90 TABLET | Refills: 3 | OUTPATIENT
Start: 2024-01-08

## 2024-01-08 NOTE — TELEPHONE ENCOUNTER
EKG was handed to [facility]. Pharmacy requested refills that are already active on file. Refused request to pharmacy.

## 2024-01-15 ENCOUNTER — ANCILLARY PROCEDURE (OUTPATIENT)
Dept: MAMMOGRAPHY | Facility: CLINIC | Age: 69
End: 2024-01-15
Attending: FAMILY MEDICINE
Payer: MEDICARE

## 2024-01-15 DIAGNOSIS — Z12.31 VISIT FOR SCREENING MAMMOGRAM: ICD-10-CM

## 2024-01-15 PROCEDURE — 77067 SCR MAMMO BI INCL CAD: CPT | Mod: TC | Performed by: RADIOLOGY

## 2024-01-15 PROCEDURE — 77063 BREAST TOMOSYNTHESIS BI: CPT | Mod: TC | Performed by: RADIOLOGY

## 2024-03-11 ENCOUNTER — OFFICE VISIT (OUTPATIENT)
Dept: URGENT CARE | Facility: URGENT CARE | Age: 69
End: 2024-03-11
Payer: MEDICARE

## 2024-03-11 VITALS
OXYGEN SATURATION: 99 % | BODY MASS INDEX: 37.81 KG/M2 | DIASTOLIC BLOOD PRESSURE: 84 MMHG | SYSTOLIC BLOOD PRESSURE: 177 MMHG | TEMPERATURE: 99.3 F | WEIGHT: 245 LBS | RESPIRATION RATE: 16 BRPM | HEART RATE: 78 BPM

## 2024-03-11 DIAGNOSIS — J01.40 ACUTE NON-RECURRENT PANSINUSITIS: Primary | ICD-10-CM

## 2024-03-11 DIAGNOSIS — H65.92 OME (OTITIS MEDIA WITH EFFUSION), LEFT: ICD-10-CM

## 2024-03-11 PROCEDURE — 99213 OFFICE O/P EST LOW 20 MIN: CPT | Performed by: NURSE PRACTITIONER

## 2024-03-11 NOTE — PROGRESS NOTES
SUBJECTIVE:  Elizabeth Treviño is a 68 year old female who presents to the clinic today with a chief complaint of cough  for 1 week(s). And ear pain that started 1 days ago and is worsening  Her cough is described as productive green.      The patient's symptoms are moderate and worsening.    Associated symptoms include congestion, nasal congestion, and rhinorrhea.  Patient has been using albuterol, nasal decongestant, Claritin, sudafed, mucinex to improve symptoms.and     Past Medical History:   Diagnosis Date    Acquired hypothyroidism 9/26/2012     Problem list name updated by automated process. Provider to review    Early menopause     ovarian failure at age 36     Endometriosis     Osteopenia     PSVT (paroxysmal supraventricular tachycardia) 9/26/2012       Current Outpatient Medications   Medication Sig Dispense Refill    albuterol (PROAIR HFA/PROVENTIL HFA/VENTOLIN HFA) 108 (90 Base) MCG/ACT inhaler Inhale 2 puffs into the lungs every 6 hours as needed for shortness of breath 18 g 0    B Complex Vitamins (B COMPLEX 1 PO) Take 1 tablet by mouth daily       Calcium Carbonate Antacid (CALCIUM CARBONATE PO)       Cholecalciferol (VITAMIN D-3 PO) Take 2,000 Int'l Units by mouth every evening      levothyroxine (SYNTHROID/LEVOTHROID) 125 MCG tablet Take 1 tablet (125 mcg) by mouth daily 90 tablet 3    rosuvastatin (CRESTOR) 5 MG tablet Take 1 tablet (5 mg) by mouth daily 90 tablet 3    VALTREX 500 MG tablet Take 1 tablet (500 mg) by mouth 2 times daily 30 tablet 3       Social History     Tobacco Use    Smoking status: Never    Smokeless tobacco: Never   Substance Use Topics    Alcohol use: No       OBJECTIVE:  BP (!) 177/84   Pulse 78   Temp 99.3  F (37.4  C) (Tympanic)   Resp 16   Wt 111.1 kg (245 lb)   SpO2 99%   BMI 37.81 kg/m    GENERAL APPEARANCE: healthy, alert and no distress  EYES: EOMI,  PERRL, conjunctiva clear  HENT: TM erythematous left and TM congested/bulging left, right TM normal pearly  grey  NECK: supple, nontender, no lymphadenopathy  RESP: lungs clear to auscultation - no rales, rhonchi or wheezes  CV: regular rates and rhythm, normal S1 S2, no murmur noted  ABDOMEN:  soft, nontender, no HSM or masses and bowel sounds normal  NEURO: Normal strength and tone, sensory exam grossly normal,  normal speech and mentation  SKIN: no suspicious lesions or rashes    ASSESSMENT:    1. Acute non-recurrent pansinusitis      2. OME (otitis media with effusion), left    - amoxicillin-clavulanate (AUGMENTIN) 875-125 MG tablet; Take 1 tablet by mouth 2 times daily for 7 days  Dispense: 14 tablet; Refill: 0    PLAN:  1.  Take antibiotic according to bottle instructions with food to avoid stomach upset.  If you are prone to stomach upset with antibiotics, I recommend adding a probiotic to this regimen.  Culturelle is a trusted brand.  2.  I recommend using Mucinex to help thin mucus secretions.  Adding a saline nasal spray can also be helpful with clearing sinus congestion.  3.  Take Advil or Tylenol as needed for pain or fever control.  4.  Follow-up if you not having any improvement in your symptoms over the next 5 days.  _____________  You have an ear infection. We will treat this with an antibiotic. I have sent Augmentin to your pharmacy. Please take this twice daily for 7 days. Take with food to avoid stomach upset. Consider taking a probiotic while on antibiotics to put the good bacteria back in your gut. Follow up in the next 7-10 days if not improving as expected, sooner if worse.

## 2024-03-12 NOTE — PATIENT INSTRUCTIONS
1.  Take antibiotic according to bottle instructions with food to avoid stomach upset.  If you are prone to stomach upset with antibiotics, I recommend adding a probiotic to this regimen.  Culturelle is a trusted brand.  2.  I recommend using Mucinex to help thin mucus secretions.  Adding a saline nasal spray can also be helpful with clearing sinus congestion.  3.  Take Advil or Tylenol as needed for pain or fever control.  4.  Follow-up if you not having any improvement in your symptoms over the next 5 days.  _____________  You have an ear infection. We will treat this with an antibiotic. I have sent Augmentin to your pharmacy. Please take this twice daily for 7 days. Take with food to avoid stomach upset. Consider taking a probiotic while on antibiotics to put the good bacteria back in your gut. Follow up in the next 7-10 days if not improving as expected, sooner if worse.

## 2024-03-19 ENCOUNTER — OFFICE VISIT (OUTPATIENT)
Dept: URGENT CARE | Facility: URGENT CARE | Age: 69
End: 2024-03-19
Payer: MEDICARE

## 2024-03-19 VITALS
DIASTOLIC BLOOD PRESSURE: 76 MMHG | BODY MASS INDEX: 37.81 KG/M2 | HEART RATE: 63 BPM | WEIGHT: 245 LBS | OXYGEN SATURATION: 99 % | RESPIRATION RATE: 16 BRPM | SYSTOLIC BLOOD PRESSURE: 145 MMHG | TEMPERATURE: 97.8 F

## 2024-03-19 DIAGNOSIS — H65.93 MEE (MIDDLE EAR EFFUSION), BILATERAL: Primary | ICD-10-CM

## 2024-03-19 PROCEDURE — 99213 OFFICE O/P EST LOW 20 MIN: CPT

## 2024-03-19 NOTE — PROGRESS NOTES
URGENT CARE  Assessment & Plan   Assessment:   Elizabeth Treviño is a 68 year old female who's clinical presentation today is consistent with:   1. PALOMO (middle ear effusion), bilateral  Plan:  Will treat patient's middle ear effusion/ fluid collection (without acute signs of infection) today,  symptomatically and supportively. This will include: warm packs, avoidance of allergens, using antihistamines and decongestants; patient just finished a 10-day course of Augmentin for sinus infection, discussed with her that it is normal to have a muffled or full sense of hearing for about a week even after finishing the antibiotics and this is due to the bacteria killing the infection however the fluid that sits behind the middle inner ear still needs to get up reabsorbed vs her original infection was caused by a virus all along and antibiotics did not improve her symptoms because it is viral, in that case symptoms will resolve as she clears the virus  Educated patient that should  OME become persistent, it is reasonable to follow up with PCP or ENT specialist for speech and hearing evaluation to avoid any complications, additionally to rule out any eustachian tube dysfunction or need for myringotomy tubes -patient declined referral today  We also discussed if symptoms do not improve after starting today's treatment plan (or if symptoms worsen) to follow up in 10-14} days.    No alarm signs or symptoms present   Differential Diagnoses for this patient's chief complaint that I considered include:  AOM, OME, otitis externa, viral URI, other bacterial pathology, ceruminosis, eustachian tube dysfunction,     Patient is} agreeable to treatment plan and state they will follow-up if symptoms do not improve and/or if symptoms worsen   see patient's AVS 'monitor for' section for specific patient instructions given and discussed regarding what to watch for and when to follow up    SAY [facility]  [facility]      ______________________________________________________________________      Subjective     HPI: Elizabeth Treviño  is a 68 year old  female who presents today for evaluation the following concerns:   Patient  endorses bilateral ear pain today; patient states she was just recently treated for bilateral ear infection and sinus infection a week ago with a 10-day course of Augmentin.  Patient states she feels like her sinus symptoms have mildly gotten better however she still has ear pain.  She endorses her ears feel plugged and she also still has a cough.  Patient denies any new fevers, shortness of breath or difficulty breathing.    Review of Systems:  Pertinent review of systems as reflected in HPI, otherwise negative.     Objective    Physical Exam:  Vitals:    03/19/24 1218   BP: (!) 145/76   Pulse: 63   Resp: 16   Temp: 97.8  F (36.6  C)   TempSrc: Tympanic   SpO2: 99%   Weight: 111.1 kg (245 lb)      General: Alert and oriented, no acute distress, Vital signs reviewed: afebrile,  normotensive   Psy/mental status: Cooperative, nonanxious  SKIN: Intact, no rashes  EYES: EOMs intact, PERRLA bilaterally   Conjunctiva: Clear bilaterally, no injection or erythema present  EARS:   Bilateral} TMs intact, with no erythremia  Slight bulging noted bilaterally} but with translucency preserved   no signs of acute otitis media infection, consistent with middle ear fluid/congestion   Bilateral- Canals are without swelling, mild cerumen, No impaction  NOSE:  mucosa moist               No frontal or maxillary sinus tenderness present bilaterally  MOUTH/THROAT: lips, tongue, & oral mucosa appear normal upon inspection                Posterior oropharynx is erythematous but without exudate, lesions or tonsillar  Edema, no dysphonia, no unilateral tonsillar edema, no uvular deviation,   no signs of peritonsillar abscess  NECK: supple, has full range of motion with no meningeal signs              No lymphadenopathy  present  LUNG: normal work of breathing, good respiratory effort without retractions, good air  movement, non labored, inspection reveals normal chest expansion w/  inspiration            Lung sounds are clear to auscultation bilaterally,            No rales/rhonic/crackles wheezing noted           No cough noted     ______________________________________________________________________    I explained my diagnostic considerations and recommendations to the patient, who voiced understanding and agreement with the treatment plan.   All questions were answered.   We discussed potential side effects, risks and benefits of any prescribed or recommended therapies, as well as expectations for response to treatments.  Please see AVS for any patient instructions & handouts given.   Patient was advised to contact the Nurse Care Line, their Primary Care provider, Urgent Care, or the Emergency Department if there are new or worsening symptoms, or call 911 for emergencies.

## 2024-03-25 ENCOUNTER — MYC MEDICAL ADVICE (OUTPATIENT)
Dept: FAMILY MEDICINE | Facility: CLINIC | Age: 69
End: 2024-03-25
Payer: MEDICARE

## 2024-03-27 ENCOUNTER — OFFICE VISIT (OUTPATIENT)
Dept: FAMILY MEDICINE | Facility: CLINIC | Age: 69
End: 2024-03-27
Payer: MEDICARE

## 2024-03-27 VITALS
OXYGEN SATURATION: 97 % | HEIGHT: 68 IN | RESPIRATION RATE: 16 BRPM | BODY MASS INDEX: 37.78 KG/M2 | HEART RATE: 71 BPM | DIASTOLIC BLOOD PRESSURE: 70 MMHG | SYSTOLIC BLOOD PRESSURE: 126 MMHG | TEMPERATURE: 97.8 F

## 2024-03-27 DIAGNOSIS — H69.92 DYSFUNCTION OF LEFT EUSTACHIAN TUBE: ICD-10-CM

## 2024-03-27 DIAGNOSIS — H65.92 MEE (MIDDLE EAR EFFUSION), LEFT: Primary | ICD-10-CM

## 2024-03-27 PROCEDURE — 99213 OFFICE O/P EST LOW 20 MIN: CPT | Performed by: NURSE PRACTITIONER

## 2024-03-27 ASSESSMENT — PAIN SCALES - GENERAL: PAINLEVEL: NO PAIN (0)

## 2024-03-27 NOTE — PATIENT INSTRUCTIONS
PALOMO (middle ear effusion), left  Dysfunction of left eustachian tube  Patient recently treated for sinusitis, finished full course of antibiotics.  Has had continued bilateral ear plugging, popping and some mild pain in left.  Right ear has improved, left still the same.  Has trialed multiple over-the-counter antihistamines, Mucinex, decongestants without improvement.  Denies any fevers.  Effusion still noted on exam with very mild erythema noted.    Discussed with patient and recommended starting Flonase nasal spray, 2 sprays each nostril once daily.  Reviewed proper administration instructions with patient.  Also discussed transitioning to alternate antihistamine such as Allegra and taking daily  Discussed chiropractic care  If symptoms are still not improving or worsening in approximately 5 to 7 days, patient to [facility] provider and we will consider course of antibiotics for left ear.

## 2024-03-27 NOTE — PROGRESS NOTES
{PROVIDER CHARTING PREFERENCE:381916}    Subjective   Elizabeth is a 68 year old, presenting for the following health issues:  Ear Problem      3/27/2024    10:28 AM   Additional Questions   Roomed by Kathy WATKINS     {SUPERLIST (Optional):880375}    {additonal problems for provider to add (Optional):122761}    {ROS Picklists (Optional):741557}      Objective    There were no vitals taken for this visit.  There is no height or weight on file to calculate BMI.  Physical Exam   {Exam List (Optional):704326}    {Diagnostic Test Results (Optional):093989}        Signed Electronically by: Madhavi Saleh DNP  {Email feedback regarding this note to [email or web address]   :[id number]}

## 2024-03-27 NOTE — PROGRESS NOTES
"  Assessment & Plan     PALOMO (middle ear effusion), left  Dysfunction of left eustachian tube  Patient recently treated for sinusitis, finished full course of antibiotics.  Has had continued bilateral ear plugging, popping and some mild pain in left.  Right ear has improved, left still the same.  Has trialed multiple over-the-counter antihistamines, Mucinex, decongestants without improvement.  Denies any fevers.  Effusion still noted on exam with very mild erythema noted.    Discussed with patient and recommended starting Flonase nasal spray, 2 sprays each nostril once daily.  Reviewed proper administration instructions with patient.  Also discussed transitioning to alternate antihistamine such as Allegra and taking daily  Discussed chiropractic care  If symptoms are still not improving or worsening in approximately 5 to 7 days, patient to [facility] provider and we will consider course of antibiotics for left ear.          BMI  Estimated body mass index is 37.78 kg/m  as calculated from the following:    Height as of this encounter: 1.715 m (5' 7.52\").    Weight as of 3/19/24: 111.1 kg (245 lb).   Weight management plan: Patient was referred to their PCP to discuss a diet and exercise plan.      See Patient Instructions After discussion with patient, patient verbalizes and agreeable to receive AVS instructions via My Chart, not printed today     Hiro Johnson is a 68 year old, presenting for the following health issues:  Ear Problem        3/27/2024    10:28 AM   Additional Questions   Roomed by Kathy MARISCAL     History of Present Illness       Reason for visit:  Plugged ear    She eats 0-1 servings of fruits and vegetables daily.She consumes 1 sweetened beverage(s) daily.She exercises with enough effort to increase her heart rate 20 to 29 minutes per day.  She exercises with enough effort to increase her heart rate 4 days per week.   She is taking medications regularly.       Patient is here to follow up with " "her left ear infection. She has finished her antibiotics but her ear still feels plugged.    Zyrtec, Claritin, Benadryl, Sudafed, Mucinex DM  Left mainly still plugged   No dizziness  Can hear heartbeat         Review of Systems  Constitutional, HEENT, cardiovascular, pulmonary, gi and gu systems are negative, except as otherwise noted.      Objective    /70 (BP Location: Right arm, Cuff Size: Adult Large)   Pulse 71   Temp 97.8  F (36.6  C) (Tympanic)   Resp 16   Ht 1.715 m (5' 7.52\")   SpO2 97%   BMI 37.78 kg/m    Body mass index is 37.78 kg/m .  Physical Exam   GENERAL: alert and no distress  HENT: right ear: normal: no effusions, no erythema, normal landmarks, left ear: clear effusion and very mild erythema near top of TM, nose and mouth without ulcers or lesions, oropharynx clear, and oral mucous membranes moist  NECK: no adenopathy, no asymmetry, masses, or scars  PSYCH: mentation appears normal, affect normal/bright    Diagnostic Test Results:  none          Signed Electronically by: Madhavi Saleh, DNP, APRN-CNP     "

## 2024-04-01 ENCOUNTER — MYC MEDICAL ADVICE (OUTPATIENT)
Dept: FAMILY MEDICINE | Facility: CLINIC | Age: 69
End: 2024-04-01
Payer: MEDICARE

## 2024-04-01 DIAGNOSIS — H65.02 ACUTE SEROUS OTITIS MEDIA OF LEFT EAR, RECURRENCE NOT SPECIFIED: Primary | ICD-10-CM

## 2024-04-01 NOTE — TELEPHONE ENCOUNTER
Copied from OV notes on 03/27/24:   If symptoms are still not improving or worsening in approximately 5 to 7 days, patient to MyChart message provider and we will consider course of antibiotics for left ear.   Augustina SANDY RN

## 2024-04-11 ENCOUNTER — MYC MEDICAL ADVICE (OUTPATIENT)
Dept: FAMILY MEDICINE | Facility: CLINIC | Age: 69
End: 2024-04-11
Payer: MEDICARE

## 2024-04-11 DIAGNOSIS — H65.02 ACUTE SEROUS OTITIS MEDIA OF LEFT EAR, RECURRENCE NOT SPECIFIED: ICD-10-CM

## 2024-04-11 DIAGNOSIS — H65.92 MEE (MIDDLE EAR EFFUSION), LEFT: Primary | ICD-10-CM

## 2024-08-29 ENCOUNTER — OFFICE VISIT (OUTPATIENT)
Dept: FAMILY MEDICINE | Facility: CLINIC | Age: 69
End: 2024-08-29
Payer: MEDICARE

## 2024-08-29 VITALS
HEART RATE: 66 BPM | TEMPERATURE: 97.3 F | HEIGHT: 68 IN | SYSTOLIC BLOOD PRESSURE: 128 MMHG | WEIGHT: 243 LBS | RESPIRATION RATE: 16 BRPM | DIASTOLIC BLOOD PRESSURE: 62 MMHG | OXYGEN SATURATION: 98 % | BODY MASS INDEX: 36.83 KG/M2

## 2024-08-29 DIAGNOSIS — E03.9 ACQUIRED HYPOTHYROIDISM: ICD-10-CM

## 2024-08-29 DIAGNOSIS — E78.2 MIXED HYPERLIPIDEMIA: ICD-10-CM

## 2024-08-29 DIAGNOSIS — Z00.00 ENCOUNTER FOR MEDICARE ANNUAL WELLNESS EXAM: Primary | ICD-10-CM

## 2024-08-29 DIAGNOSIS — E66.812 CLASS 2 SEVERE OBESITY DUE TO EXCESS CALORIES WITH SERIOUS COMORBIDITY AND BODY MASS INDEX (BMI) OF 36.0 TO 36.9 IN ADULT (H): ICD-10-CM

## 2024-08-29 DIAGNOSIS — E66.01 CLASS 2 SEVERE OBESITY DUE TO EXCESS CALORIES WITH SERIOUS COMORBIDITY AND BODY MASS INDEX (BMI) OF 36.0 TO 36.9 IN ADULT (H): ICD-10-CM

## 2024-08-29 PROCEDURE — G0439 PPPS, SUBSEQ VISIT: HCPCS | Performed by: NURSE PRACTITIONER

## 2024-08-29 SDOH — HEALTH STABILITY: PHYSICAL HEALTH: ON AVERAGE, HOW MANY MINUTES DO YOU ENGAGE IN EXERCISE AT THIS LEVEL?: 10 MIN

## 2024-08-29 SDOH — HEALTH STABILITY: PHYSICAL HEALTH: ON AVERAGE, HOW MANY DAYS PER WEEK DO YOU ENGAGE IN MODERATE TO STRENUOUS EXERCISE (LIKE A BRISK WALK)?: 7 DAYS

## 2024-08-29 ASSESSMENT — PAIN SCALES - GENERAL: PAINLEVEL: NO PAIN (0)

## 2024-08-29 ASSESSMENT — SOCIAL DETERMINANTS OF HEALTH (SDOH): HOW OFTEN DO YOU GET TOGETHER WITH FRIENDS OR RELATIVES?: MORE THAN THREE TIMES A WEEK

## 2024-08-29 NOTE — PROGRESS NOTES
Preventive Care Visit  [facility]  Erin Worthy NP, Family Medicine  Aug 29, 2024    Assessment & Plan     Encounter for Medicare annual wellness exam  Patient is up to date on vaccinations and screenings.  Reviewed preventative health recommendations and advised follow-up in 1 year for annual medicare physical.    Class 2 severe obesity due to excess calories with serious comorbidity and body mass index (BMI) of 36.0 to 36.9 in adult (H)  Discussed diet and exercise.    Acquired hypothyroidism  Stable.  Will follow-up with lab and refill when due.    Mixed hyperlipidemia  Stable.  Will follow-up with lab and refill when due.    Counseling  Appropriate preventive services were addressed with this patient via screening, questionnaire, or discussion as appropriate for fall prevention, nutrition, physical activity, Tobacco-use cessation, social engagement, weight loss and cognition.  Checklist reviewing preventive services available has been given to the patient.  Reviewed patient's diet, addressing concerns and/or questions.   She is at risk for psychosocial distress and has been provided with information to reduce risk.   The patient was provided with written information regarding signs of hearing loss.   Information on urinary incontinence and treatment options given to patient.       See Patient Instructions    Hiro Johnson is a 69 year old, presenting for the following:  Wellness Visit and Arthritis (Has questions about osteoporosis )        8/29/2024     8:49 AM   Additional Questions   Roomed by rmb   Accompanied by self         8/29/2024     8:49 AM   Patient Reported Additional Medications   Patient reports taking the following new medications none     Health Care Directive  Patient does not have a Health Care Directive or Living Will: Discussed advance care planning with patient; information given to patient to review.    HPI        8/29/2024   General Health   How  would you rate your overall physical health? (!) FAIR   Feel stress (tense, anxious, or unable to sleep) Only a little      (!) STRESS CONCERN      8/29/2024   Nutrition   Diet: Regular (no restrictions)            8/29/2024   Exercise   Days per week of moderate/strenous exercise 7 days   Average minutes spent exercising at this level 10 min            8/29/2024   Social Factors   Frequency of gathering with friends or relatives More than three times a week   Worry food won't last until get money to buy more No   Food not last or not have enough money for food? No   Do you have housing? (Housing is defined as stable permanent housing and does not include staying ouside in a car, in a tent, in an abandoned building, in an overnight shelter, or couch-surfing.) Yes   Are you worried about losing your housing? No   Lack of transportation? No   Unable to get utilities (heat,electricity)? No            8/29/2024   Fall Risk   Fallen 2 or more times in the past year? No   Trouble with walking or balance? Yes   Gait Speed Test (Document in seconds) 3.31       Knee issues so hard to get up and after moving then she is fine.        8/29/2024   Activities of Daily Living- Home Safety   Needs help with the following daily activites None of the above   Safety concerns in the home None of the above            8/29/2024   Dental   Dentist two times every year? Yes            8/29/2024   Hearing Screening   Hearing concerns? (!) TROUBLE UNDERSTANDING SOFT OR WHISPERED SPEECH.            8/29/2024   Driving Risk Screening   Patient/family members have concerns about driving No            8/29/2024   General Alertness/Fatigue Screening   Have you been more tired than usual lately? No            8/29/2024   Urinary Incontinence Screening   Bothered by leaking urine in past 6 months Yes            8/29/2024   TB Screening   Were you born outside of the [place]? No            Today's PHQ-2 Score:       8/29/2024     4:57 AM   PHQ-2 ( 1999  [employer])   Q1: Little interest or pleasure in doing things 0   Q2: Feeling down, depressed or hopeless 0   PHQ-2 Score 0   Q1: Little interest or pleasure in doing things Not at all   Q2: Feeling down, depressed or hopeless Not at all   PHQ-2 Score 0           8/29/2024   Substance Use   Alcohol more than 3/day or more than 7/wk Not Applicable   Do you have a current opioid prescription? No   How severe/bad is pain from 1 to 10? 1/10   Do you use any other substances recreationally? No        Social History     Tobacco Use    Smoking status: Never    Smokeless tobacco: Never   Vaping Use    Vaping status: Never Used   Substance Use Topics    Alcohol use: No    Drug use: No           1/15/2024   LAST FHS-7 RESULTS   1st degree relative breast or ovarian cancer No   Any relative bilateral breast cancer No   Any male have breast cancer No   Any ONE woman have BOTH breast AND ovarian cancer No   Any woman with breast cancer before 50yrs No   2 or more relatives with breast AND/OR ovarian cancer No   2 or more relatives with breast AND/OR bowel cancer No      Mammogram Screening - Mammogram every 1-2 years updated in Health Maintenance based on mutual decision making      History of abnormal Pap smear: No - age 65 or older with adequate negative prior screening test results (3 consecutive negative cytology results, 2 consecutive negative cotesting results, or 2 consecutive negative HrHPV test results within 10 years, with the most recent test occurring within the recommended screening interval for the test used)        Latest Ref Rng & Units 8/14/2019     1:08 PM 8/14/2019    12:50 PM 7/23/2014    12:00 AM   PAP / HPV   PAP (Historical)   NIL  NIL    HPV 16 DNA NEG^Negative Negative      HPV 18 DNA NEG^Negative Negative      Other HR HPV NEG^Negative Negative        ASCVD Risk   The 10-year ASCVD risk score (Rosalee SHEPPARD, et al., 2019) is: 8.1%    Values used to calculate the score:      Age: 69 years      Sex:  Female      Is Non- : No      Diabetic: No      Tobacco smoker: No      Systolic Blood Pressure: 128 mmHg      Is BP treated: No      HDL Cholesterol: 60 mg/dL      Total Cholesterol: 181 mg/dL    Reviewed and updated as needed this visit by Provider   Tobacco  Allergies  Meds  Problems  Med Hx  Surg Hx  Fam Hx  Soc   Hx Sexual Activity          Past Medical History:   Diagnosis Date    Acquired hypothyroidism 9/26/2012     Problem list name updated by automated process. Provider to review    Early menopause     ovarian failure at age 36     Endometriosis     Osteopenia     PSVT (paroxysmal supraventricular tachycardia) (H24) 9/26/2012     Past Surgical History:   Procedure Laterality Date    BREAST SURGERY Left 1981    benign cyst     COLONOSCOPY  2015    repeat in 10 yrs     ovary removed Left 1986    partial removal of right ovary 1990    right ovary removed       Lab work is in process  Labs reviewed in EPIC  BP Readings from Last 3 Encounters:   08/29/24 128/62   03/27/24 126/70   03/19/24 (!) 145/76    Wt Readings from Last 3 Encounters:   08/29/24 110.2 kg (243 lb)   03/19/24 111.1 kg (245 lb)   03/11/24 111.1 kg (245 lb)                  Patient Active Problem List   Diagnosis    Acquired hypothyroidism    Easy bruising    Osteopenia after menopause    Pigmented skin lesion    SK (seborrheic keratosis)    Seborrheic dermatitis    Hyperlipidemia    Class 2 severe obesity due to excess calories with serious comorbidity in adult (H)     Past Surgical History:   Procedure Laterality Date    BREAST SURGERY Left 1981    benign cyst     COLONOSCOPY  2015    repeat in 10 yrs     ovary removed Left 1986    partial removal of right ovary 1990    right ovary removed         Social History     Tobacco Use    Smoking status: Never    Smokeless tobacco: Never   Substance Use Topics    Alcohol use: No     Family History   Problem Relation Age of Onset    Hypertension Mother      Cerebrovascular Disease Mother     Macular Degeneration Mother     Cancer Father         Lung cancer    Respiratory Father         COPD    No Known Problems Sister     Thyroid Disease Sister     Thyroid Disease Sister     No Known Problems Sister     Cancer Brother         Lung cancer    No Known Problems Brother     No Known Problems Brother     No Known Problems Brother     Colon Cancer Brother     No Known Problems Maternal Grandmother     No Known Problems Maternal Grandfather     No Known Problems Paternal Grandmother     No Known Problems Paternal Grandfather     Amblyopia No family hx of     Retinal detachment No family hx of     Glaucoma No family hx of          Current Outpatient Medications   Medication Sig Dispense Refill    albuterol (PROAIR HFA/PROVENTIL HFA/VENTOLIN HFA) 108 (90 Base) MCG/ACT inhaler Inhale 2 puffs into the lungs every 6 hours as needed for shortness of breath 18 g 0    B Complex Vitamins (B COMPLEX 1 PO) Take 1 tablet by mouth daily       Calcium Carbonate Antacid (CALCIUM CARBONATE PO)       Cholecalciferol (VITAMIN D-3 PO) Take 2,000 Int'l Units by mouth every evening      levothyroxine (SYNTHROID/LEVOTHROID) 125 MCG tablet Take 1 tablet (125 mcg) by mouth daily 90 tablet 3    rosuvastatin (CRESTOR) 5 MG tablet Take 1 tablet (5 mg) by mouth daily 90 tablet 3    VALTREX 500 MG tablet Take 1 tablet (500 mg) by mouth 2 times daily 30 tablet 3     No Known Allergies  Recent Labs   Lab Test 01/04/24  0800 11/22/23  0842 10/06/23  1259 10/06/23  1259 04/21/22  1121 12/18/20  1549 09/03/19  0901 08/14/19  1319 12/15/17  1756   A1C  --  5.6  --   --   --   --   --   --   --    LDL  --   --   --  87 105* 81  --  142*  --    HDL  --   --   --  60 76 92  --  69  --    TRIG  --   --   --  172* 171* 78  --  112  --    ALT  --   --   --   --   --   --   --   --  25   CR  --   --   --   --  0.93 0.84  --  0.90 0.97   GFRESTIMATED  --   --   --   --  67 73  --  67 58*   GFRESTBLACK  --   --   --    --   --  84  --  78 70   POTASSIUM  --   --   --   --  4.7 3.8  --  5.0 3.6   TSH 2.59 4.22*   < > 10.93* 3.30  --    < > 15.25*  --     < > = values in this interval not displayed.      Current providers sharing in care for this patient include:  Patient Care Team:  Coleen Smith MD PhD as PCP - General (Family Medicine)  Erin Worthy NP as Assigned PCP  Mariya Judge AuD as Audiologist (Audiology)  Dewey Cobb MD as MD (Otolaryngology)    The following health maintenance items are reviewed in Epic and correct as of today:  Health Maintenance   Topic Date Due    COVID-19 Vaccine (6 - 2023-24 season) 02/12/2024    INFLUENZA VACCINE (1) 09/01/2024    LIPID  10/06/2024    ANNUAL REVIEW OF HM ORDERS  10/12/2024    TSH W/FREE T4 REFLEX  01/04/2025    MAMMO SCREENING  01/15/2025    GLUCOSE  04/21/2025    DTAP/TDAP/TD IMMUNIZATION (2 - Td or Tdap) 08/05/2025    MEDICARE ANNUAL WELLNESS VISIT  08/29/2025    FALL RISK ASSESSMENT  08/29/2025    COLORECTAL CANCER SCREENING  10/27/2026    ADVANCE CARE PLANNING  08/29/2029    DEXA  05/25/2037    HEPATITIS C SCREENING  Completed    PHQ-2 (once per calendar year)  Completed    Pneumococcal Vaccine: 65+ Years  Completed    ZOSTER IMMUNIZATION  Completed    RSV VACCINE (Pregnancy & 60+)  Completed    HPV IMMUNIZATION  Aged Out    MENINGITIS IMMUNIZATION  Aged Out    RSV MONOCLONAL ANTIBODY  Aged Out    PAP  Discontinued         Review of Systems  CONSTITUTIONAL: NEGATIVE for fever, chills, change in weight  INTEGUMENTARY/SKIN: POSITIVE for benign skin lesion on left upper arm  EYES: NEGATIVE for vision changes or irritation  ENT/MOUTH: NEGATIVE for ear, mouth and throat problems  RESP: NEGATIVE for significant cough or SOB  BREAST: NEGATIVE for masses, tenderness or discharge  CV: NEGATIVE for chest pain, palpitations or peripheral edema  GI: NEGATIVE for nausea, abdominal pain, heartburn, or change in bowel habits   female: leaking urine if holding  "urine too long  MUSCULOSKELETAL:POSITIVE  for DJD in bilateral knees and right hip anterior pain with getting up but then improves, likely DJD  NEURO: NEGATIVE for weakness, dizziness or paresthesias  ENDOCRINE: NEGATIVE for temperature intolerance, skin/hair changes  HEME: NEGATIVE for bleeding problems  PSYCHIATRIC: NEGATIVE for changes in mood or affect     Objective    Exam  /62   Pulse 66   Temp 97.3  F (36.3  C) (Tympanic)   Resp 16   Ht 1.724 m (5' 7.87\")   Wt 110.2 kg (243 lb)   SpO2 98%   BMI 37.09 kg/m     Estimated body mass index is 37.09 kg/m  as calculated from the following:    Height as of this encounter: 1.724 m (5' 7.87\").    Weight as of this encounter: 110.2 kg (243 lb).    Physical Exam  GENERAL: alert and no distress  EYES: Eyes grossly normal to inspection, PERRL and conjunctivae and sclerae normal  HENT: ear canals and TM's normal, nose and mouth without ulcers or lesions  NECK: no adenopathy, no asymmetry, masses, or scars  RESP: lungs clear to auscultation - no rales, rhonchi or wheezes  CV: regular rate and rhythm, normal S1 S2, no S3 or S4, no murmur, click or rub, no peripheral edema  ABDOMEN: soft, nontender, no hepatosplenomegaly, no masses and bowel sounds normal  MS: no gross musculoskeletal defects noted, no edema  SKIN: no suspicious lesions or rashes  NEURO: Normal strength and tone, mentation intact and speech normal  PSYCH: mentation appears normal, affect normal/bright  LYMPH: normal ant/post cervical, supraclavicular nodes         8/29/2024   Mini Cog   Clock Draw Score 2 Normal   3 Item Recall 3 objects recalled   Mini Cog Total Score 5        Signed Electronically by: Erin Worthy NP    "

## 2024-08-29 NOTE — PATIENT INSTRUCTIONS
Try glucosamine-Chondroitin supplement daily to see if this helps with knees and right hip for degenerative changes.    Patient Education   Preventive Care Advice   This is general advice given by our system to help you stay healthy. However, your care team may have specific advice just for you. Please talk to your care team about your preventive care needs.  Nutrition  Eat 5 or more servings of fruits and vegetables each day.  Try wheat bread, brown rice and whole grain pasta (instead of white bread, rice, and pasta).  Get enough calcium and vitamin D. Check the label on foods and aim for 100% of the RDA (recommended daily allowance).  Lifestyle  Exercise at least 150 minutes each week  (30 minutes a day, 5 days a week).  Do muscle strengthening activities 2 days a week. These help control your weight and prevent disease.  No smoking.  Wear sunscreen to prevent skin cancer.  Have a dental exam and cleaning every 6 months.  Yearly exams  See your health care team every year to talk about:  Any changes in your health.  Any medicines your care team has prescribed.  Preventive care, family planning, and ways to prevent chronic diseases.  Shots (vaccines)   HPV shots (up to age 26), if you've never had them before.  Hepatitis B shots (up to age 59), if you've never had them before.  COVID-19 shot: Get this shot when it's due.  Flu shot: Get a flu shot every year.  Tetanus shot: Get a tetanus shot every 10 years.  Pneumococcal, hepatitis A, and RSV shots: Ask your care team if you need these based on your risk.  Shingles shot (for age 50 and up)  General health tests  Diabetes screening:  Starting at age 35, Get screened for diabetes at least every 3 years.  If you are younger than age 35, ask your care team if you should be screened for diabetes.  Cholesterol test: At age 39, start having a cholesterol test every 5 years, or more often if advised.  Bone density scan (DEXA): At age 50, ask your care team if you should  have this scan for osteoporosis (brittle bones).  Hepatitis C: Get tested at least once in your life.  STIs (sexually transmitted infections)  Before age 24: Ask your care team if you should be screened for STIs.  After age 24: Get screened for STIs if you're at risk. You are at risk for STIs (including HIV) if:  You are sexually active with more than one person.  You don't use condoms every time.  You or a partner was diagnosed with a sexually transmitted infection.  If you are at risk for HIV, ask about PrEP medicine to prevent HIV.  Get tested for HIV at least once in your life, whether you are at risk for HIV or not.  Cancer screening tests  Cervical cancer screening: If you have a cervix, begin getting regular cervical cancer screening tests starting at age 21.  Breast cancer scan (mammogram): If you've ever had breasts, begin having regular mammograms starting at age 40. This is a scan to check for breast cancer.  Colon cancer screening: It is important to start screening for colon cancer at age 45.  Have a colonoscopy test every 10 years (or more often if you're at risk) Or, ask your provider about stool tests like a FIT test every year or Cologuard test every 3 years.  To learn more about your testing options, visit:   .  For help making a decision, visit:   https://bit.ly/oy26882.  Prostate cancer screening test: If you have a prostate, ask your care team if a prostate cancer screening test (PSA) at age 55 is right for you.  Lung cancer screening: If you are a current or former smoker ages 50 to 80, ask your care team if ongoing lung cancer screenings are right for you.  For informational purposes only. Not to replace the advice of your health care provider. Copyright   2023 [employer]. All rights reserved. Clinically reviewed by the [facility]. [employer] 779154 - REV 01/24.  Preventing Falls: Care Instructions  Injuries and health problems such as trouble walking  or poor eyesight can increase your risk of falling. So can some medicines. But there are things you can do to help prevent falls. You can exercise to get stronger. You can also arrange your home to make it safer.    Talk to your doctor about the medicines you take. Ask if any of them increase the risk of falls and whether they can be changed or stopped.   Try to exercise regularly. It can help improve your strength and balance. This can help lower your risk of falling.     Practice fall safety and prevention.    Wear low-heeled shoes that fit well and give your feet good support. Talk to your doctor if you have foot problems that make this hard.  Carry a cellphone or wear a medical alert device that you can use to call for help.  Use stepladders instead of chairs to reach high objects. Don't climb if you're at risk for falls. Ask for help, if needed.  Wear the correct eyeglasses, if you need them.    Make your home safer.    Remove rugs, cords, clutter, and furniture from walkways.  Keep your house well lit. Use night-lights in hallways and bathrooms.  Install and use sturdy handrails on stairways.  Wear nonskid footwear, even inside. Don't walk barefoot or in socks without shoes.    Be safe outside.    Use handrails, curb cuts, and ramps whenever possible.  Keep your hands free by using a shoulder bag or backpack.  Try to walk in well-lit areas. Watch out for uneven ground, changes in pavement, and debris.  Be careful in the winter. Walk on the grass or gravel when sidewalks are slippery. Use de-icer on steps and walkways. Add non-slip devices to shoes.    Put grab bars and nonskid mats in your shower or tub and near the toilet. Try to use a shower chair or bath bench when bathing.   Get into a tub or shower by putting in your weaker leg first. Get out with your strong side first. Have a phone or medical alert device in the bathroom with you.   Where can you learn more?  Go to  "https://www.[employer].net/patiented  Enter G117 in the search box to learn more about \"Preventing Falls: Care Instructions.\"  Current as of: July 17, 2023               Content Version: 14.0    8524-5841 [employer].   Care instructions adapted under license by your healthcare professional. If you have questions about a medical condition or this instruction, always ask your healthcare professional. [employer] disclaims any warranty or liability for your use of this information.      Hearing Loss: Care Instructions  Overview     Hearing loss is a sudden or slow decrease in how well you hear. It can range from slight to profound. Permanent hearing loss can occur with aging. It also can happen when you are exposed long-term to loud noise. Examples include listening to loud music, riding motorcycles, or being around other loud machines.  Hearing loss can affect your work and home life. It can make you feel lonely or depressed. You may feel that you have lost your independence. But hearing aids and other devices can help you hear better and feel connected to others.  Follow-up care is a key part of your treatment and safety. Be sure to make and go to all appointments, and call your doctor if you are having problems. It's also a good idea to know your test results and keep a list of the medicines you take.  How can you care for yourself at home?  Avoid loud noises whenever possible. This helps keep your hearing from getting worse.  Always wear hearing protection around loud noises.  Wear a hearing aid as directed.  A professional can help you pick a hearing aid that will work best for you.  You can also get hearing aids over the counter for mild to moderate hearing loss.  Have hearing tests as your doctor suggests. They can show whether your hearing has changed. Your hearing aid may need to be adjusted.  Use other devices as needed. These may include:  Telephone amplifiers and hearing aids that " "can connect to a television, stereo, radio, or microphone.  Devices that use lights or vibrations. These alert you to the doorbell, a ringing telephone, or a baby monitor.  Television closed-captioning. This shows the words at the bottom of the screen. Most new TVs can do this.  TTY (text telephone). This lets you type messages back and forth on the telephone instead of talking or listening. These devices are also called TDD. When messages are typed on the keyboard, they are sent over the phone line to a receiving TTY. The message is shown on a monitor.  Use text messaging, social media, and email if it is hard for you to communicate by telephone.  Try to learn a listening technique called speechreading. It is not lipreading. You pay attention to people's gestures, expressions, posture, and tone of voice. These clues can help you understand what a person is saying. Face the person you are talking to, and have them face you. Make sure the lighting is good. You need to see the other person's face clearly.  Think about counseling if you need help to adjust to your hearing loss.  When should you call for help?  Watch closely for changes in your health, and be sure to contact your doctor if:    You think your hearing is getting worse.     You have new symptoms, such as dizziness or nausea.   Where can you learn more?  Go to https://www.[employer].net/patiented  Enter R798 in the search box to learn more about \"Hearing Loss: Care Instructions.\"  Current as of: September 27, 2023               Content Version: 14.0    3723-7674 [employer].   Care instructions adapted under license by your healthcare professional. If you have questions about a medical condition or this instruction, always ask your healthcare professional. [employer] disclaims any warranty or liability for your use of this information.      Bladder Training: Care Instructions  Your Care Instructions     Bladder training is used to " treat urge incontinence and stress incontinence. Urge incontinence means that the need to urinate comes on so fast that you can't get to a toilet in time. Stress incontinence means that you leak urine because of pressure on your bladder. For example, it may happen when you laugh, cough, or lift something heavy.  Bladder training can increase how long you can wait before you have to urinate. It can also help your bladder hold more urine. And it can give you better control over the urge to urinate.  It is important to remember that bladder training takes a few weeks to a few months to make a difference. You may not see results right away, but don't give up.  Follow-up care is a key part of your treatment and safety. Be sure to make and go to all appointments, and call your doctor if you are having problems. It's also a good idea to know your test results and keep a list of the medicines you take.  How can you care for yourself at home?  Work with your doctor to come up with a bladder training program that is right for you. You may use one or more of the following methods.  Delayed urination  In the beginning, try to keep from urinating for 5 minutes after you first feel the need to go.  While you wait, take deep, slow breaths to relax. Kegel exercises can also help you delay the need to go to the bathroom.  After some practice, when you can easily wait 5 minutes to urinate, try to wait 10 minutes before you urinate.  Slowly increase the waiting period until you are able to control when you have to urinate.  Scheduled urination  Empty your bladder when you first wake up in the morning.  Schedule times throughout the day when you will urinate.  Start by going to the bathroom every hour, even if you don't need to go.  Slowly increase the time between trips to the bathroom.  When you have found a schedule that works well for you, keep doing it.  If you wake up during the night and have to urinate, do it. Apply your  "schedule to waking hours only.  Kegel exercises  These tighten and strengthen pelvic muscles, which can help you control the flow of urine. (If doing these exercises causes pain, stop doing them and talk with your doctor.) To do Kegel exercises:  Squeeze your muscles as if you were trying not to pass gas. Or squeeze your muscles as if you were stopping the flow of urine. Your belly, legs, and buttocks shouldn't move.  Hold the squeeze for 3 seconds, then relax for 5 to 10 seconds.  Start with 3 seconds, then add 1 second each week until you are able to squeeze for 10 seconds.  Repeat the exercise 10 times a session. Do 3 to 8 sessions a day.  When should you call for help?  Watch closely for changes in your health, and be sure to contact your doctor if:    Your incontinence is getting worse.     You do not get better as expected.   Where can you learn more?  Go to https://www.[employer].net/patiented  Enter V684 in the search box to learn more about \"Bladder Training: Care Instructions.\"  Current as of: November 15, 2023               Content Version: 14.0    3576-2084 [employer]   Care instructions adapted under license by your healthcare professional. If you have questions about a medical condition or this instruction, always ask your healthcare professional. [employer] disclaims any warranty or liability for your use of this information.         "

## 2024-11-04 ENCOUNTER — MYC REFILL (OUTPATIENT)
Dept: FAMILY MEDICINE | Facility: CLINIC | Age: 69
End: 2024-11-04
Payer: MEDICARE

## 2024-11-04 DIAGNOSIS — R06.02 SHORTNESS OF BREATH: ICD-10-CM

## 2024-11-04 RX ORDER — ALBUTEROL SULFATE 90 UG/1
2 INHALANT RESPIRATORY (INHALATION) EVERY 6 HOURS PRN
Qty: 18 G | Refills: 0 | Status: SHIPPED | OUTPATIENT
Start: 2024-11-04

## 2024-11-14 ENCOUNTER — MYC REFILL (OUTPATIENT)
Dept: FAMILY MEDICINE | Facility: CLINIC | Age: 69
End: 2024-11-14
Payer: MEDICARE

## 2024-11-14 DIAGNOSIS — B00.1 RECURRENT COLD SORES: ICD-10-CM

## 2024-11-19 RX ORDER — VALACYCLOVIR HYDROCHLORIDE 500 MG/1
500 TABLET, FILM COATED ORAL 2 TIMES DAILY
Qty: 30 TABLET | OUTPATIENT
Start: 2024-11-19

## 2024-11-19 NOTE — TELEPHONE ENCOUNTER
VALTREX 500 MG tablet   Last Written Prescription Date:  12/18/17  Last Fill Quantity: 30,   # refills: 0  Last Office Visit : 5/25/22  Future Office visit:  None    Routing refill request to provider for review/approval because:  Overdue for appointment >18months  Still PCC patient?

## 2024-11-21 ENCOUNTER — OFFICE VISIT (OUTPATIENT)
Dept: URGENT CARE | Facility: URGENT CARE | Age: 69
End: 2024-11-21
Payer: MEDICARE

## 2024-11-21 ENCOUNTER — ANCILLARY PROCEDURE (OUTPATIENT)
Dept: GENERAL RADIOLOGY | Facility: CLINIC | Age: 69
End: 2024-11-21
Payer: MEDICARE

## 2024-11-21 VITALS
HEART RATE: 74 BPM | TEMPERATURE: 97.9 F | DIASTOLIC BLOOD PRESSURE: 73 MMHG | OXYGEN SATURATION: 99 % | WEIGHT: 248 LBS | SYSTOLIC BLOOD PRESSURE: 145 MMHG | RESPIRATION RATE: 16 BRPM | BODY MASS INDEX: 37.85 KG/M2

## 2024-11-21 DIAGNOSIS — J44.1 COPD EXACERBATION (H): ICD-10-CM

## 2024-11-21 DIAGNOSIS — R05.1 ACUTE COUGH: Primary | ICD-10-CM

## 2024-11-21 DIAGNOSIS — R05.1 ACUTE COUGH: ICD-10-CM

## 2024-11-21 LAB
BASOPHILS # BLD AUTO: 0 10E3/UL (ref 0–0.2)
BASOPHILS NFR BLD AUTO: 0 %
EOSINOPHIL # BLD AUTO: 0.4 10E3/UL (ref 0–0.7)
EOSINOPHIL NFR BLD AUTO: 4 %
ERYTHROCYTE [DISTWIDTH] IN BLOOD BY AUTOMATED COUNT: 13.8 % (ref 10–15)
HCT VFR BLD AUTO: 37.6 % (ref 35–47)
HGB BLD-MCNC: 12.3 G/DL (ref 11.7–15.7)
IMM GRANULOCYTES # BLD: 0 10E3/UL
IMM GRANULOCYTES NFR BLD: 0 %
LYMPHOCYTES # BLD AUTO: 3.6 10E3/UL (ref 0.8–5.3)
LYMPHOCYTES NFR BLD AUTO: 36 %
MCH RBC QN AUTO: 28.3 PG (ref 26.5–33)
MCHC RBC AUTO-ENTMCNC: 32.7 G/DL (ref 31.5–36.5)
MCV RBC AUTO: 86 FL (ref 78–100)
MONOCYTES # BLD AUTO: 0.6 10E3/UL (ref 0–1.3)
MONOCYTES NFR BLD AUTO: 6 %
NEUTROPHILS # BLD AUTO: 5.4 10E3/UL (ref 1.6–8.3)
NEUTROPHILS NFR BLD AUTO: 54 %
PLATELET # BLD AUTO: 203 10E3/UL (ref 150–450)
RBC # BLD AUTO: 4.35 10E6/UL (ref 3.8–5.2)
WBC # BLD AUTO: 10 10E3/UL (ref 4–11)

## 2024-11-21 RX ORDER — PREDNISONE 20 MG/1
20 TABLET ORAL DAILY
Qty: 5 TABLET | Refills: 0 | Status: SHIPPED | OUTPATIENT
Start: 2024-11-21 | End: 2024-11-26

## 2024-11-21 RX ORDER — FLUTICASONE PROPIONATE 110 UG/1
1 AEROSOL, METERED RESPIRATORY (INHALATION) 2 TIMES DAILY
Qty: 12 G | Refills: 0 | Status: SHIPPED | OUTPATIENT
Start: 2024-11-21

## 2024-11-22 DIAGNOSIS — J44.9 COPD (CHRONIC OBSTRUCTIVE PULMONARY DISEASE) (H): Primary | ICD-10-CM

## 2024-11-22 NOTE — PROGRESS NOTES
URGENT CARE  Assessment & Plan   Assessment:   Elizabeth Trevñio is a 69 year old female who's clinical presentation today is consistent with:   1. Acute cough   - XR Chest 2 Views; Future  - CBC with platelets and differential;    2. COPD exacerbation   - predniSONE (DELTASONE) 20 MG tablet;   - fluticasone (FLOVENT HFA) 110 MCG/ACT inhaler;   - Adult Pulmonary Medicine  Referral; Future  Plan:  Will treat patient today for acute cough, (suspicious for copd exacerbation) supportively and symptomatically. Patient's chest xray and labs (specifically cbc) were reassuring}, no suspicion (low likelihood) for bacterial infectious lung etiology, patient does not appear to need antibiotics. Will attempt to alleviate patient's symptoms today w/ oral steroids and inhalers; side effects of medications reviewed. Given hyperinflation of upper lobes suspicious for copd/emphysema; patient has never been diagnosed before, would like her to follow up with pulmonology for official testing and diagnosis   Additionally we discussed if symptoms do not improve after starting today's treatment to follow up in 7-10 days, sooner if symptoms worsen, return precautions given  No alarm signs or symptoms present   Differential Diagnoses for this patient's chief complaint that I considered include:   Bacterial vs viral etiology of cough, pharyngitis, pneumonia, bronchitis, pertussis, pulmonary embolism, allergic rhinitis/PND/UACS, asthma/COPD exacerbation, postinfectious cough     Patient is agreeable to treatment plan and state they will follow-up if symptoms do not improve and/or if symptoms worsen   see patient's AVS 'monitor for' section for specific patient instructions given and discussed regarding what to watch for and when to follow up    SA[facility] [place]      ______________________________________________________________________      Subjective     HPI: Elizabeth Treviño  is a 69 year old   female who presents today for evaluation the following concerns:   Patient  endorses a cough today which they state they have had for 5-6 weeks    Patient reports she has never been diagnosed with asthma or copd but has an inhaler for wheezing which helps. Patient here today b/c cough is lingering and family worried about pneumonia; Patient denies any fevers,  shortness of breath, difficulty breathing  Does endorse wheezing and chest congestion, has a hard time coughing anything up     Review of Systems:  Pertinent review of systems as reflected in HPI, otherwise negative.     Objective    Physical Exam:  Vitals:    11/21/24 1804   BP: (!) 145/73   Pulse: 74   Resp: 16   Temp: 97.9  F (36.6  C)   TempSrc: Tympanic   SpO2: 99%   Weight: 112.5 kg (248 lb)      General: Alert and oriented, no acute distress, Vital signs reviewed: afebrile,  normotensive  Psy/mental status: Cooperative, nonanxious  SKIN: Intact, no rashes  EYES: EOMs intact, PERRLA bilaterally   Conjunctiva: Clear bilaterally, no injection or erythema present  EARS: TMs intact, translucent gray in color with normal landmarks present no erythema  or bulging tympanic membrane   Canals are without swelling, however have a mild amount of cerumen, no impaction  NOSE:  mucosa moist               No frontal or maxillary sinus tenderness present bilaterally  MOUTH/THROAT: lips, tongue, & oral mucosa appear normal upon inspection                Posterior oropharynx is erythematous but without exudate, lesions or tonsillar  Edema, no dysphonia, no unilateral tonsillar edema, no uvular deviation,   no signs of peritonsillar abscess  NECK: supple, has full range of motion with no meningeal signs              No lymphadenopathy present  LUNG: normal work of breathing, good respiratory effort without retractions, good air  movement, non labored, inspection reveals normal chest expansion w/  inspiration            Lung sounds are clear to auscultation bilaterally,             No rales/rhonic/crackles wheezing noted           No cough noted     LABS:   Results for orders placed or performed in visit on 11/21/24   CBC with platelets and differential     Status: None   Result Value Ref Range    WBC Count 10.0 4.0 - 11.0 10e3/uL    RBC Count 4.35 3.80 - 5.20 10e6/uL    Hemoglobin 12.3 11.7 - 15.7 g/dL    Hematocrit 37.6 35.0 - 47.0 %    MCV 86 78 - 100 fL    MCH 28.3 26.5 - 33.0 pg    MCHC 32.7 31.5 - 36.5 g/dL    RDW 13.8 10.0 - 15.0 %    Platelet Count 203 150 - 450 10e3/uL    % Neutrophils 54 %    % Lymphocytes 36 %    % Monocytes 6 %    % Eosinophils 4 %    % Basophils 0 %    % Immature Granulocytes 0 %    Absolute Neutrophils 5.4 1.6 - 8.3 10e3/uL    Absolute Lymphocytes 3.6 0.8 - 5.3 10e3/uL    Absolute Monocytes 0.6 0.0 - 1.3 10e3/uL    Absolute Eosinophils 0.4 0.0 - 0.7 10e3/uL    Absolute Basophils 0.0 0.0 - 0.2 10e3/uL    Absolute Immature Granulocytes 0.0 <=0.4 10e3/uL   CBC with platelets and differential     Status: None    Narrative    The following orders were created for panel order CBC with platelets and differential.  Procedure                               Abnormality         Status                     ---------                               -----------         ------                     CBC with platelets and d...[[id number]]                      Final result                 Please view results for these tests on the individual orders.   Results for orders placed or performed in visit on 11/21/24   XR Chest 2 Views     Status: None    Narrative    EXAM: XR CHEST 2 VIEWS  LOCATION: [facility]  DATE: 11/21/2024    INDICATION: Cough.  COMPARISON: 11/15/2019      Impression    IMPRESSION: Heart size and vascularity are normal. Lungs are hyperinflated with upper lung predominant emphysema. No focal consolidation, pneumothorax nor pleural effusion.       Imaging:   All images were personally read by this provider (myself).   Per my independent  interpretation the xray shows no signs of consolidation or infiltrates suggesting pneumonia, hyperinflation in upper lobes consistent with emphysema      ______________________________________________________________________    I explained my diagnostic considerations and recommendations to the patient, who voiced understanding and agreement with the treatment plan.   All questions were answered.   We discussed potential side effects, risks and benefits of any prescribed or recommended therapies, as well as expectations for response to treatments.  Please see AVS for any patient instructions & handouts given.   Patient was advised to contact the Nurse Care Line, their Primary Care provider, Urgent Care, or the Emergency Department if there are new or worsening symptoms, or call 911 for emergencies.

## 2024-11-22 NOTE — PATIENT INSTRUCTIONS
Diagnosis: acute cough   Copd exacerbation   Chest xray- no signs of consolidation or infiltrates suggesting pneumonia, hyperinflation in upper lobes consistent with copd      Lab work - no elevated wbc count    No signs of a bacterial infection     Plan:   Copd - suspected     Start inhaler, steroid     REST   Drinking plenty of fluids,   Follow up with pulmonology       Monitor for:   Fever of 100.4 F ( 38.0 C) or higher, or as directed by your healthcare provider  Symptoms that get worse, or new symptoms  Symptoms that don't start to get better in 1 week  Trouble breathing that doesn't get better with treatment  Skin, lips, or nails that look blue, gray, or pale

## 2024-11-25 ENCOUNTER — TELEPHONE (OUTPATIENT)
Dept: URGENT CARE | Facility: URGENT CARE | Age: 69
End: 2024-11-25
Payer: MEDICARE

## 2024-11-25 NOTE — TELEPHONE ENCOUNTER
Retail Pharmacy Prior Authorization Team   Phone: [phone]    PA TEAM RECEIVED A QUESTION SET VIA FAX FROM [employer] SET LOCATED IN [facility] FOLDER

## 2024-11-25 NOTE — TELEPHONE ENCOUNTER
Retail Pharmacy Prior Authorization Team   Phone: [phone]    PA Initiation    Medication: FLUTICASONE PROPIONATE  MCG/ACT IN AERO  Insurance Company: [employer] - Phone [phone] Fax [phone]  Pharmacy Filling the Rx: [facility] [address] [place] [address] - [address]  Filling Pharmacy Phone: [phone]  Filling Pharmacy Fax:    Start Date: 11/25/2024    Filled out PA forms, manually faxed to [phone].

## 2024-11-26 NOTE — TELEPHONE ENCOUNTER
PRIOR AUTHORIZATION DENIED    Medication: FLUTICASONE PROPIONATE  MCG/ACT IN AERO  Insurance Company: [employer] - Phone [phone] Fax [phone]  Denial Date: 11/25/2024  Denial Rational:           Appeal Information:   If provider would like to appeal please review the plan's reasons for denial listed above. Please utilize that information to complete letter and provide specific, detailed clinical information/rationale of your patient's health status to address their denial reasons.      Patient Notified: No

## 2024-11-26 NOTE — TELEPHONE ENCOUNTER
MEDICATION APPEAL APPROVED    Medication: FLUTICASONE PROPIONATE  MCG/ACT IN AERO  Authorization Effective Date:  01/01/2024  Authorization Expiration Date:  12/31/2025  Approved Dose/Quantity:   Reference #:     Appeal Insurance Company:   Expected CoPay: $       CoPay Card Available:    Financial Assistance Needed:   Filling Pharmacy: [facility] [address] [place] [address] [address]  Patient Notified:   Comments:       Received another fax saying that an appeal has been approved for medication.   It still has a copay of over $267

## 2024-12-03 ENCOUNTER — MYC MEDICAL ADVICE (OUTPATIENT)
Dept: FAMILY MEDICINE | Facility: CLINIC | Age: 69
End: 2024-12-03
Payer: MEDICARE

## 2024-12-03 ENCOUNTER — HOSPITAL ENCOUNTER (OUTPATIENT)
Dept: RESPIRATORY THERAPY | Facility: CLINIC | Age: 69
Discharge: HOME | End: 2024-12-03
Attending: INTERNAL MEDICINE
Payer: MEDICARE

## 2024-12-03 DIAGNOSIS — J44.9 COPD (CHRONIC OBSTRUCTIVE PULMONARY DISEASE) (H): ICD-10-CM

## 2024-12-03 DIAGNOSIS — B00.1 RECURRENT COLD SORES: ICD-10-CM

## 2024-12-03 PROCEDURE — 271N000002 HC RX 271

## 2024-12-03 PROCEDURE — 94729 DIFFUSING CAPACITY: CPT

## 2024-12-03 PROCEDURE — 94060 EVALUATION OF WHEEZING: CPT

## 2024-12-03 PROCEDURE — 94726 PLETHYSMOGRAPHY LUNG VOLUMES: CPT

## 2024-12-03 RX ORDER — INHALER, ASSIST DEVICES
1 SPACER (EA) MISCELLANEOUS ONCE
Status: COMPLETED | OUTPATIENT
Start: 2024-12-03 | End: 2024-12-03

## 2024-12-03 RX ADMIN — Medication 1 EACH: at 10:14

## 2024-12-03 NOTE — TELEPHONE ENCOUNTER
See MyChart message; is PCP willing to refill Valtrex that was last ordered by previous PCP, or is a visit needed? Refill pended below.    Yadira Fisher RN  [facility]

## 2024-12-04 ENCOUNTER — OFFICE VISIT (OUTPATIENT)
Dept: PULMONOLOGY | Facility: CLINIC | Age: 69
End: 2024-12-04
Attending: INTERNAL MEDICINE
Payer: MEDICARE

## 2024-12-04 ENCOUNTER — TELEPHONE (OUTPATIENT)
Dept: PULMONOLOGY | Facility: CLINIC | Age: 69
End: 2024-12-04

## 2024-12-04 VITALS
WEIGHT: 248 LBS | HEART RATE: 77 BPM | SYSTOLIC BLOOD PRESSURE: 140 MMHG | OXYGEN SATURATION: 97 % | DIASTOLIC BLOOD PRESSURE: 72 MMHG | BODY MASS INDEX: 37.85 KG/M2

## 2024-12-04 DIAGNOSIS — R06.09 DOE (DYSPNEA ON EXERTION): ICD-10-CM

## 2024-12-04 DIAGNOSIS — J45.30 MILD PERSISTENT ASTHMA WITHOUT COMPLICATION: Primary | ICD-10-CM

## 2024-12-04 DIAGNOSIS — R06.02 SHORTNESS OF BREATH: ICD-10-CM

## 2024-12-04 DIAGNOSIS — Z91.09 ENVIRONMENTAL ALLERGIES: ICD-10-CM

## 2024-12-04 PROBLEM — J44.1 COPD EXACERBATION (H): Status: RESOLVED | Noted: 2024-11-21 | Resolved: 2024-12-04

## 2024-12-04 LAB
DLCOCOR-%PRED-PRE: 111 %
DLCOCOR-PRE: 22.9 ML/MIN/MMHG
DLCOUNC-%PRED-PRE: 107 %
DLCOUNC-PRE: 22.08 ML/MIN/MMHG
DLCOUNC-PRED: 20.48 ML/MIN/MMHG
ERV-%PRED-PRE: 39 %
ERV-PRE: 0.44 L
ERV-PRED: 1.12 L
EXPTIME-PRE: 9.14 SEC
FEF2575-%PRED-POST: 78 %
FEF2575-%PRED-PRE: 63 %
FEF2575-POST: 1.5 L/SEC
FEF2575-PRE: 1.22 L/SEC
FEF2575-PRED: 1.91 L/SEC
FEFMAX-%PRED-PRE: 100 %
FEFMAX-PRE: 6.15 L/SEC
FEFMAX-PRED: 6.15 L/SEC
FEV1-%PRED-PRE: 94 %
FEV1-PRE: 2.16 L
FEV1FEV6-PRE: 68 %
FEV1FEV6-PRED: 79 %
FEV1FVC-PRE: 67 %
FEV1FVC-PRED: 79 %
FEV1SVC-PRE: 67 %
FEV1SVC-PRED: 67 %
FIFMAX-PRE: 5.16 L/SEC
FRCPLETH-%PRED-PRE: 94 %
FRCPLETH-PRE: 2.73 L
FRCPLETH-PRED: 2.88 L
FVC-%PRED-PRE: 111 %
FVC-PRE: 3.25 L
FVC-PRED: 2.92 L
IC-%PRED-PRE: 125 %
IC-PRE: 2.8 L
IC-PRED: 2.23 L
RVPLETH-%PRED-PRE: 104 %
RVPLETH-PRE: 2.28 L
RVPLETH-PRED: 2.19 L
TLCPLETH-%PRED-PRE: 101 %
TLCPLETH-PRE: 5.53 L
TLCPLETH-PRED: 5.44 L
VA-%PRED-PRE: 103 %
VA-PRE: 5.2 L
VC-%PRED-PRE: 94 %
VC-PRE: 3.24 L
VC-PRED: 3.43 L

## 2024-12-04 PROCEDURE — 99204 OFFICE O/P NEW MOD 45 MIN: CPT | Performed by: INTERNAL MEDICINE

## 2024-12-04 RX ORDER — FLUTICASONE PROPIONATE AND SALMETEROL XINAFOATE 115; 21 UG/1; UG/1
2 AEROSOL, METERED RESPIRATORY (INHALATION) 2 TIMES DAILY
Qty: 12 G | Refills: 11 | Status: SHIPPED | OUTPATIENT
Start: 2024-12-04 | End: 2024-12-04

## 2024-12-04 RX ORDER — ALBUTEROL SULFATE 90 UG/1
2 INHALANT RESPIRATORY (INHALATION) EVERY 6 HOURS PRN
Qty: 18 G | Refills: 11 | Status: SHIPPED | OUTPATIENT
Start: 2024-12-04

## 2024-12-04 RX ORDER — FLUTICASONE FUROATE AND VILANTEROL 100; 25 UG/1; UG/1
1 POWDER RESPIRATORY (INHALATION) DAILY
Qty: 60 EACH | Refills: 11 | Status: SHIPPED | OUTPATIENT
Start: 2024-12-04

## 2024-12-04 RX ORDER — VALACYCLOVIR HYDROCHLORIDE 500 MG/1
500 TABLET, FILM COATED ORAL 2 TIMES DAILY
Qty: 30 TABLET | Refills: 1 | Status: SHIPPED | OUTPATIENT
Start: 2024-12-04

## 2024-12-04 NOTE — PROGRESS NOTES
Pulmonary New Clinic Visit       Visit Date 12/04/2024  MRN [id number]  PCP Coleen Smith    Chief Complaint   Patient presents with    Consult       Elizabeth Treviño is a 69 year old female referred for COPD       Subjective:   Recent symptoms:  +night time congestion, wheezing, with night time awakenings. Needs 20min in the middle for the night to clean up her lungs  Cough can be productive of clear mucus  Historically, symptoms triggered by yard work  Has been doing okay by avoiding yard work or sourcing it out, but had to do it this past October and has felt exacerbation since then  Also recently noted to have mold exposure from her attic from a roof leak, which has been since replaced.    After these exposures, ended up in urgent care and was prescribed flovent and prednisone (has not started either). Has not picked up Flovent, was not covered by insurance and waited on prednisone so she can do the PFT    Prior to this illness, typically has no respiratory symptoms at all as long as she avoids yard work. No former diagnosis of asthma or bronchitis. No respiratory hospitalizations.     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 Provider to review    Early menopause     ovarian failure at age 36     Endometriosis     Osteopenia     PSVT (paroxysmal supraventricular tachycardia) (H) 9/26/2012        Past  Surgical History:   Procedure Laterality Date    BREAST SURGERY Left 1981    benign cyst     COLONOSCOPY  2015    repeat in 10 yrs     ovary removed Left 1986    partial removal of right ovary 1990    right ovary removed          Family History   Problem Relation Age of Onset    Hypertension Mother     Cerebrovascular Disease Mother     Macular Degeneration Mother     Cancer Father         Lung cancer    Respiratory Father         COPD    No Known Problems Sister     Thyroid Disease Sister     Thyroid Disease Sister     No Known Problems Sister     Cancer Brother         Lung cancer    No Known Problems Brother     No Known Problems Brother     No Known Problems Brother     Colon Cancer Brother     No Known Problems Maternal Grandmother     No Known Problems Maternal Grandfather     No Known Problems Paternal Grandmother     No Known Problems Paternal Grandfather     Amblyopia No family hx of     Retinal detachment No family hx of     Glaucoma No family hx of               Current Outpatient Medications   Medication Sig Dispense Refill    albuterol (PROAIR HFA/PROVENTIL HFA/VENTOLIN HFA) 108 (90 Base) MCG/ACT inhaler Inhale 2 puffs into the lungs every 6 hours as needed for shortness of breath. 18 g 11    B Complex Vitamins (B COMPLEX 1 PO) Take 1 tablet by mouth daily       Calcium Carbonate Antacid (CALCIUM CARBONATE PO)       Cholecalciferol (VITAMIN D-3 PO) Take 2,000 Int'l Units by mouth every evening      fluticasone-salmeterol (ADVAIR HFA) 115-21 MCG/ACT inhaler Inhale 2 puffs into the lungs 2 times daily. 12 g 11    levothyroxine (SYNTHROID/LEVOTHROID) 125 MCG tablet Take 1 tablet (125 mcg) by mouth daily 90 tablet 3    rosuvastatin (CRESTOR) 5 MG tablet Take 1 tablet (5 mg) by mouth daily 90 tablet 3    VALTREX 500 MG tablet Take 1 tablet (500 mg) by mouth 2 times daily (Patient not taking: Reported on 12/4/2024) 30 tablet 3     No current facility-administered medications for this visit.            Objective:  Vitals:    12/04/24 0920   BP: (!) 140/72   BP Location: Left arm   Patient Position: Sitting   Cuff Size: Adult Large   Pulse: 77   SpO2: 97%   Weight: 112.5 kg (248 lb)          Physical Exam  Vitals reviewed.   Constitutional:       Appearance: Normal appearance.   Eyes:      General: No scleral icterus.  Cardiovascular:      Rate and Rhythm: Normal rate and regular rhythm.      Heart sounds: No murmur heard.  Pulmonary:      Effort: Pulmonary effort is normal. No respiratory distress.      Breath sounds: Wheezing and rhonchi present.   Musculoskeletal:         General: Deformity: no clubbing.      Right lower leg: No edema.      Left lower leg: No edema.   Neurological:      General: No focal deficit present.      Mental Status: She is alert and oriented to person, place, and time.   Psychiatric:         Mood and Affect: Mood normal.         Behavior: Behavior normal.         Thought Content: Thought content normal.         Judgment: Judgment normal.              PFTs:   Date 12/3/2024   Pre  Post  FVC 3.25 111% 3.35 +3%  FEV1 2.16 94% 2.29 +5%  Ratio 67  RV 2.28 104%   TLC 5.53 101%  DLCOu 107%  DLCOc 111%  Interpretation: Mild obstruction. Normal lung volumes and diffusion. No change post BD           Imaging:   Personally and independently reviewed following imaging with my own impression:   CXR 11/21/2024 (acute exacerbation setting): small calcified nodules, no effusions/masses/consolidations.       Cardiac/Echo/Caths:   None recent       Pertinent Labs:   Eos 400 11/2024      Assessment:   Obstructive lung disease, suspect reactive airways/asthma with now fixed obstruction, mild persistent, uncontrolled  Environmental allergies, active    Other considerations would be bronchiectasis, ABPA, etc.. If no respsonse to initial treatment, will expand workup with CT scans and labs  No clear risk factors for COPD. If further workup shows any emphysema will need alpha 1  screening.    Plan:  Start ICS/LABA, advair HFA BID, provided list of formulary alternatives  Continue albuterol PRN  Counseled on antihistamine use, non sedating meds preferred claritin/allegra/zyrtec. Avoid daily or chronic sudafed use        RTC 3 months     Elba Mascorro MD  12/04/2024 10:04 AM  Pulmonary & Critical Care        This note was completed using voice recognition software. Dictation errors may have been missed.     Future Appointments   Date Time Provider Department Center   12/4/2024  9:45 AM Elba Mascorro MD [facility]         CC: Copy sent to Coleen Smith & Kanika Benton through Epic and/or letter

## 2024-12-04 NOTE — TELEPHONE ENCOUNTER
Health Call Center    Phone Message    May a detailed message be left on voicemail: yes     Reason for Call: Medication Question or concern regarding medication   Prescription Clarification    Name of Medication: fluticasone-salmeterol (ADVAIR HFA) 115-21 MCG/ACT inhaler     Prescribing Provider: Elba Mascorro MD     Pharmacy: [facility] [address] [place] [address] - [address]     What on the order needs clarification? Per patient, this prescription is not covered by her insurance, but the Breo Ellipta would be. She was wondering if a new prescription for this could be sent to the pharmacy. Patient can be contacted back at [phone] with any questions.    Action Taken: Message routed to:  Other: PULM    Travel Screening: Not Applicable     Date of Service: 12/04/24

## 2024-12-23 DIAGNOSIS — E78.2 MIXED HYPERLIPIDEMIA: ICD-10-CM

## 2024-12-23 DIAGNOSIS — E03.9 ACQUIRED HYPOTHYROIDISM: ICD-10-CM

## 2024-12-23 RX ORDER — LEVOTHYROXINE SODIUM 125 UG/1
125 TABLET ORAL DAILY
Qty: 90 TABLET | Refills: 0 | Status: SHIPPED | OUTPATIENT
Start: 2024-12-23

## 2024-12-23 RX ORDER — ROSUVASTATIN CALCIUM 5 MG/1
5 TABLET, COATED ORAL DAILY
Qty: 90 TABLET | Refills: 0 | Status: SHIPPED | OUTPATIENT
Start: 2024-12-23

## 2024-12-23 NOTE — TELEPHONE ENCOUNTER
LDL Cholesterol Calculated   Date Value Ref Range Status   10/06/2023 87 <=100 mg/dL Final   12/18/2020 81 <100 mg/dL Final     Comment:     Desirable:       <100 mg/dl

## 2024-12-31 ENCOUNTER — MYC MEDICAL ADVICE (OUTPATIENT)
Dept: PULMONOLOGY | Facility: CLINIC | Age: 69
End: 2024-12-31
Payer: MEDICARE

## 2024-12-31 DIAGNOSIS — J45.30 MILD PERSISTENT ASTHMA WITHOUT COMPLICATION: Primary | ICD-10-CM

## 2025-01-02 ENCOUNTER — OFFICE VISIT (OUTPATIENT)
Dept: URGENT CARE | Facility: URGENT CARE | Age: 70
End: 2025-01-02
Payer: MEDICARE

## 2025-01-02 VITALS
BODY MASS INDEX: 37.7 KG/M2 | HEART RATE: 68 BPM | OXYGEN SATURATION: 99 % | DIASTOLIC BLOOD PRESSURE: 73 MMHG | TEMPERATURE: 97.8 F | SYSTOLIC BLOOD PRESSURE: 147 MMHG | RESPIRATION RATE: 16 BRPM | WEIGHT: 247 LBS

## 2025-01-02 DIAGNOSIS — R07.0 THROAT PAIN: ICD-10-CM

## 2025-01-02 DIAGNOSIS — H10.33 ACUTE CONJUNCTIVITIS OF BOTH EYES, UNSPECIFIED ACUTE CONJUNCTIVITIS TYPE: Primary | ICD-10-CM

## 2025-01-02 PROBLEM — J45.30 MILD PERSISTENT ASTHMA WITHOUT COMPLICATION: Status: ACTIVE | Noted: 2025-01-02

## 2025-01-02 LAB
DEPRECATED S PYO AG THROAT QL EIA: NEGATIVE
S PYO DNA THROAT QL NAA+PROBE: NOT DETECTED

## 2025-01-02 RX ORDER — POLYMYXIN B SULFATE AND TRIMETHOPRIM 1; 10000 MG/ML; [USP'U]/ML
1-2 SOLUTION OPHTHALMIC EVERY 4 HOURS
Qty: 10 ML | Refills: 0 | Status: SHIPPED | OUTPATIENT
Start: 2025-01-02 | End: 2025-01-09

## 2025-01-02 RX ORDER — FLUTICASONE PROPIONATE 110 UG/1
1 AEROSOL, METERED RESPIRATORY (INHALATION) 2 TIMES DAILY
Qty: 12 G | Refills: 11 | Status: SHIPPED | OUTPATIENT
Start: 2025-01-02

## 2025-01-02 NOTE — PROGRESS NOTES
URGENT CARE  Assessment & Plan   Assessment:   Elizabeth Treviño is a 69 year old female who's clinical presentation today is consistent with:   1. Acute conjunctivitis of both eyes, unspecified acute conjunctivitis type    - polymixin b-trimethoprim (POLYTRIM) 14908-7.1 UNIT/ML-% ophthalmic solution;   2. Throat pain  - Streptococcus A Rapid Screen w/Reflex to PCR   Plan:  Will treat patient's eye symptoms as a bacterial conjunctivitis with topical antibiotic therapy at this time as a precaution. However I suspect given patient's presentation and symptoms that it is more of a vial conjunctivitis and educated patient  on this, if no improvement after a few days of antibiotics therapy it is likely not bacterial}   Encouraged patient for pain they should use Ibuprofen and/or tylenol as needed. Additionally a cool or warm moist compresses over the affected eye, as needed may also help relieve the discomfort  Educated patient  on the infective/ contagious nature of this condition. Avoidance/restriction from work, school or  is not recommended, but I did encouraged strict hand washing, avoiding touching the eye, not sharing towels or bedding to prevent spread of infection. Discussed that direct contact with discharge is needed for spreading of infection   Additionally we discussed if symptoms do not improve after starting today's treatment (or if symptoms worsen) to follow up in 3-5 days.    No alarm signs or symptoms present   Differential Diagnoses for this patient's chief complaint that I considered include:  Conjunctivitis: bacterial vs viral or allergic, foreign body, Dry eye, blepharitis, corneal abrasion, corneal ulcer, keratitis, uveitis/iritis, acute angle glaucoma      Patient is agreeable to treatment plan and state they will follow-up if symptoms do not improve and/or if symptoms worsen   see patient's AVS 'monitor for' section for specific patient instructions given and discussed regarding what to watch  for and when to follow up    SAY Pulido CNP  [facility] [place]      ______________________________________________________________________      Subjective     HPI: Elizabeth Treviño  is a 69 year old  female who presents today for evaluation the following concerns:   Patient ,  presents for evaluation of both eyes that they describe as having increased  redness, having purulent drainage, mattering of eyelashes, and tearing  Patient denies any eye swelling or foreign body sensation  Patient states symptoms started yesterday, but has been sick with a cold for over a week    Patient denies any incident or trauma to the eyes  Patient denies wearing contacts.    Patient denies any Increasing eye pain or worsening/changes in vision/ visual acuity/ blurry vision  Patient denies any  photosensitively, pain with eye movement.   Patient also endorses throat pain for weeks, but does state she has been coughing due to her asthma     Review of Systems:  Pertinent review of systems as reflected in HPI, otherwise negative.     Objective    Physical Exam:  Vitals:    01/02/25 1514   BP: (!) 147/73   Pulse: 68   Resp: 16   Temp: 97.8  F (36.6  C)   TempSrc: Tympanic   SpO2: 99%   Weight: 112 kg (247 lb)      General: Alert and oriented, no acute distress, Vital signs reviewed: afebrile  Psy/mental status: Cooperative, nonanxious  SKIN: Intact, no rashes  EYES:   EOMs intact, PERRLA bilaterally   Conjunctiva: slight injection and erythema to conjunctiva of bilateral} eyes}.   Drainage: none noted   No photophobia noted     No visual acuity changes noted, no blur vision noted  NOSE:  mucosa moist  MOUTH/THROAT: lips, tongue, & oral mucosa appear normal upon inspection   LUNG: normal work of breathing, good respiratory effort without retractions, good air  movement, non labored, inspection reveals normal chest expansion w/  inspiration    ______________________________________________________________________    I explained my diagnostic considerations and recommendations to the patient, who voiced understanding and agreement with the treatment plan.   All questions were answered.   We discussed potential side effects, risks and benefits of any prescribed or recommended therapies, as well as expectations for response to treatments.  Please see AVS for any patient instructions & handouts given.   Patient was advised to contact the Nurse Care Line, their Primary Care provider, Urgent Care, or the Emergency Department if there are new or worsening symptoms, or call 911 for emergencies.

## 2025-01-02 NOTE — PATIENT INSTRUCTIONS
Diagnosis:   conjunctivitis    possibly bacterial conjunctivitis    Will treat with antibiotics topically    But if not better in the next 2-3 days then more likely a viral or allergic eye irritation  which will get better on its own}    Plan:   Antibiotic drops   Warm  compress   Tylenol or ibuprofen if painful   Antihistamines as needed -if lots of tearing is noted   Contagious - avoid touching eye   new guidelines/recommendation: No exclusions from /school or work   Specifically no touching eye/ eye drainage and touching other people's eyes or mucus membranes   If do need to use good hand hygiene   Spread via direct contact with discharge   Discard any eye makeup  Wash bedding- pillow cases, towels, washcloths     Monitor for:   Eyelid swells more  Eye pain gets worse  Redness or drainage from the eye gets worse  Blurry vision gets worse or you have increased sensitivity to light  Normal vision does not return within 24 to 48 hours         CONJUNCTIVITIS  - CAN BE VIRAL, BACTERIAL, OR ALLERGIC  The membrane that covers the white part of your eye (the conjunctiva) is inflamed.   Inflammation happens when your body responds to an injury, allergic reaction, infection, or illness.   Conjunctivitis can be caused by a bacteria (pink eye) or it can be viral from a cold   Symptoms of inflammation in the eye may include redness, irritation, itching, swelling, or burning.   These symptoms should go away within the next 24 hours.   Conjunctivitis may be related to a particle that was in your eye.   If so, it may wash out with your tears or irrigation treatment.

## 2025-01-02 NOTE — TELEPHONE ENCOUNTER
Elba Mascorro MD Hoops, Dawn, RN  Phone Number: [phone]     Yes flovent may be better, the dry powder formulation from [facility] could have caused her throat issues.  Can prescribe flovent 110 dose, 2 puffs BID, with a spacer preferably    Thanks  A

## 2025-03-04 ENCOUNTER — OFFICE VISIT (OUTPATIENT)
Dept: PULMONOLOGY | Facility: CLINIC | Age: 70
End: 2025-03-04
Attending: INTERNAL MEDICINE
Payer: MEDICARE

## 2025-03-04 VITALS
SYSTOLIC BLOOD PRESSURE: 142 MMHG | DIASTOLIC BLOOD PRESSURE: 80 MMHG | HEART RATE: 80 BPM | OXYGEN SATURATION: 99 % | BODY MASS INDEX: 38.46 KG/M2 | WEIGHT: 252 LBS

## 2025-03-04 DIAGNOSIS — J45.30 MILD PERSISTENT ASTHMA WITHOUT COMPLICATION: ICD-10-CM

## 2025-03-04 DIAGNOSIS — Z91.09 ENVIRONMENTAL ALLERGIES: Primary | ICD-10-CM

## 2025-03-04 PROCEDURE — 99214 OFFICE O/P EST MOD 30 MIN: CPT | Performed by: INTERNAL MEDICINE

## 2025-03-04 PROCEDURE — 3077F SYST BP >= 140 MM HG: CPT | Performed by: INTERNAL MEDICINE

## 2025-03-04 PROCEDURE — 3079F DIAST BP 80-89 MM HG: CPT | Performed by: INTERNAL MEDICINE

## 2025-03-04 RX ORDER — FLUTICASONE PROPIONATE 110 UG/1
1 AEROSOL, METERED RESPIRATORY (INHALATION) 2 TIMES DAILY
Qty: 36 G | Refills: 3 | Status: SHIPPED | OUTPATIENT
Start: 2025-03-04

## 2025-03-04 ASSESSMENT — ASTHMA QUESTIONNAIRES
QUESTION_5 LAST FOUR WEEKS HOW WOULD YOU RATE YOUR ASTHMA CONTROL: COMPLETELY CONTROLLED
QUESTION_4 LAST FOUR WEEKS HOW OFTEN HAVE YOU USED YOUR RESCUE INHALER OR NEBULIZER MEDICATION (SUCH AS ALBUTEROL): TWO OR THREE TIMES PER WEEK
ACT_TOTALSCORE: 18
QUESTION_2 LAST FOUR WEEKS HOW OFTEN HAVE YOU HAD SHORTNESS OF BREATH: THREE TO SIX TIMES A WEEK
QUESTION_3 LAST FOUR WEEKS HOW OFTEN DID YOUR ASTHMA SYMPTOMS (WHEEZING, COUGHING, SHORTNESS OF BREATH, CHEST TIGHTNESS OR PAIN) WAKE YOU UP AT NIGHT OR EARLIER THAN USUAL IN THE MORNING: ONCE OR TWICE
ACT_TOTALSCORE: 18
QUESTION_1 LAST FOUR WEEKS HOW MUCH OF THE TIME DID YOUR ASTHMA KEEP YOU FROM GETTING AS MUCH DONE AT WORK, SCHOOL OR AT HOME: SOME OF THE TIME

## 2025-03-04 NOTE — PROGRESS NOTES
Pulmonary Clinic Visit       Visit Date 03/04/2025  MRN [id number]  PCP Coleen Smith    Chief Complaint   Patient presents with    Follow Up     Asthma        Elizabeth Treviño is a 69 year old female here for follow up      Subjective:   She feels much better. Did not tolerate Breo (mouth irritation/thrush) but did better on Flovent with a spacer.     Minimal cough at this time, mostly throat clearing  No dyspnea  No fevers/chills. No recent illness    Recent symptoms:  +night time congestion - resolved  Wheezing - resolved  with night time awakenings - once a month  No GERD symptoms  + seasonal allergies, spring with high pollen count    Has one dog, no cats, no birds/chicken  Triggers: smoke (cigarettes & wood burning), yard work  Possible h/o mold exposure from her attic from a roof leak, which has been since replaced.    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 Provider to review    Early menopause     ovarian failure at age 36     Endometriosis     Osteopenia     PSVT (paroxysmal supraventricular tachycardia) 9/26/2012        Past Surgical History:   Procedure Laterality Date    BREAST SURGERY Left 1981    benign cyst     COLONOSCOPY  2015    repeat in 10 yrs     ovary removed Left 1986    partial removal of right ovary 1990    right ovary removed          Family History   Problem Relation Age of Onset    Hypertension Mother     Cerebrovascular Disease Mother     Macular  Degeneration Mother     Cancer Father         Lung cancer    Respiratory Father         COPD    No Known Problems Sister     Thyroid Disease Sister     Thyroid Disease Sister     No Known Problems Sister     Cancer Brother         Lung cancer    No Known Problems Brother     No Known Problems Brother     No Known Problems Brother     Colon Cancer Brother     No Known Problems Maternal Grandmother     No Known Problems Maternal Grandfather     No Known Problems Paternal Grandmother     No Known Problems Paternal Grandfather     Amblyopia No family hx of     Retinal detachment No family hx of     Glaucoma No family hx of               Current Outpatient Medications   Medication Sig Dispense Refill    albuterol (PROAIR HFA/PROVENTIL HFA/VENTOLIN HFA) 108 (90 Base) MCG/ACT inhaler Inhale 2 puffs into the lungs every 6 hours as needed for shortness of breath. 18 g 11    B Complex Vitamins (B COMPLEX 1 PO) Take 1 tablet by mouth daily       Calcium Carbonate Antacid (CALCIUM CARBONATE PO) Take by mouth. 1 tablet oral daily      Cholecalciferol (VITAMIN D-3 PO) Take 2,000 Int'l Units by mouth every evening      fluticasone (FLOVENT HFA) 110 MCG/ACT inhaler Inhale 1 puff into the lungs 2 times daily. 36 g 3    levothyroxine (SYNTHROID/LEVOTHROID) 125 MCG tablet Take 1 tablet by mouth once daily 90 tablet 0    rosuvastatin (CRESTOR) 5 MG tablet Take 1 tablet (5 mg) by mouth daily. NEED APPOINTMENT FOR REFILLS 90 tablet 0    VALTREX 500 MG tablet Take 1 tablet (500 mg) by mouth 2 times daily. 30 tablet 1     No current facility-administered medications for this visit.           Objective:  Vitals:    03/04/25 0857   BP: (!) 142/80   Pulse: 80   SpO2: 99%   Weight: 114.3 kg (252 lb)            Physical Exam  Vitals reviewed.   Constitutional:       Appearance: Normal appearance.   Eyes:      General: No scleral icterus.  Cardiovascular:      Rate and Rhythm: Normal rate and regular rhythm.      Heart sounds: No murmur  heard.  Pulmonary:      Effort: Pulmonary effort is normal. No respiratory distress.      Breath sounds: No wheezing, rhonchi or rales.   Musculoskeletal:         General: Deformity: no clubbing.      Right lower leg: No edema.      Left lower leg: No edema.   Neurological:      General: No focal deficit present.      Mental Status: She is alert and oriented to person, place, and time.   Psychiatric:         Mood and Affect: Mood normal.         Behavior: Behavior normal.         Thought Content: Thought content normal.         Judgment: Judgment normal.              PFTs:   Date 12/3/2024   Pre  Post  FVC 3.25 111% 3.35 +3%  FEV1 2.16 94% 2.29 +5%  Ratio 67  RV 2.28 104%   TLC 5.53 101%  DLCOu 107%  DLCOc 111%  Interpretation: Mild obstruction. Normal lung volumes and diffusion. No change post BD           Imaging:   Personally and independently reviewed following imaging with my own impression:   CXR 11/21/2024 (acute resp symptoms): small calcified nodules, no effusions/masses/consolidations.       Cardiac/Echo/Caths:   None recent       Pertinent Labs:   Eos 400 11/2024      Assessment:   Obstructive lung disease, suspect reactive airways/asthma with now fixed obstruction, mild persistent, Active  Environmental allergies, active    Improved control on Flovent ICS with albuterol as needed.  Has been using controller inhaler 2-3 times a week, in anticipation of spring season and allergies,  recommended using as prescribed with albuterol as needed and restarting over-the-counter nonsedating antihistamine.    Plan:  Continue Flovent 1 puff twice a day  Continue albuterol as needed  Counseled on antihistamine use, non sedating meds preferred claritin/allegra/zyrtec. Avoid daily or chronic sudafed or benadryl use        RTC 12 months     Elba Mascorro MD  03/04/2025 9:15 AM  Pulmonary & Critical Care      This note was completed using voice recognition software. Dictation errors may have been missed.

## 2025-03-19 ENCOUNTER — MYC MEDICAL ADVICE (OUTPATIENT)
Dept: FAMILY MEDICINE | Facility: CLINIC | Age: 70
End: 2025-03-19
Payer: MEDICARE

## 2025-03-19 DIAGNOSIS — E03.9 ACQUIRED HYPOTHYROIDISM: Primary | ICD-10-CM

## 2025-03-19 DIAGNOSIS — E78.5 HYPERLIPIDEMIA LDL GOAL <100: ICD-10-CM

## 2025-03-22 ENCOUNTER — HEALTH MAINTENANCE LETTER (OUTPATIENT)
Age: 70
End: 2025-03-22

## 2025-03-24 NOTE — TELEPHONE ENCOUNTER
She saw you 8/29/24 for a physical. Does she need another appointment to see you or just labs? Do you want any other labs besides TSH with free T4 reflex?

## 2025-03-24 NOTE — TELEPHONE ENCOUNTER
I will put a lab in and she can get this done and if stable I can refill her thyroid medication.  She is not due for her physical appointment until September.  Erin Worthy NP on 3/24/2025 at 5:34 PM

## 2025-05-14 ENCOUNTER — LAB (OUTPATIENT)
Dept: LAB | Facility: CLINIC | Age: 70
End: 2025-05-14
Payer: MEDICARE

## 2025-05-14 DIAGNOSIS — E03.9 ACQUIRED HYPOTHYROIDISM: ICD-10-CM

## 2025-05-14 DIAGNOSIS — E78.5 HYPERLIPIDEMIA LDL GOAL <100: ICD-10-CM

## 2025-05-14 LAB
CHOLEST SERPL-MCNC: 177 MG/DL
FASTING STATUS PATIENT QL REPORTED: YES
HDLC SERPL-MCNC: 73 MG/DL
LDLC SERPL CALC-MCNC: 88 MG/DL
NONHDLC SERPL-MCNC: 104 MG/DL
TRIGL SERPL-MCNC: 80 MG/DL
TSH SERPL DL<=0.005 MIU/L-ACNC: 2.98 UIU/ML (ref 0.3–4.2)

## 2025-05-14 PROCEDURE — 80061 LIPID PANEL: CPT

## 2025-05-14 PROCEDURE — 36415 COLL VENOUS BLD VENIPUNCTURE: CPT

## 2025-05-14 PROCEDURE — 84443 ASSAY THYROID STIM HORMONE: CPT

## 2025-05-15 ENCOUNTER — RESULTS FOLLOW-UP (OUTPATIENT)
Dept: FAMILY MEDICINE | Facility: CLINIC | Age: 70
End: 2025-05-15

## 2025-05-15 DIAGNOSIS — E78.2 MIXED HYPERLIPIDEMIA: ICD-10-CM

## 2025-05-15 DIAGNOSIS — E03.9 ACQUIRED HYPOTHYROIDISM: ICD-10-CM

## 2025-05-15 RX ORDER — LEVOTHYROXINE SODIUM 125 UG/1
125 TABLET ORAL DAILY
Qty: 90 TABLET | Refills: 3 | Status: SHIPPED | OUTPATIENT
Start: 2025-05-15

## 2025-05-15 RX ORDER — ROSUVASTATIN CALCIUM 5 MG/1
5 TABLET, COATED ORAL DAILY
Qty: 90 TABLET | Refills: 3 | Status: SHIPPED | OUTPATIENT
Start: 2025-05-15

## 2025-06-02 ENCOUNTER — ANCILLARY PROCEDURE (OUTPATIENT)
Dept: MAMMOGRAPHY | Facility: CLINIC | Age: 70
End: 2025-06-02
Attending: NURSE PRACTITIONER
Payer: MEDICARE

## 2025-06-02 DIAGNOSIS — Z12.31 VISIT FOR SCREENING MAMMOGRAM: ICD-10-CM

## 2025-06-02 PROCEDURE — 77067 SCR MAMMO BI INCL CAD: CPT | Mod: TC | Performed by: RADIOLOGY

## 2025-06-02 PROCEDURE — 77063 BREAST TOMOSYNTHESIS BI: CPT | Mod: TC | Performed by: RADIOLOGY

## 2025-07-08 ENCOUNTER — OFFICE VISIT (OUTPATIENT)
Dept: FAMILY MEDICINE | Facility: CLINIC | Age: 70
End: 2025-07-08
Payer: MEDICARE

## 2025-07-08 VITALS
WEIGHT: 242 LBS | RESPIRATION RATE: 18 BRPM | OXYGEN SATURATION: 98 % | DIASTOLIC BLOOD PRESSURE: 58 MMHG | BODY MASS INDEX: 37.98 KG/M2 | TEMPERATURE: 97.4 F | SYSTOLIC BLOOD PRESSURE: 98 MMHG | HEART RATE: 74 BPM | HEIGHT: 67 IN

## 2025-07-08 DIAGNOSIS — L82.0 INFLAMED SEBORRHEIC KERATOSIS: ICD-10-CM

## 2025-07-08 DIAGNOSIS — L98.9 SKIN LESION: Primary | ICD-10-CM

## 2025-07-08 PROCEDURE — 1126F AMNT PAIN NOTED NONE PRSNT: CPT | Performed by: NURSE PRACTITIONER

## 2025-07-08 PROCEDURE — 3078F DIAST BP <80 MM HG: CPT | Performed by: NURSE PRACTITIONER

## 2025-07-08 PROCEDURE — 3074F SYST BP LT 130 MM HG: CPT | Performed by: NURSE PRACTITIONER

## 2025-07-08 PROCEDURE — 11104 PUNCH BX SKIN SINGLE LESION: CPT | Performed by: NURSE PRACTITIONER

## 2025-07-08 PROCEDURE — 17110 DESTRUCTION B9 LES UP TO 14: CPT | Mod: 59 | Performed by: NURSE PRACTITIONER

## 2025-07-08 PROCEDURE — 88305 TISSUE EXAM BY PATHOLOGIST: CPT | Mod: 59 | Performed by: DERMATOLOGY

## 2025-07-08 PROCEDURE — 11105 PUNCH BX SKIN EA SEP/ADDL: CPT | Performed by: NURSE PRACTITIONER

## 2025-07-08 RX ORDER — LIDOCAINE HYDROCHLORIDE AND EPINEPHRINE 10; 10 MG/ML; UG/ML
4 INJECTION, SOLUTION INFILTRATION; PERINEURAL ONCE
Status: COMPLETED | OUTPATIENT
Start: 2025-07-08 | End: 2025-07-08

## 2025-07-08 RX ORDER — LIDOCAINE HYDROCHLORIDE AND EPINEPHRINE 10; 10 MG/ML; UG/ML
2 INJECTION, SOLUTION INFILTRATION; PERINEURAL ONCE
Status: COMPLETED | OUTPATIENT
Start: 2025-07-08 | End: 2025-07-08

## 2025-07-08 RX ADMIN — LIDOCAINE HYDROCHLORIDE AND EPINEPHRINE 2 ML: 10; 10 INJECTION, SOLUTION INFILTRATION; PERINEURAL at 09:15

## 2025-07-08 RX ADMIN — LIDOCAINE HYDROCHLORIDE AND EPINEPHRINE 4 ML: 10; 10 INJECTION, SOLUTION INFILTRATION; PERINEURAL at 09:10

## 2025-07-08 ASSESSMENT — PAIN SCALES - GENERAL: PAINLEVEL_OUTOF10: NO PAIN (0)

## 2025-07-08 NOTE — PATIENT INSTRUCTIONS
Wound Care Instructions    1.  Keep area dry today.    2.  Starting tomorrow wash gently with soap and water once daily.      3.  Apply Vaseline or antibiotic ointment to the area and cover with a bandage if desired.  Do not let it dry out and form a scab as this will make the resulting scar more noticeable.    4. Protect the area from sun for up to one year afterward as the scar is continuing to remodel.  Sun exposure will also make the resulting scar more noticeable.    5.  Call if the area is very red, tender, has a discharge or is very itchy while healing, or if you have any other questions.  These may be signs of early infection or allergy.    6.  Follow-up if any persistent issues with back lesions and we may need to put in referral to dermatology for consult.

## 2025-07-08 NOTE — PROGRESS NOTES
Assessment & Plan     Skin lesion  Punch biopsies performed on the lesions that were enlarging and causing itching for rule out cause of benign inflammation verses skin cancer.  Patient will be notified of results.  - DC PUNCH BIOPSY OF SKIN, FIRST/SINGLE LESION  - DC PUNCH BIOPSY OF SKIN, EA SEPARATE/ADDL LESION  - lidocaine 1% with EPINEPHrine 1:100,000 injection 4 mL  - lidocaine 1% with EPINEPHrine 1:100,000 injection 2 mL  - Dermatological Path Order and Indications; Standing  - Dermatological Path Order and Indications    Inflamed seborrheic keratosis  Cryo performed on back lesions.  Follow-up if not improving and we will refer to dermatology.  - DESTRUCT BENIGN LESION, UP TO 14  - Dermatological Path Order and Indications; Standing  - Dermatological Path Order and Indications    See AVS for patient instructions.    Hiro Johnson is a 70 year old, presenting for the following health issues:  Derm Problem        7/8/2025     8:36 AM   Additional Questions   Roomed by rmb   Accompanied by self         7/8/2025     8:36 AM   Patient Reported Additional Medications   Patient reports taking the following new medications none     History of Present Illness       Reason for visit:  Moles itchy need checked  Symptom onset:  More than a month  Symptom intensity:  Mild  Symptom progression:  Staying the same  Had these symptoms before:  Yes  Has tried/received treatment for these symptoms:  No  What makes it worse:  No  What makes it better:  Rub it   She is taking medications regularly.      Patient has several lesions on her back that are itchy and one lesion on the left upper arm and under the right breast that are getting larger over time and bothersome and itchy as well.  She would like her skin looked at and treated if needed.      Review of Systems  CONSTITUTIONAL: NEGATIVE for fever, chills, change in weight  INTEGUMENTARY/SKIN: POSITIVE for lesions on back, left upper arm, and under right breast   RESP:  "NEGATIVE for significant cough or SOB  CV: NEGATIVE for chest pain, palpitations or peripheral edema  PSYCHIATRIC: NEGATIVE for changes in mood or affect  ROS otherwise negative      Objective    BP 98/58   Pulse 74   Temp 97.4  F (36.3  C) (Tympanic)   Resp 18   Ht 1.702 m (5' 7\")   Wt 109.8 kg (242 lb)   SpO2 98%   BMI 37.90 kg/m    Body mass index is 37.9 kg/m .  Physical Exam   GENERAL: alert and no distress  SKIN: multiple inflamed raised SK's on her back; a raised flesh colored lesion on the upper lateral right arm above the elbow 3-4 inches that is 4 mm in diameter, multiple hemangiomas on the chest and back, one raised and irritated hemangioma under the right breast that is approximately 3-4 mm in diameter.  PSYCH: mentation appears normal, affect normal/bright    PROCEDURES:    Used cryo x 2 to 9 SK's on the back.  Patient tolerated procedure.    Left upper arm and under right breast lesions cleaned with betadine then wiped away with alcoholol.  One pecent lidocaine with epineprhine used to infiltrate the area with good anethesia.  A 4 mm punch biopsy was performed on both lesions and patient tolerated well.  Minimal bleeding noted.  Applied bacitracin ointment and covered with large band-aid.     Signed Electronically by: Erin Worthy NP    "

## 2025-07-09 LAB
PATH REPORT.COMMENTS IMP SPEC: NORMAL
PATH REPORT.COMMENTS IMP SPEC: NORMAL
PATH REPORT.FINAL DX SPEC: NORMAL
PATH REPORT.GROSS SPEC: NORMAL
PATH REPORT.MICROSCOPIC SPEC OTHER STN: NORMAL
PATH REPORT.RELEVANT HX SPEC: NORMAL

## 2025-07-30 ENCOUNTER — PATIENT OUTREACH (OUTPATIENT)
Dept: CARE COORDINATION | Facility: CLINIC | Age: 70
End: 2025-07-30
Payer: MEDICARE

## 2025-08-13 ENCOUNTER — PATIENT OUTREACH (OUTPATIENT)
Dept: CARE COORDINATION | Facility: CLINIC | Age: 70
End: 2025-08-13
Payer: MEDICARE